# Patient Record
Sex: MALE | Race: BLACK OR AFRICAN AMERICAN | NOT HISPANIC OR LATINO | Employment: OTHER | URBAN - METROPOLITAN AREA
[De-identification: names, ages, dates, MRNs, and addresses within clinical notes are randomized per-mention and may not be internally consistent; named-entity substitution may affect disease eponyms.]

---

## 2020-01-15 ENCOUNTER — OFFICE VISIT (OUTPATIENT)
Dept: PRIMARY CARE CLINIC | Facility: CLINIC | Age: 75
End: 2020-01-15
Payer: MEDICARE

## 2020-01-15 VITALS
DIASTOLIC BLOOD PRESSURE: 70 MMHG | RESPIRATION RATE: 16 BRPM | WEIGHT: 249 LBS | OXYGEN SATURATION: 98 % | HEART RATE: 76 BPM | BODY MASS INDEX: 36.88 KG/M2 | HEIGHT: 69 IN | SYSTOLIC BLOOD PRESSURE: 130 MMHG | TEMPERATURE: 98 F

## 2020-01-15 DIAGNOSIS — Z86.79 HISTORY OF ATRIAL FIBRILLATION: ICD-10-CM

## 2020-01-15 DIAGNOSIS — Z11.59 NEED FOR HEPATITIS C SCREENING TEST: Primary | ICD-10-CM

## 2020-01-15 DIAGNOSIS — E66.9 OBESITY, UNSPECIFIED CLASSIFICATION, UNSPECIFIED OBESITY TYPE, UNSPECIFIED WHETHER SERIOUS COMORBIDITY PRESENT: ICD-10-CM

## 2020-01-15 DIAGNOSIS — I10 HYPERTENSION, UNSPECIFIED TYPE: ICD-10-CM

## 2020-01-15 DIAGNOSIS — G47.33 OBSTRUCTIVE SLEEP APNEA: ICD-10-CM

## 2020-01-15 DIAGNOSIS — E78.5 HYPERLIPIDEMIA, UNSPECIFIED HYPERLIPIDEMIA TYPE: ICD-10-CM

## 2020-01-15 DIAGNOSIS — Z95.810 STATUS POST IMPLANTATION OF AUTOMATIC CARDIOVERTER/DEFIBRILLATOR (AICD): ICD-10-CM

## 2020-01-15 DIAGNOSIS — N28.9 RENAL DISEASE: ICD-10-CM

## 2020-01-15 DIAGNOSIS — I25.10 CORONARY ARTERY DISEASE, ANGINA PRESENCE UNSPECIFIED, UNSPECIFIED VESSEL OR LESION TYPE, UNSPECIFIED WHETHER NATIVE OR TRANSPLANTED HEART: ICD-10-CM

## 2020-01-15 PROCEDURE — 99203 PR OFFICE/OUTPT VISIT, NEW, LEVL III, 30-44 MIN: ICD-10-PCS | Mod: S$GLB,,, | Performed by: FAMILY MEDICINE

## 2020-01-15 PROCEDURE — 1159F MED LIST DOCD IN RCRD: CPT | Mod: S$GLB,,, | Performed by: FAMILY MEDICINE

## 2020-01-15 PROCEDURE — 99203 OFFICE O/P NEW LOW 30 MIN: CPT | Mod: PBBFAC,PN | Performed by: FAMILY MEDICINE

## 2020-01-15 PROCEDURE — 99203 OFFICE O/P NEW LOW 30 MIN: CPT | Mod: S$GLB,,, | Performed by: FAMILY MEDICINE

## 2020-01-15 PROCEDURE — 99999 PR PBB SHADOW E&M-NEW PATIENT-LVL III: ICD-10-PCS | Mod: PBBFAC,,, | Performed by: FAMILY MEDICINE

## 2020-01-15 PROCEDURE — 1126F PR PAIN SEVERITY QUANTIFIED, NO PAIN PRESENT: ICD-10-PCS | Mod: S$GLB,,, | Performed by: FAMILY MEDICINE

## 2020-01-15 PROCEDURE — 99999 PR PBB SHADOW E&M-NEW PATIENT-LVL III: CPT | Mod: PBBFAC,,, | Performed by: FAMILY MEDICINE

## 2020-01-15 PROCEDURE — 1159F PR MEDICATION LIST DOCUMENTED IN MEDICAL RECORD: ICD-10-PCS | Mod: S$GLB,,, | Performed by: FAMILY MEDICINE

## 2020-01-15 PROCEDURE — 1126F AMNT PAIN NOTED NONE PRSNT: CPT | Mod: S$GLB,,, | Performed by: FAMILY MEDICINE

## 2020-01-15 RX ORDER — GABAPENTIN 300 MG/1
300 CAPSULE ORAL 2 TIMES DAILY
Status: ON HOLD | COMMUNITY
End: 2021-06-10 | Stop reason: HOSPADM

## 2020-01-15 RX ORDER — METOPROLOL SUCCINATE 200 MG/1
200 TABLET, EXTENDED RELEASE ORAL DAILY
Status: ON HOLD | COMMUNITY
End: 2021-04-01 | Stop reason: HOSPADM

## 2020-01-15 RX ORDER — TRAZODONE HYDROCHLORIDE 100 MG/1
100 TABLET ORAL NIGHTLY PRN
Status: ON HOLD | COMMUNITY
End: 2021-04-01 | Stop reason: HOSPADM

## 2020-01-15 RX ORDER — FLUOXETINE HYDROCHLORIDE 20 MG/1
20 CAPSULE ORAL DAILY
Status: ON HOLD | COMMUNITY
End: 2021-04-01 | Stop reason: HOSPADM

## 2020-01-15 RX ORDER — ALLOPURINOL 100 MG/1
200 TABLET ORAL DAILY
Status: ON HOLD | COMMUNITY
End: 2021-06-08 | Stop reason: SDUPTHER

## 2020-01-15 RX ORDER — FUROSEMIDE 40 MG/1
40 TABLET ORAL 2 TIMES DAILY
Status: ON HOLD | COMMUNITY
End: 2021-04-01 | Stop reason: HOSPADM

## 2020-01-15 RX ORDER — RANOLAZINE 500 MG/1
500 TABLET, EXTENDED RELEASE ORAL DAILY
Status: ON HOLD | COMMUNITY
End: 2021-04-01 | Stop reason: HOSPADM

## 2020-01-15 RX ORDER — ISOSORBIDE MONONITRATE 30 MG/1
90 TABLET, EXTENDED RELEASE ORAL 2 TIMES DAILY
Status: ON HOLD | COMMUNITY
End: 2021-04-01 | Stop reason: HOSPADM

## 2020-01-15 NOTE — PROGRESS NOTES
Subjective:       Patient ID: Felipe Orozco is a 74 y.o. male.    Chief Complaint: Establish Care    HPI: 75 yo BM --pt joined [employer] --so able be in exercise classes. Pt [facility].  Patient in for evaluation as a PCP for his insurance but [facility] because [facility] gets his medications for free.        Eating OK should be on 1800 calorie ADA low-sodium low-fat diet       + BM --occas has to strian       Ambulating gets SOB.    ROS:  Skin: no psoriasis, eczema, skin cancer   HEENT: +headache,no ocular pain, blurred vision, diplopia, history bilateral cataract surgery,no  epistaxis, hoarseness +change in voice, thyroid trouble--Hx T&A --due to tonsils being enlarged and certain times years they would get infected--nose stops up a lot --uses nasal spray  Lung: No pneumonia, asthma, Tb, wheezing, +SOB--no smoking history of albuterol inhaler in the past but no new +obstructive sleep apnea patient is CPAP machine at home  Heart: No chest pain, +ankle edema,  +palpitations--has defibrillator, no  MI, neal murmur,+hypertension, +hyperlipidemia--no stent, no bypass-[facility] of [facility]  Abdomen: No nausea, vomiting, diarrhea, constipation, ulcers, hepatitis, gallbladder disease, melena, hematochezia, hematemesis  : no UTI, renal disease, stones prostate history kidney problems wants in the past told was not functioning right  MS: no fractures, O/A, lupus, rheumatoid, +gout   Neuro: + occas  dizziness, no LOC, seizures   + diabetes was 99 this AM , no anemia, no anxiety, no depression   Single, 5 children, retired, lives with fiance    Objective:   Physical Exam:  General: Well nourished, well developed, no acute distress  Skin: No lesions  HEENT: Eyes PERRLA, EOM intact, nose patent, throat non-erythematous   NECK: Supple, no bruits, No JVD, no nodes  Lungs: Clear, no rales, rhonchi, wheezing  Heart: Regular rate  and rhythm, no murmurs, gallops, or rubs  Abdomen: flat, bowel sounds positive, no tenderness, or organomegaly  MS: Range of motion and muscle strength intact  Neuro: Alert, CN intact, oriented X 3  Extremities: No cyanosis, clubbing, or edema         Assessment:       1. Need for hepatitis C screening test    2. Hypertension, unspecified type    3. Hyperlipidemia, unspecified hyperlipidemia type    4. History of atrial fibrillation    5. Status post implantation of automatic cardioverter/defibrillator (AICD)    6. Coronary artery disease, angina presence unspecified, unspecified vessel or lesion type, unspecified whether native or transplanted heart    7. IDDM (insulin dependent diabetes mellitus)    8. Obesity, unspecified classification, unspecified obesity type, unspecified whether serious comorbidity present    9. Renal disease    10. Obstructive sleep apnea        Plan:       Need for hepatitis C screening test  -     Hepatitis C antibody; Future; Expected date: 01/15/2020    Hypertension, unspecified type  -     CBC auto differential; Future; Expected date: 01/15/2020  -     Comprehensive metabolic panel; Future; Expected date: 01/15/2020  -     EKG 12-lead; Future  -     Fecal Immunochemical Test (iFOBT); Future; Expected date: 01/15/2020  -     X-Ray Chest PA And Lateral; Future; Expected date: 01/15/2020  -     POCT urine dipstick without microscope  -     T4, free; Future; Expected date: 01/15/2020  -     TSH; Future; Expected date: 01/15/2020    Hyperlipidemia, unspecified hyperlipidemia type  -     Lipid panel; Future; Expected date: 01/15/2020    History of atrial fibrillation    Status post implantation of automatic cardioverter/defibrillator (AICD)    Coronary artery disease, angina presence unspecified, unspecified vessel or lesion type, unspecified whether native or transplanted heart    IDDM (insulin dependent diabetes mellitus)    Obesity, unspecified classification, unspecified obesity type,  unspecified whether serious comorbidity present    Renal disease    Obstructive sleep apnea      patient in for PCP for [employer] patient wanted to joint and exercise gym--will give note that it is okay for patient to exercise  Needs routine physical CBCs CMP lipids T4 TSH stool guaiac UA chest x-ray EKG is physical can do [facility] or [facility]  Health maintenance hepatitis C lipids tetanus pneumococcal vaccine flu shot screen for abdominal aortic aneurysm  Patient not sure of medications studies on--will get patient to go home get listed bring it in to see if refills need to be done--may benefit from an albuterol inhaler  MiraLax OTC 1 cap in 6 oz water q.d for  Constipation--can improve constipation with--increase fluids as long as a ankle edema is controlled, increased fruit especially pit loose prunes, increase fiber,, avoid rice and bananas

## 2020-12-09 ENCOUNTER — TELEPHONE (OUTPATIENT)
Dept: ADMINISTRATIVE | Facility: HOSPITAL | Age: 75
End: 2020-12-09

## 2020-12-09 NOTE — TELEPHONE ENCOUNTER
I called the patient to get him scheduled for his labs and overdue follow up. The patient states that he will call back to schedule as he is in [place] to have open heart surgery.

## 2021-02-02 ENCOUNTER — OFFICE VISIT (OUTPATIENT)
Dept: CARDIOLOGY | Facility: CLINIC | Age: 76
End: 2021-02-02
Payer: OTHER GOVERNMENT

## 2021-02-02 ENCOUNTER — DOCUMENTATION ONLY (OUTPATIENT)
Dept: CARDIOLOGY | Facility: CLINIC | Age: 76
End: 2021-02-02

## 2021-02-02 VITALS
SYSTOLIC BLOOD PRESSURE: 124 MMHG | OXYGEN SATURATION: 98 % | WEIGHT: 216.06 LBS | BODY MASS INDEX: 30.93 KG/M2 | DIASTOLIC BLOOD PRESSURE: 80 MMHG | HEIGHT: 70 IN | HEART RATE: 85 BPM

## 2021-02-02 DIAGNOSIS — E11.22 CONTROLLED TYPE 2 DIABETES MELLITUS WITH STAGE 3 CHRONIC KIDNEY DISEASE, WITHOUT LONG-TERM CURRENT USE OF INSULIN: ICD-10-CM

## 2021-02-02 DIAGNOSIS — N18.30 CONTROLLED TYPE 2 DIABETES MELLITUS WITH STAGE 3 CHRONIC KIDNEY DISEASE, WITHOUT LONG-TERM CURRENT USE OF INSULIN: ICD-10-CM

## 2021-02-02 DIAGNOSIS — I10 HYPERTENSION, UNSPECIFIED TYPE: ICD-10-CM

## 2021-02-02 DIAGNOSIS — E78.5 HYPERLIPIDEMIA, UNSPECIFIED HYPERLIPIDEMIA TYPE: ICD-10-CM

## 2021-02-02 DIAGNOSIS — Z86.79 HISTORY OF ATRIAL FIBRILLATION: ICD-10-CM

## 2021-02-02 DIAGNOSIS — I25.119 CORONARY ARTERY DISEASE INVOLVING NATIVE CORONARY ARTERY OF NATIVE HEART WITH ANGINA PECTORIS: Primary | ICD-10-CM

## 2021-02-02 DIAGNOSIS — Z01.812 PRE-PROCEDURE LAB EXAM: ICD-10-CM

## 2021-02-02 DIAGNOSIS — N18.30 STAGE 3 CHRONIC KIDNEY DISEASE, UNSPECIFIED WHETHER STAGE 3A OR 3B CKD: ICD-10-CM

## 2021-02-02 DIAGNOSIS — I25.5 ISCHEMIC CARDIOMYOPATHY: ICD-10-CM

## 2021-02-02 PROCEDURE — 99999 PR PBB SHADOW E&M-EST. PATIENT-LVL III: CPT | Mod: PBBFAC,,, | Performed by: INTERNAL MEDICINE

## 2021-02-02 PROCEDURE — 99205 PR OFFICE/OUTPT VISIT, NEW, LEVL V, 60-74 MIN: ICD-10-PCS | Mod: S$PBB,,, | Performed by: INTERNAL MEDICINE

## 2021-02-02 PROCEDURE — 99213 OFFICE O/P EST LOW 20 MIN: CPT | Mod: PBBFAC | Performed by: INTERNAL MEDICINE

## 2021-02-02 PROCEDURE — 99999 PR PBB SHADOW E&M-EST. PATIENT-LVL III: ICD-10-PCS | Mod: PBBFAC,,, | Performed by: INTERNAL MEDICINE

## 2021-02-02 PROCEDURE — 99205 OFFICE O/P NEW HI 60 MIN: CPT | Mod: S$PBB,,, | Performed by: INTERNAL MEDICINE

## 2021-02-02 RX ORDER — DIPHENHYDRAMINE HCL 50 MG
50 CAPSULE ORAL ONCE
Status: CANCELLED | OUTPATIENT
Start: 2021-02-02 | End: 2021-02-02

## 2021-02-02 RX ORDER — SODIUM CHLORIDE 9 MG/ML
INJECTION, SOLUTION INTRAVENOUS CONTINUOUS
Status: CANCELLED | OUTPATIENT
Start: 2021-02-02 | End: 2021-02-02

## 2021-02-12 ENCOUNTER — HOSPITAL ENCOUNTER (OUTPATIENT)
Dept: CARDIOLOGY | Facility: HOSPITAL | Age: 76
Discharge: HOME | End: 2021-02-12
Attending: INTERNAL MEDICINE
Payer: OTHER GOVERNMENT

## 2021-02-12 VITALS
HEIGHT: 70 IN | WEIGHT: 216 LBS | HEART RATE: 70 BPM | BODY MASS INDEX: 30.92 KG/M2 | SYSTOLIC BLOOD PRESSURE: 130 MMHG | DIASTOLIC BLOOD PRESSURE: 80 MMHG

## 2021-02-12 DIAGNOSIS — I25.119 CORONARY ARTERY DISEASE INVOLVING NATIVE CORONARY ARTERY OF NATIVE HEART WITH ANGINA PECTORIS: ICD-10-CM

## 2021-02-12 LAB
ASCENDING AORTA: 3.75 CM
AV INDEX (PROSTH): 0.4
AV MEAN GRADIENT: 23 MMHG
AV PEAK GRADIENT: 43 MMHG
AV VALVE AREA: 1.23 CM2
AV VELOCITY RATIO: 0.39
BSA FOR ECHO PROCEDURE: 2.2 M2
CV ECHO LV RWT: 0.37 CM
DOP CALC AO PEAK VEL: 3.27 M/S
DOP CALC AO VTI: 78.45 CM
DOP CALC LVOT AREA: 3.1 CM2
DOP CALC LVOT DIAMETER: 1.99 CM
DOP CALC LVOT PEAK VEL: 1.26 M/S
DOP CALC LVOT STROKE VOLUME: 96.8 CM3
DOP CALCLVOT PEAK VEL VTI: 31.14 CM
E/E' RATIO: 11.16 M/S
ECHO LV POSTERIOR WALL: 1.01 CM (ref 0.6–1.1)
FRACTIONAL SHORTENING: 29 % (ref 28–44)
INTERVENTRICULAR SEPTUM: 0.96 CM (ref 0.6–1.1)
IVRT: 15.22 MSEC
LA MAJOR: 6.69 CM
LA MINOR: 6.61 CM
LA WIDTH: 4.43 CM
LEFT ATRIUM SIZE: 5.11 CM
LEFT ATRIUM VOLUME INDEX MOD: 46.9 ML/M2
LEFT ATRIUM VOLUME INDEX: 59.2 ML/M2
LEFT ATRIUM VOLUME MOD: 101.32 CM3
LEFT ATRIUM VOLUME: 127.95 CM3
LEFT INTERNAL DIMENSION IN SYSTOLE: 3.87 CM (ref 2.1–4)
LEFT VENTRICLE DIASTOLIC VOLUME INDEX: 65.92 ML/M2
LEFT VENTRICLE DIASTOLIC VOLUME: 142.39 ML
LEFT VENTRICLE MASS INDEX: 94 G/M2
LEFT VENTRICLE SYSTOLIC VOLUME INDEX: 29.9 ML/M2
LEFT VENTRICLE SYSTOLIC VOLUME: 64.53 ML
LEFT VENTRICULAR INTERNAL DIMENSION IN DIASTOLE: 5.42 CM (ref 3.5–6)
LEFT VENTRICULAR MASS: 203.91 G
LV LATERAL E/E' RATIO: 9.64 M/S
LV SEPTAL E/E' RATIO: 13.25 M/S
MV A" WAVE DURATION": 18.65 MSEC
MV PEAK E VEL: 1.06 M/S
PISA TR MAX VEL: 2.98 M/S
PULM VEIN S/D RATIO: 0.8
PV PEAK D VEL: 0.71 M/S
PV PEAK S VEL: 0.57 M/S
RA MAJOR: 5.5 CM
RA PRESSURE: 3 MMHG
RA WIDTH: 5.2 CM
RETIRED EF AND QEF - SEE NOTES: 45 %
RIGHT VENTRICULAR END-DIASTOLIC DIMENSION: 4.4 CM
RV TISSUE DOPPLER FREE WALL SYSTOLIC VELOCITY 1 (APICAL 4 CHAMBER VIEW): 9.96 CM/S
SINUS: 3.73 CM
STJ: 3.3 CM
TDI LATERAL: 0.11 M/S
TDI SEPTAL: 0.08 M/S
TDI: 0.1 M/S
TR MAX PG: 36 MMHG
TRICUSPID ANNULAR PLANE SYSTOLIC EXCURSION: 1.84 CM
TV REST PULMONARY ARTERY PRESSURE: 39 MMHG

## 2021-02-12 PROCEDURE — 93306 TTE W/DOPPLER COMPLETE: CPT | Mod: 26,,, | Performed by: INTERNAL MEDICINE

## 2021-02-12 PROCEDURE — 93306 ECHO (CUPID ONLY): ICD-10-PCS | Mod: 26,,, | Performed by: INTERNAL MEDICINE

## 2021-02-12 PROCEDURE — 93306 TTE W/DOPPLER COMPLETE: CPT

## 2021-02-19 ENCOUNTER — LAB VISIT (OUTPATIENT)
Dept: INTERNAL MEDICINE | Facility: CLINIC | Age: 76
End: 2021-02-19
Payer: OTHER GOVERNMENT

## 2021-02-19 DIAGNOSIS — Z01.812 PRE-PROCEDURE LAB EXAM: ICD-10-CM

## 2021-02-19 PROCEDURE — U0005 INFEC AGEN DETEC AMPLI PROBE: HCPCS

## 2021-02-19 PROCEDURE — U0003 INFECTIOUS AGENT DETECTION BY NUCLEIC ACID (DNA OR RNA); SEVERE ACUTE RESPIRATORY SYNDROME CORONAVIRUS 2 (SARS-COV-2) (CORONAVIRUS DISEASE [COVID-19]), AMPLIFIED PROBE TECHNIQUE, MAKING USE OF HIGH THROUGHPUT TECHNOLOGIES AS DESCRIBED BY CMS-2020-01-R: HCPCS

## 2021-02-20 LAB — SARS-COV-2 RNA RESP QL NAA+PROBE: NOT DETECTED

## 2021-02-22 ENCOUNTER — HOSPITAL ENCOUNTER (OUTPATIENT)
Facility: HOSPITAL | Age: 76
Discharge: HOME | End: 2021-02-22
Attending: INTERNAL MEDICINE | Admitting: INTERNAL MEDICINE
Payer: OTHER GOVERNMENT

## 2021-02-22 VITALS
BODY MASS INDEX: 32.21 KG/M2 | OXYGEN SATURATION: 95 % | TEMPERATURE: 97 F | DIASTOLIC BLOOD PRESSURE: 64 MMHG | WEIGHT: 225 LBS | HEART RATE: 87 BPM | HEIGHT: 70 IN | SYSTOLIC BLOOD PRESSURE: 116 MMHG | RESPIRATION RATE: 16 BRPM

## 2021-02-22 DIAGNOSIS — I25.119 CORONARY ARTERY DISEASE INVOLVING NATIVE CORONARY ARTERY OF NATIVE HEART WITH ANGINA PECTORIS: ICD-10-CM

## 2021-02-22 LAB
ABO + RH BLD: NORMAL
ANION GAP SERPL CALC-SCNC: 10 MMOL/L (ref 8–16)
BASOPHILS # BLD AUTO: 0.07 K/UL (ref 0–0.2)
BASOPHILS NFR BLD: 1.3 % (ref 0–1.9)
BLD GP AB SCN CELLS X3 SERPL QL: NORMAL
BUN SERPL-MCNC: 35 MG/DL (ref 8–23)
CALCIUM SERPL-MCNC: 8.5 MG/DL (ref 8.7–10.5)
CHLORIDE SERPL-SCNC: 107 MMOL/L (ref 95–110)
CO2 SERPL-SCNC: 24 MMOL/L (ref 23–29)
CREAT SERPL-MCNC: 1.8 MG/DL (ref 0.5–1.4)
DIFFERENTIAL METHOD: ABNORMAL
EOSINOPHIL # BLD AUTO: 0.3 K/UL (ref 0–0.5)
EOSINOPHIL NFR BLD: 4.8 % (ref 0–8)
ERYTHROCYTE [DISTWIDTH] IN BLOOD BY AUTOMATED COUNT: 16.6 % (ref 11.5–14.5)
EST. GFR  (AFRICAN AMERICAN): 42.2 ML/MIN/1.73 M^2
EST. GFR  (NON AFRICAN AMERICAN): 36.5 ML/MIN/1.73 M^2
GLUCOSE SERPL-MCNC: 107 MG/DL (ref 70–110)
HCT VFR BLD AUTO: 37.5 % (ref 40–54)
HGB BLD-MCNC: 11.8 G/DL (ref 14–18)
IMM GRANULOCYTES # BLD AUTO: 0.01 K/UL (ref 0–0.04)
IMM GRANULOCYTES NFR BLD AUTO: 0.2 % (ref 0–0.5)
LYMPHOCYTES # BLD AUTO: 2 K/UL (ref 1–4.8)
LYMPHOCYTES NFR BLD: 36.8 % (ref 18–48)
MCH RBC QN AUTO: 26.5 PG (ref 27–31)
MCHC RBC AUTO-ENTMCNC: 31.5 G/DL (ref 32–36)
MCV RBC AUTO: 84 FL (ref 82–98)
MONOCYTES # BLD AUTO: 0.4 K/UL (ref 0.3–1)
MONOCYTES NFR BLD: 7.9 % (ref 4–15)
NEUTROPHILS # BLD AUTO: 2.7 K/UL (ref 1.8–7.7)
NEUTROPHILS NFR BLD: 49 % (ref 38–73)
NRBC BLD-RTO: 0 /100 WBC
PLATELET # BLD AUTO: 178 K/UL (ref 150–350)
PMV BLD AUTO: 11 FL (ref 9.2–12.9)
POCT GLUCOSE: 116 MG/DL (ref 70–110)
POTASSIUM SERPL-SCNC: 4.7 MMOL/L (ref 3.5–5.1)
RBC # BLD AUTO: 4.46 M/UL (ref 4.6–6.2)
SODIUM SERPL-SCNC: 141 MMOL/L (ref 136–145)
WBC # BLD AUTO: 5.41 K/UL (ref 3.9–12.7)

## 2021-02-22 PROCEDURE — 82962 GLUCOSE BLOOD TEST: CPT | Performed by: INTERNAL MEDICINE

## 2021-02-22 PROCEDURE — 99152 MOD SED SAME PHYS/QHP 5/>YRS: CPT | Performed by: INTERNAL MEDICINE

## 2021-02-22 PROCEDURE — 86900 BLOOD TYPING SEROLOGIC ABO: CPT

## 2021-02-22 PROCEDURE — 25000003 PHARM REV CODE 250: Performed by: INTERNAL MEDICINE

## 2021-02-22 PROCEDURE — 63600175 PHARM REV CODE 636 W HCPCS: Performed by: INTERNAL MEDICINE

## 2021-02-22 PROCEDURE — 93458 PR CATH PLACE/CORON ANGIO, IMG SUPER/INTERP,W LEFT HEART VENTRICULOGRAPHY: ICD-10-PCS | Mod: 26,,, | Performed by: INTERNAL MEDICINE

## 2021-02-22 PROCEDURE — 93005 ELECTROCARDIOGRAM TRACING: CPT

## 2021-02-22 PROCEDURE — C1894 INTRO/SHEATH, NON-LASER: HCPCS | Performed by: INTERNAL MEDICINE

## 2021-02-22 PROCEDURE — 99152 MOD SED SAME PHYS/QHP 5/>YRS: CPT | Mod: ,,, | Performed by: INTERNAL MEDICINE

## 2021-02-22 PROCEDURE — 99153 MOD SED SAME PHYS/QHP EA: CPT | Performed by: INTERNAL MEDICINE

## 2021-02-22 PROCEDURE — 99152 PR MOD CONSCIOUS SEDATION, SAME PHYS, 5+ YRS, FIRST 15 MIN: ICD-10-PCS | Mod: ,,, | Performed by: INTERNAL MEDICINE

## 2021-02-22 PROCEDURE — 85025 COMPLETE CBC W/AUTO DIFF WBC: CPT

## 2021-02-22 PROCEDURE — 93010 EKG 12-LEAD: ICD-10-PCS | Mod: ,,, | Performed by: INTERNAL MEDICINE

## 2021-02-22 PROCEDURE — 93010 ELECTROCARDIOGRAM REPORT: CPT | Mod: ,,, | Performed by: INTERNAL MEDICINE

## 2021-02-22 PROCEDURE — 80048 BASIC METABOLIC PNL TOTAL CA: CPT

## 2021-02-22 PROCEDURE — 36415 COLL VENOUS BLD VENIPUNCTURE: CPT

## 2021-02-22 PROCEDURE — 93458 L HRT ARTERY/VENTRICLE ANGIO: CPT | Performed by: INTERNAL MEDICINE

## 2021-02-22 PROCEDURE — 25500020 PHARM REV CODE 255: Performed by: INTERNAL MEDICINE

## 2021-02-22 PROCEDURE — 93458 L HRT ARTERY/VENTRICLE ANGIO: CPT | Mod: 26,,, | Performed by: INTERNAL MEDICINE

## 2021-02-22 RX ORDER — DIPHENHYDRAMINE HCL 50 MG
50 CAPSULE ORAL ONCE
Status: COMPLETED | OUTPATIENT
Start: 2021-02-22 | End: 2021-02-22

## 2021-02-22 RX ORDER — NITROGLYCERIN 5 MG/ML
INJECTION, SOLUTION INTRAVENOUS
Status: DISCONTINUED | OUTPATIENT
Start: 2021-02-22 | End: 2021-02-22 | Stop reason: HOSPADM

## 2021-02-22 RX ORDER — SODIUM CHLORIDE 9 MG/ML
INJECTION, SOLUTION INTRAVENOUS CONTINUOUS
Status: ACTIVE | OUTPATIENT
Start: 2021-02-22 | End: 2021-02-22

## 2021-02-22 RX ORDER — IODIXANOL 320 MG/ML
INJECTION, SOLUTION INTRAVASCULAR
Status: DISCONTINUED | OUTPATIENT
Start: 2021-02-22 | End: 2021-02-22 | Stop reason: HOSPADM

## 2021-02-22 RX ORDER — FENTANYL CITRATE 50 UG/ML
INJECTION, SOLUTION INTRAMUSCULAR; INTRAVENOUS
Status: DISCONTINUED | OUTPATIENT
Start: 2021-02-22 | End: 2021-02-22 | Stop reason: HOSPADM

## 2021-02-22 RX ORDER — CLOPIDOGREL BISULFATE 75 MG/1
75 TABLET ORAL DAILY
Qty: 30 TABLET | Refills: 11 | Status: SHIPPED | OUTPATIENT
Start: 2021-02-22 | End: 2021-02-23

## 2021-02-22 RX ORDER — ACETAMINOPHEN 325 MG/1
650 TABLET ORAL EVERY 4 HOURS PRN
Status: DISCONTINUED | OUTPATIENT
Start: 2021-02-22 | End: 2021-02-22 | Stop reason: HOSPADM

## 2021-02-22 RX ORDER — SODIUM CHLORIDE 9 MG/ML
INJECTION, SOLUTION INTRAVENOUS
Status: DISCONTINUED | OUTPATIENT
Start: 2021-02-22 | End: 2021-02-22 | Stop reason: HOSPADM

## 2021-02-22 RX ORDER — LIDOCAINE HYDROCHLORIDE 20 MG/ML
INJECTION, SOLUTION INFILTRATION; PERINEURAL
Status: DISCONTINUED | OUTPATIENT
Start: 2021-02-22 | End: 2021-02-22 | Stop reason: HOSPADM

## 2021-02-22 RX ORDER — CLOPIDOGREL 300 MG/1
600 TABLET, FILM COATED ORAL ONCE
Status: COMPLETED | OUTPATIENT
Start: 2021-02-22 | End: 2021-02-22

## 2021-02-22 RX ORDER — CLOPIDOGREL BISULFATE 75 MG/1
75 TABLET ORAL DAILY
Qty: 30 TABLET | Refills: 11 | Status: SHIPPED | OUTPATIENT
Start: 2021-02-22 | End: 2021-02-22 | Stop reason: SDUPTHER

## 2021-02-22 RX ORDER — HEPARIN SOD,PORCINE/0.9 % NACL 1000/500ML
INTRAVENOUS SOLUTION INTRAVENOUS
Status: DISCONTINUED | OUTPATIENT
Start: 2021-02-22 | End: 2021-02-22 | Stop reason: HOSPADM

## 2021-02-22 RX ORDER — MIDAZOLAM HYDROCHLORIDE 1 MG/ML
INJECTION, SOLUTION INTRAMUSCULAR; INTRAVENOUS
Status: DISCONTINUED | OUTPATIENT
Start: 2021-02-22 | End: 2021-02-22 | Stop reason: HOSPADM

## 2021-02-22 RX ADMIN — SODIUM CHLORIDE: 0.9 INJECTION, SOLUTION INTRAVENOUS at 07:02

## 2021-02-22 RX ADMIN — CLOPIDOGREL BISULFATE 600 MG: 300 TABLET, FILM COATED ORAL at 08:02

## 2021-02-22 RX ADMIN — DIPHENHYDRAMINE HYDROCHLORIDE 50 MG: 50 CAPSULE ORAL at 07:02

## 2021-02-23 RX ORDER — CLOPIDOGREL BISULFATE 75 MG/1
75 TABLET ORAL DAILY
Qty: 30 TABLET | Refills: 11 | Status: ON HOLD | OUTPATIENT
Start: 2021-02-23 | End: 2021-03-18 | Stop reason: HOSPADM

## 2021-02-24 DIAGNOSIS — I25.10 CORONARY ARTERY DISEASE, ANGINA PRESENCE UNSPECIFIED, UNSPECIFIED VESSEL OR LESION TYPE, UNSPECIFIED WHETHER NATIVE OR TRANSPLANTED HEART: Primary | ICD-10-CM

## 2021-02-24 DIAGNOSIS — I25.119 CORONARY ARTERY DISEASE INVOLVING NATIVE CORONARY ARTERY OF NATIVE HEART WITH ANGINA PECTORIS: Primary | ICD-10-CM

## 2021-02-24 DIAGNOSIS — Z01.812 PRE-PROCEDURE LAB EXAM: Primary | ICD-10-CM

## 2021-02-24 RX ORDER — DIPHENHYDRAMINE HCL 50 MG
50 CAPSULE ORAL ONCE
Status: CANCELLED | OUTPATIENT
Start: 2021-02-24 | End: 2021-02-24

## 2021-02-24 RX ORDER — SODIUM CHLORIDE 9 MG/ML
INJECTION, SOLUTION INTRAVENOUS CONTINUOUS
Status: CANCELLED | OUTPATIENT
Start: 2021-02-24 | End: 2021-02-24

## 2021-02-25 ENCOUNTER — DOCUMENTATION ONLY (OUTPATIENT)
Dept: CARDIOLOGY | Facility: CLINIC | Age: 76
End: 2021-02-25

## 2021-03-15 ENCOUNTER — LAB VISIT (OUTPATIENT)
Dept: INTERNAL MEDICINE | Facility: CLINIC | Age: 76
End: 2021-03-15
Payer: OTHER GOVERNMENT

## 2021-03-15 ENCOUNTER — LAB VISIT (OUTPATIENT)
Dept: LAB | Facility: HOSPITAL | Age: 76
End: 2021-03-15
Attending: INTERNAL MEDICINE
Payer: OTHER GOVERNMENT

## 2021-03-15 DIAGNOSIS — Z01.812 PRE-PROCEDURE LAB EXAM: ICD-10-CM

## 2021-03-15 DIAGNOSIS — I25.119 CORONARY ARTERY DISEASE INVOLVING NATIVE CORONARY ARTERY OF NATIVE HEART WITH ANGINA PECTORIS: ICD-10-CM

## 2021-03-15 LAB
ANION GAP SERPL CALC-SCNC: 10 MMOL/L (ref 8–16)
BASOPHILS # BLD AUTO: 0.06 K/UL (ref 0–0.2)
BASOPHILS NFR BLD: 1.1 % (ref 0–1.9)
BUN SERPL-MCNC: 26 MG/DL (ref 8–23)
CALCIUM SERPL-MCNC: 8.5 MG/DL (ref 8.7–10.5)
CHLORIDE SERPL-SCNC: 107 MMOL/L (ref 95–110)
CO2 SERPL-SCNC: 24 MMOL/L (ref 23–29)
CREAT SERPL-MCNC: 1.6 MG/DL (ref 0.5–1.4)
DIFFERENTIAL METHOD: ABNORMAL
EOSINOPHIL # BLD AUTO: 0.3 K/UL (ref 0–0.5)
EOSINOPHIL NFR BLD: 4.6 % (ref 0–8)
ERYTHROCYTE [DISTWIDTH] IN BLOOD BY AUTOMATED COUNT: 17.8 % (ref 11.5–14.5)
EST. GFR  (AFRICAN AMERICAN): 48.7 ML/MIN/1.73 M^2
EST. GFR  (NON AFRICAN AMERICAN): 42.1 ML/MIN/1.73 M^2
GLUCOSE SERPL-MCNC: 89 MG/DL (ref 70–110)
HCT VFR BLD AUTO: 38.4 % (ref 40–54)
HGB BLD-MCNC: 11.9 G/DL (ref 14–18)
IMM GRANULOCYTES # BLD AUTO: 0.01 K/UL (ref 0–0.04)
IMM GRANULOCYTES NFR BLD AUTO: 0.2 % (ref 0–0.5)
LYMPHOCYTES # BLD AUTO: 2 K/UL (ref 1–4.8)
LYMPHOCYTES NFR BLD: 34.3 % (ref 18–48)
MCH RBC QN AUTO: 26.6 PG (ref 27–31)
MCHC RBC AUTO-ENTMCNC: 31 G/DL (ref 32–36)
MCV RBC AUTO: 86 FL (ref 82–98)
MONOCYTES # BLD AUTO: 0.4 K/UL (ref 0.3–1)
MONOCYTES NFR BLD: 7.4 % (ref 4–15)
NEUTROPHILS # BLD AUTO: 3 K/UL (ref 1.8–7.7)
NEUTROPHILS NFR BLD: 52.4 % (ref 38–73)
NRBC BLD-RTO: 0 /100 WBC
PLATELET # BLD AUTO: 196 K/UL (ref 150–350)
PMV BLD AUTO: 10.3 FL (ref 9.2–12.9)
POTASSIUM SERPL-SCNC: 4.5 MMOL/L (ref 3.5–5.1)
RBC # BLD AUTO: 4.47 M/UL (ref 4.6–6.2)
SODIUM SERPL-SCNC: 141 MMOL/L (ref 136–145)
WBC # BLD AUTO: 5.68 K/UL (ref 3.9–12.7)

## 2021-03-15 PROCEDURE — 36415 COLL VENOUS BLD VENIPUNCTURE: CPT | Performed by: INTERNAL MEDICINE

## 2021-03-15 PROCEDURE — 80048 BASIC METABOLIC PNL TOTAL CA: CPT | Performed by: INTERNAL MEDICINE

## 2021-03-15 PROCEDURE — U0005 INFEC AGEN DETEC AMPLI PROBE: HCPCS | Performed by: INTERNAL MEDICINE

## 2021-03-15 PROCEDURE — 85025 COMPLETE CBC W/AUTO DIFF WBC: CPT | Performed by: INTERNAL MEDICINE

## 2021-03-15 PROCEDURE — U0003 INFECTIOUS AGENT DETECTION BY NUCLEIC ACID (DNA OR RNA); SEVERE ACUTE RESPIRATORY SYNDROME CORONAVIRUS 2 (SARS-COV-2) (CORONAVIRUS DISEASE [COVID-19]), AMPLIFIED PROBE TECHNIQUE, MAKING USE OF HIGH THROUGHPUT TECHNOLOGIES AS DESCRIBED BY CMS-2020-01-R: HCPCS | Performed by: INTERNAL MEDICINE

## 2021-03-16 ENCOUNTER — TELEPHONE (OUTPATIENT)
Dept: CARDIOLOGY | Facility: CLINIC | Age: 76
End: 2021-03-16

## 2021-03-16 LAB — SARS-COV-2 RNA RESP QL NAA+PROBE: NOT DETECTED

## 2021-03-18 ENCOUNTER — HOSPITAL ENCOUNTER (OUTPATIENT)
Facility: HOSPITAL | Age: 76
Discharge: HOME | End: 2021-03-18
Attending: INTERNAL MEDICINE | Admitting: INTERNAL MEDICINE
Payer: OTHER GOVERNMENT

## 2021-03-18 ENCOUNTER — DOCUMENTATION ONLY (OUTPATIENT)
Dept: CARDIOTHORACIC SURGERY | Facility: CLINIC | Age: 76
End: 2021-03-18

## 2021-03-18 VITALS
HEIGHT: 70 IN | BODY MASS INDEX: 30.78 KG/M2 | SYSTOLIC BLOOD PRESSURE: 120 MMHG | DIASTOLIC BLOOD PRESSURE: 69 MMHG | OXYGEN SATURATION: 95 % | RESPIRATION RATE: 18 BRPM | TEMPERATURE: 98 F | WEIGHT: 215 LBS | HEART RATE: 68 BPM

## 2021-03-18 DIAGNOSIS — I25.10 CORONARY ARTERY DISEASE INVOLVING NATIVE CORONARY ARTERY OF NATIVE HEART WITHOUT ANGINA PECTORIS: ICD-10-CM

## 2021-03-18 DIAGNOSIS — Z01.818 PREOP TESTING: Primary | ICD-10-CM

## 2021-03-18 DIAGNOSIS — I25.10 CORONARY ARTERY DISEASE, ANGINA PRESENCE UNSPECIFIED, UNSPECIFIED VESSEL OR LESION TYPE, UNSPECIFIED WHETHER NATIVE OR TRANSPLANTED HEART: ICD-10-CM

## 2021-03-18 DIAGNOSIS — E11.22 CONTROLLED TYPE 2 DIABETES MELLITUS WITH CHRONIC KIDNEY DISEASE, UNSPECIFIED CKD STAGE, UNSPECIFIED WHETHER LONG TERM INSULIN USE: ICD-10-CM

## 2021-03-18 DIAGNOSIS — I35.8 OTHER NONRHEUMATIC AORTIC VALVE DISORDERS: ICD-10-CM

## 2021-03-18 LAB
ABO + RH BLD: NORMAL
BLD GP AB SCN CELLS X3 SERPL QL: NORMAL
CREAT SERPL-MCNC: 1.6 MG/DL (ref 0.5–1.4)
SAMPLE: ABNORMAL

## 2021-03-18 PROCEDURE — 92979 ENDOLUMINL IVUS OCT C EA: CPT | Performed by: INTERNAL MEDICINE

## 2021-03-18 PROCEDURE — 93571 IV DOP VEL&/PRESS C FLO 1ST: CPT | Mod: 74 | Performed by: INTERNAL MEDICINE

## 2021-03-18 PROCEDURE — 82962 GLUCOSE BLOOD TEST: CPT | Performed by: INTERNAL MEDICINE

## 2021-03-18 PROCEDURE — 99152 MOD SED SAME PHYS/QHP 5/>YRS: CPT | Mod: ,,, | Performed by: INTERNAL MEDICINE

## 2021-03-18 PROCEDURE — C1887 CATHETER, GUIDING: HCPCS | Performed by: INTERNAL MEDICINE

## 2021-03-18 PROCEDURE — 99153 MOD SED SAME PHYS/QHP EA: CPT | Performed by: INTERNAL MEDICINE

## 2021-03-18 PROCEDURE — 25000003 PHARM REV CODE 250: Performed by: INTERNAL MEDICINE

## 2021-03-18 PROCEDURE — 86900 BLOOD TYPING SEROLOGIC ABO: CPT | Performed by: INTERNAL MEDICINE

## 2021-03-18 PROCEDURE — C1894 INTRO/SHEATH, NON-LASER: HCPCS | Performed by: INTERNAL MEDICINE

## 2021-03-18 PROCEDURE — 93571 IV DOP VEL&/PRESS C FLO 1ST: CPT | Mod: 26,52,, | Performed by: INTERNAL MEDICINE

## 2021-03-18 PROCEDURE — C1753 CATH, INTRAVAS ULTRASOUND: HCPCS | Performed by: INTERNAL MEDICINE

## 2021-03-18 PROCEDURE — 93005 ELECTROCARDIOGRAM TRACING: CPT

## 2021-03-18 PROCEDURE — 25000003 PHARM REV CODE 250: Performed by: STUDENT IN AN ORGANIZED HEALTH CARE EDUCATION/TRAINING PROGRAM

## 2021-03-18 PROCEDURE — 92979 PR INTRAVASC CORONARY SO2,ADDN VESSEL: ICD-10-PCS | Mod: 26,LC,, | Performed by: INTERNAL MEDICINE

## 2021-03-18 PROCEDURE — 93010 EKG 12-LEAD: ICD-10-PCS | Mod: ,,, | Performed by: INTERNAL MEDICINE

## 2021-03-18 PROCEDURE — 63600175 PHARM REV CODE 636 W HCPCS: Performed by: INTERNAL MEDICINE

## 2021-03-18 PROCEDURE — 93454 CORONARY ARTERY ANGIO S&I: CPT | Performed by: INTERNAL MEDICINE

## 2021-03-18 PROCEDURE — 93454 PR CATH PLACE/CORONARY ANGIO, IMG SUPER/INTERP: ICD-10-PCS | Mod: 26,,, | Performed by: INTERNAL MEDICINE

## 2021-03-18 PROCEDURE — C1760 CLOSURE DEV, VASC: HCPCS | Performed by: INTERNAL MEDICINE

## 2021-03-18 PROCEDURE — 99205 PR OFFICE/OUTPT VISIT, NEW, LEVL V, 60-74 MIN: ICD-10-PCS | Mod: ,,, | Performed by: NURSE PRACTITIONER

## 2021-03-18 PROCEDURE — 99152 MOD SED SAME PHYS/QHP 5/>YRS: CPT | Performed by: INTERNAL MEDICINE

## 2021-03-18 PROCEDURE — 99205 OFFICE O/P NEW HI 60 MIN: CPT | Mod: ,,, | Performed by: NURSE PRACTITIONER

## 2021-03-18 PROCEDURE — 25500020 PHARM REV CODE 255: Performed by: INTERNAL MEDICINE

## 2021-03-18 PROCEDURE — 92978 ENDOLUMINL IVUS OCT C 1ST: CPT | Performed by: INTERNAL MEDICINE

## 2021-03-18 PROCEDURE — 93010 ELECTROCARDIOGRAM REPORT: CPT | Mod: ,,, | Performed by: INTERNAL MEDICINE

## 2021-03-18 PROCEDURE — C1769 GUIDE WIRE: HCPCS | Performed by: INTERNAL MEDICINE

## 2021-03-18 PROCEDURE — 92978 PR IVUS, CORONARY, 1ST VESSEL: ICD-10-PCS | Mod: 26,LM,, | Performed by: INTERNAL MEDICINE

## 2021-03-18 PROCEDURE — 93571 PR HEART FLOW RESERV MEASURE,INIT VESSL: ICD-10-PCS | Mod: 26,52,, | Performed by: INTERNAL MEDICINE

## 2021-03-18 PROCEDURE — 92979 ENDOLUMINL IVUS OCT C EA: CPT | Mod: 26,LC,, | Performed by: INTERNAL MEDICINE

## 2021-03-18 PROCEDURE — 99152 PR MOD CONSCIOUS SEDATION, SAME PHYS, 5+ YRS, FIRST 15 MIN: ICD-10-PCS | Mod: ,,, | Performed by: INTERNAL MEDICINE

## 2021-03-18 PROCEDURE — 93454 CORONARY ARTERY ANGIO S&I: CPT | Mod: 26,,, | Performed by: INTERNAL MEDICINE

## 2021-03-18 PROCEDURE — 92978 ENDOLUMINL IVUS OCT C 1ST: CPT | Mod: 26,LM,, | Performed by: INTERNAL MEDICINE

## 2021-03-18 RX ORDER — METFORMIN HYDROCHLORIDE 500 MG/1
500 TABLET ORAL 2 TIMES DAILY WITH MEALS
Status: ON HOLD | COMMUNITY
End: 2021-06-08 | Stop reason: HOSPADM

## 2021-03-18 RX ORDER — SODIUM CHLORIDE 9 MG/ML
INJECTION, SOLUTION INTRAVENOUS CONTINUOUS
Status: ACTIVE | OUTPATIENT
Start: 2021-03-18 | End: 2021-03-18

## 2021-03-18 RX ORDER — LIDOCAINE HYDROCHLORIDE 20 MG/ML
INJECTION, SOLUTION INFILTRATION; PERINEURAL
Status: DISCONTINUED | OUTPATIENT
Start: 2021-03-18 | End: 2021-03-18 | Stop reason: HOSPADM

## 2021-03-18 RX ORDER — ACETAMINOPHEN 325 MG/1
650 TABLET ORAL EVERY 4 HOURS PRN
Status: DISCONTINUED | OUTPATIENT
Start: 2021-03-18 | End: 2021-03-18 | Stop reason: HOSPADM

## 2021-03-18 RX ORDER — HEPARIN SOD,PORCINE/0.9 % NACL 1000/500ML
INTRAVENOUS SOLUTION INTRAVENOUS
Status: DISCONTINUED | OUTPATIENT
Start: 2021-03-18 | End: 2021-03-18 | Stop reason: HOSPADM

## 2021-03-18 RX ORDER — NITROGLYCERIN 5 MG/ML
INJECTION, SOLUTION INTRAVENOUS
Status: DISCONTINUED | OUTPATIENT
Start: 2021-03-18 | End: 2021-03-18 | Stop reason: HOSPADM

## 2021-03-18 RX ORDER — IODIXANOL 320 MG/ML
INJECTION, SOLUTION INTRAVASCULAR
Status: DISCONTINUED | OUTPATIENT
Start: 2021-03-18 | End: 2021-03-18 | Stop reason: HOSPADM

## 2021-03-18 RX ORDER — FENTANYL CITRATE 50 UG/ML
INJECTION, SOLUTION INTRAMUSCULAR; INTRAVENOUS
Status: DISCONTINUED | OUTPATIENT
Start: 2021-03-18 | End: 2021-03-18 | Stop reason: HOSPADM

## 2021-03-18 RX ORDER — ONDANSETRON 8 MG/1
8 TABLET, ORALLY DISINTEGRATING ORAL EVERY 8 HOURS PRN
Status: DISCONTINUED | OUTPATIENT
Start: 2021-03-18 | End: 2021-03-18 | Stop reason: HOSPADM

## 2021-03-18 RX ORDER — CEFAZOLIN SODIUM 1 G/3ML
INJECTION, POWDER, FOR SOLUTION INTRAMUSCULAR; INTRAVENOUS
Status: DISCONTINUED | OUTPATIENT
Start: 2021-03-18 | End: 2021-03-18 | Stop reason: HOSPADM

## 2021-03-18 RX ORDER — HEPARIN SODIUM 1000 [USP'U]/ML
INJECTION, SOLUTION INTRAVENOUS; SUBCUTANEOUS
Status: DISCONTINUED | OUTPATIENT
Start: 2021-03-18 | End: 2021-03-18 | Stop reason: HOSPADM

## 2021-03-18 RX ORDER — ADENOSINE 3 MG/ML
INJECTION, SOLUTION INTRAVENOUS
Status: DISCONTINUED | OUTPATIENT
Start: 2021-03-18 | End: 2021-03-18 | Stop reason: HOSPADM

## 2021-03-18 RX ORDER — MIDAZOLAM HYDROCHLORIDE 1 MG/ML
INJECTION, SOLUTION INTRAMUSCULAR; INTRAVENOUS
Status: DISCONTINUED | OUTPATIENT
Start: 2021-03-18 | End: 2021-03-18 | Stop reason: HOSPADM

## 2021-03-18 RX ORDER — DIPHENHYDRAMINE HCL 50 MG
50 CAPSULE ORAL ONCE
Status: COMPLETED | OUTPATIENT
Start: 2021-03-18 | End: 2021-03-18

## 2021-03-18 RX ADMIN — SODIUM CHLORIDE: 0.9 INJECTION, SOLUTION INTRAVENOUS at 06:03

## 2021-03-18 RX ADMIN — DIPHENHYDRAMINE HYDROCHLORIDE 50 MG: 50 CAPSULE ORAL at 06:03

## 2021-03-19 ENCOUNTER — TELEPHONE (OUTPATIENT)
Dept: PREADMISSION TESTING | Facility: HOSPITAL | Age: 76
End: 2021-03-19

## 2021-03-19 LAB
POC ACTIVATED CLOTTING TIME K: 164 SEC (ref 74–137)
POCT GLUCOSE: 106 MG/DL (ref 70–110)
SAMPLE: ABNORMAL

## 2021-03-24 ENCOUNTER — OFFICE VISIT (OUTPATIENT)
Dept: CARDIOTHORACIC SURGERY | Facility: CLINIC | Age: 76
DRG: 219 | End: 2021-03-24
Payer: OTHER GOVERNMENT

## 2021-03-24 ENCOUNTER — HOSPITAL ENCOUNTER (OUTPATIENT)
Dept: CARDIOLOGY | Facility: CLINIC | Age: 76
Discharge: HOME | DRG: 219 | End: 2021-03-24
Payer: OTHER GOVERNMENT

## 2021-03-24 ENCOUNTER — HOSPITAL ENCOUNTER (OUTPATIENT)
Dept: PREADMISSION TESTING | Facility: HOSPITAL | Age: 76
Discharge: HOME | DRG: 219 | End: 2021-03-24
Attending: ANESTHESIOLOGY
Payer: OTHER GOVERNMENT

## 2021-03-24 ENCOUNTER — HOSPITAL ENCOUNTER (OUTPATIENT)
Dept: RADIOLOGY | Facility: HOSPITAL | Age: 76
Discharge: HOME | DRG: 219 | End: 2021-03-24
Attending: THORACIC SURGERY (CARDIOTHORACIC VASCULAR SURGERY)
Payer: OTHER GOVERNMENT

## 2021-03-24 ENCOUNTER — HOSPITAL ENCOUNTER (OUTPATIENT)
Dept: VASCULAR SURGERY | Facility: CLINIC | Age: 76
Discharge: HOME | DRG: 219 | End: 2021-03-24
Payer: OTHER GOVERNMENT

## 2021-03-24 ENCOUNTER — ANESTHESIA EVENT (OUTPATIENT)
Dept: SURGERY | Facility: HOSPITAL | Age: 76
DRG: 219 | End: 2021-03-24
Payer: OTHER GOVERNMENT

## 2021-03-24 ENCOUNTER — LAB VISIT (OUTPATIENT)
Dept: INTERNAL MEDICINE | Facility: CLINIC | Age: 76
DRG: 219 | End: 2021-03-24
Payer: OTHER GOVERNMENT

## 2021-03-24 VITALS
HEART RATE: 73 BPM | WEIGHT: 215.88 LBS | SYSTOLIC BLOOD PRESSURE: 145 MMHG | HEIGHT: 68 IN | BODY MASS INDEX: 32.72 KG/M2 | OXYGEN SATURATION: 100 % | DIASTOLIC BLOOD PRESSURE: 70 MMHG

## 2021-03-24 VITALS
HEIGHT: 70 IN | HEART RATE: 88 BPM | BODY MASS INDEX: 30.89 KG/M2 | SYSTOLIC BLOOD PRESSURE: 138 MMHG | TEMPERATURE: 98 F | DIASTOLIC BLOOD PRESSURE: 74 MMHG | OXYGEN SATURATION: 98 % | WEIGHT: 215.75 LBS

## 2021-03-24 DIAGNOSIS — I25.10 CAD (CORONARY ARTERY DISEASE): ICD-10-CM

## 2021-03-24 DIAGNOSIS — E11.22 CONTROLLED TYPE 2 DIABETES MELLITUS WITH CHRONIC KIDNEY DISEASE, UNSPECIFIED CKD STAGE, UNSPECIFIED WHETHER LONG TERM INSULIN USE: ICD-10-CM

## 2021-03-24 DIAGNOSIS — Z01.818 PREOP TESTING: ICD-10-CM

## 2021-03-24 DIAGNOSIS — I25.10 CORONARY ARTERY DISEASE INVOLVING NATIVE CORONARY ARTERY OF NATIVE HEART WITHOUT ANGINA PECTORIS: ICD-10-CM

## 2021-03-24 DIAGNOSIS — Z86.79 HISTORY OF ATRIAL FIBRILLATION: Primary | ICD-10-CM

## 2021-03-24 DIAGNOSIS — I35.8 OTHER NONRHEUMATIC AORTIC VALVE DISORDERS: ICD-10-CM

## 2021-03-24 PROCEDURE — 93880 PR DUPLEX SCAN EXTRACRANIAL,BILAT: ICD-10-PCS | Mod: 26,S$PBB,, | Performed by: SURGERY

## 2021-03-24 PROCEDURE — 99999 PR PBB SHADOW E&M-EST. PATIENT-LVL III: CPT | Mod: PBBFAC,,, | Performed by: THORACIC SURGERY (CARDIOTHORACIC VASCULAR SURGERY)

## 2021-03-24 PROCEDURE — 71046 XR CHEST PA AND LATERAL: ICD-10-PCS | Mod: 26,,, | Performed by: RADIOLOGY

## 2021-03-24 PROCEDURE — 71046 X-RAY EXAM CHEST 2 VIEWS: CPT | Mod: 26,,, | Performed by: RADIOLOGY

## 2021-03-24 PROCEDURE — 71046 X-RAY EXAM CHEST 2 VIEWS: CPT | Mod: TC,FY

## 2021-03-24 PROCEDURE — 93880 EXTRACRANIAL BILAT STUDY: CPT | Mod: PBBFAC | Performed by: SURGERY

## 2021-03-24 PROCEDURE — 99999 PR PBB SHADOW E&M-EST. PATIENT-LVL III: ICD-10-PCS | Mod: PBBFAC,,, | Performed by: THORACIC SURGERY (CARDIOTHORACIC VASCULAR SURGERY)

## 2021-03-24 PROCEDURE — 71250 CT CHEST WITHOUT CONTRAST: ICD-10-PCS | Mod: 26,,, | Performed by: RADIOLOGY

## 2021-03-24 PROCEDURE — 93880 EXTRACRANIAL BILAT STUDY: CPT | Mod: 26,S$PBB,, | Performed by: SURGERY

## 2021-03-24 PROCEDURE — 71250 CT THORAX DX C-: CPT | Mod: 26,,, | Performed by: RADIOLOGY

## 2021-03-24 PROCEDURE — 71250 CT THORAX DX C-: CPT | Mod: TC

## 2021-03-24 PROCEDURE — 93010 EKG 12-LEAD: ICD-10-PCS | Mod: S$PBB,,, | Performed by: INTERNAL MEDICINE

## 2021-03-24 PROCEDURE — 93005 ELECTROCARDIOGRAM TRACING: CPT | Mod: PBBFAC | Performed by: INTERNAL MEDICINE

## 2021-03-24 PROCEDURE — 93010 ELECTROCARDIOGRAM REPORT: CPT | Mod: S$PBB,,, | Performed by: INTERNAL MEDICINE

## 2021-03-24 PROCEDURE — 99499 NO LOS: ICD-10-PCS | Mod: S$PBB,,, | Performed by: THORACIC SURGERY (CARDIOTHORACIC VASCULAR SURGERY)

## 2021-03-24 PROCEDURE — U0003 INFECTIOUS AGENT DETECTION BY NUCLEIC ACID (DNA OR RNA); SEVERE ACUTE RESPIRATORY SYNDROME CORONAVIRUS 2 (SARS-COV-2) (CORONAVIRUS DISEASE [COVID-19]), AMPLIFIED PROBE TECHNIQUE, MAKING USE OF HIGH THROUGHPUT TECHNOLOGIES AS DESCRIBED BY CMS-2020-01-R: HCPCS | Performed by: THORACIC SURGERY (CARDIOTHORACIC VASCULAR SURGERY)

## 2021-03-24 PROCEDURE — U0005 INFEC AGEN DETEC AMPLI PROBE: HCPCS | Performed by: THORACIC SURGERY (CARDIOTHORACIC VASCULAR SURGERY)

## 2021-03-24 PROCEDURE — 99213 OFFICE O/P EST LOW 20 MIN: CPT | Mod: PBBFAC,25 | Performed by: THORACIC SURGERY (CARDIOTHORACIC VASCULAR SURGERY)

## 2021-03-24 PROCEDURE — 99499 UNLISTED E&M SERVICE: CPT | Mod: S$PBB,,, | Performed by: THORACIC SURGERY (CARDIOTHORACIC VASCULAR SURGERY)

## 2021-03-24 RX ORDER — IPRATROPIUM BROMIDE AND ALBUTEROL SULFATE 2.5; .5 MG/3ML; MG/3ML
3 SOLUTION RESPIRATORY (INHALATION) EVERY 4 HOURS PRN
Status: CANCELLED | OUTPATIENT
Start: 2021-03-24 | End: 2021-03-25

## 2021-03-24 RX ORDER — LIDOCAINE HYDROCHLORIDE 10 MG/ML
1 INJECTION, SOLUTION EPIDURAL; INFILTRATION; INTRACAUDAL; PERINEURAL
Status: CANCELLED | OUTPATIENT
Start: 2021-03-24

## 2021-03-24 RX ORDER — ONDANSETRON 2 MG/ML
4 INJECTION INTRAMUSCULAR; INTRAVENOUS EVERY 12 HOURS PRN
Status: CANCELLED | OUTPATIENT
Start: 2021-03-24

## 2021-03-24 RX ORDER — ALBUMIN HUMAN 50 G/1000ML
25 SOLUTION INTRAVENOUS ONCE AS NEEDED
Status: CANCELLED | OUTPATIENT
Start: 2021-03-24 | End: 2032-08-20

## 2021-03-24 RX ORDER — ACETAMINOPHEN 325 MG/1
650 TABLET ORAL EVERY 4 HOURS PRN
Status: CANCELLED | OUTPATIENT
Start: 2021-03-24

## 2021-03-24 RX ORDER — ASPIRIN 325 MG
325 TABLET ORAL DAILY
Status: CANCELLED | OUTPATIENT
Start: 2021-03-24

## 2021-03-24 RX ORDER — METOPROLOL TARTRATE 25 MG/1
25 TABLET, FILM COATED ORAL
Status: CANCELLED | OUTPATIENT
Start: 2021-03-24

## 2021-03-24 RX ORDER — ASPIRIN 325 MG
325 TABLET, DELAYED RELEASE (ENTERIC COATED) ORAL DAILY
Status: CANCELLED | OUTPATIENT
Start: 2021-03-24

## 2021-03-24 RX ORDER — MAGNESIUM SULFATE/D5W 2 G/50 ML
4 INTRAVENOUS SOLUTION, PIGGYBACK (ML) INTRAVENOUS
Status: CANCELLED | OUTPATIENT
Start: 2021-03-24

## 2021-03-24 RX ORDER — POTASSIUM CHLORIDE 29.8 MG/ML
40 INJECTION INTRAVENOUS
Status: CANCELLED | OUTPATIENT
Start: 2021-03-24

## 2021-03-24 RX ORDER — FENTANYL CITRATE 50 UG/ML
50 INJECTION, SOLUTION INTRAMUSCULAR; INTRAVENOUS
Status: CANCELLED | OUTPATIENT
Start: 2021-03-28

## 2021-03-24 RX ORDER — FENTANYL CITRATE 50 UG/ML
25 INJECTION, SOLUTION INTRAMUSCULAR; INTRAVENOUS
Status: CANCELLED | OUTPATIENT
Start: 2021-03-24

## 2021-03-24 RX ORDER — IPRATROPIUM BROMIDE AND ALBUTEROL SULFATE 2.5; .5 MG/3ML; MG/3ML
3 SOLUTION RESPIRATORY (INHALATION) EVERY 4 HOURS
Status: CANCELLED | OUTPATIENT
Start: 2021-03-24 | End: 2021-03-25

## 2021-03-24 RX ORDER — METOCLOPRAMIDE HYDROCHLORIDE 5 MG/ML
5 INJECTION INTRAMUSCULAR; INTRAVENOUS EVERY 6 HOURS PRN
Status: CANCELLED | OUTPATIENT
Start: 2021-03-24

## 2021-03-24 RX ORDER — CEFAZOLIN SODIUM 2 G/50ML
2 SOLUTION INTRAVENOUS
Status: CANCELLED | OUTPATIENT
Start: 2021-03-24

## 2021-03-24 RX ORDER — OXYCODONE HYDROCHLORIDE 5 MG/1
5 TABLET ORAL EVERY 4 HOURS PRN
Status: CANCELLED | OUTPATIENT
Start: 2021-03-24

## 2021-03-24 RX ORDER — OXYCODONE HYDROCHLORIDE 10 MG/1
10 TABLET ORAL EVERY 4 HOURS PRN
Status: CANCELLED | OUTPATIENT
Start: 2021-03-24

## 2021-03-24 RX ORDER — CEFAZOLIN SODIUM 2 G/50ML
2 SOLUTION INTRAVENOUS
Status: CANCELLED | OUTPATIENT
Start: 2021-03-24 | End: 2021-03-26

## 2021-03-24 RX ORDER — BISACODYL 10 MG
10 SUPPOSITORY, RECTAL RECTAL EVERY 12 HOURS PRN
Status: CANCELLED | OUTPATIENT
Start: 2021-03-24

## 2021-03-24 RX ORDER — POTASSIUM CHLORIDE 14.9 MG/ML
60 INJECTION INTRAVENOUS
Status: CANCELLED | OUTPATIENT
Start: 2021-03-24

## 2021-03-24 RX ORDER — ASPIRIN 300 MG/1
300 SUPPOSITORY RECTAL ONCE AS NEEDED
Status: CANCELLED | OUTPATIENT
Start: 2021-03-24 | End: 2032-08-20

## 2021-03-24 RX ORDER — FAMOTIDINE 10 MG/ML
20 INJECTION INTRAVENOUS 2 TIMES DAILY
Status: CANCELLED | OUTPATIENT
Start: 2021-03-24

## 2021-03-24 RX ORDER — MUPIROCIN 20 MG/G
1 OINTMENT TOPICAL
Status: CANCELLED | OUTPATIENT
Start: 2021-03-24

## 2021-03-24 RX ORDER — ASPIRIN 325 MG
325 TABLET ORAL ONCE
Status: CANCELLED | OUTPATIENT
Start: 2021-03-24 | End: 2021-03-24

## 2021-03-24 RX ORDER — DEXTROSE MONOHYDRATE, SODIUM CHLORIDE, AND POTASSIUM CHLORIDE 50; 1.49; 4.5 G/1000ML; G/1000ML; G/1000ML
INJECTION, SOLUTION INTRAVENOUS CONTINUOUS
Status: CANCELLED | OUTPATIENT
Start: 2021-03-24

## 2021-03-24 RX ORDER — FENTANYL CITRATE 50 UG/ML
25 INJECTION, SOLUTION INTRAMUSCULAR; INTRAVENOUS
Status: CANCELLED | OUTPATIENT
Start: 2021-03-24 | End: 2021-03-27

## 2021-03-24 RX ORDER — SODIUM CHLORIDE 9 MG/ML
INJECTION, SOLUTION INTRAVENOUS CONTINUOUS
Status: CANCELLED | OUTPATIENT
Start: 2021-03-24

## 2021-03-24 RX ORDER — MUPIROCIN 20 MG/G
1 OINTMENT TOPICAL 2 TIMES DAILY
Status: CANCELLED | OUTPATIENT
Start: 2021-03-24 | End: 2021-03-29

## 2021-03-24 RX ORDER — SODIUM CHLORIDE 0.9 % (FLUSH) 0.9 %
10 SYRINGE (ML) INJECTION
Status: CANCELLED | OUTPATIENT
Start: 2021-03-24

## 2021-03-24 RX ORDER — MAGNESIUM SULFATE HEPTAHYDRATE 40 MG/ML
2 INJECTION, SOLUTION INTRAVENOUS
Status: CANCELLED | OUTPATIENT
Start: 2021-03-24

## 2021-03-24 RX ORDER — POTASSIUM CHLORIDE 14.9 MG/ML
20 INJECTION INTRAVENOUS
Status: CANCELLED | OUTPATIENT
Start: 2021-03-24

## 2021-03-24 RX ORDER — DOCUSATE SODIUM 100 MG/1
100 CAPSULE, LIQUID FILLED ORAL 2 TIMES DAILY
Status: CANCELLED | OUTPATIENT
Start: 2021-03-24

## 2021-03-24 RX ORDER — POLYETHYLENE GLYCOL 3350 17 G/17G
17 POWDER, FOR SOLUTION ORAL DAILY
Status: CANCELLED | OUTPATIENT
Start: 2021-03-24

## 2021-03-25 ENCOUNTER — DOCUMENTATION ONLY (OUTPATIENT)
Dept: CARDIOTHORACIC SURGERY | Facility: CLINIC | Age: 76
End: 2021-03-25

## 2021-03-25 LAB — SARS-COV-2 RNA RESP QL NAA+PROBE: NOT DETECTED

## 2021-03-26 ENCOUNTER — DOCUMENTATION ONLY (OUTPATIENT)
Dept: ELECTROPHYSIOLOGY | Facility: CLINIC | Age: 76
End: 2021-03-26

## 2021-03-26 ENCOUNTER — HOSPITAL ENCOUNTER (INPATIENT)
Facility: HOSPITAL | Age: 76
LOS: 7 days | Discharge: HOME | DRG: 219 | End: 2021-04-02
Attending: THORACIC SURGERY (CARDIOTHORACIC VASCULAR SURGERY) | Admitting: THORACIC SURGERY (CARDIOTHORACIC VASCULAR SURGERY)
Payer: OTHER GOVERNMENT

## 2021-03-26 ENCOUNTER — ANESTHESIA (OUTPATIENT)
Dept: SURGERY | Facility: HOSPITAL | Age: 76
DRG: 219 | End: 2021-03-26
Payer: OTHER GOVERNMENT

## 2021-03-26 DIAGNOSIS — N18.30 STAGE 3 CHRONIC KIDNEY DISEASE, UNSPECIFIED WHETHER STAGE 3A OR 3B CKD: ICD-10-CM

## 2021-03-26 DIAGNOSIS — I25.119 CORONARY ARTERY DISEASE INVOLVING NATIVE CORONARY ARTERY OF NATIVE HEART WITH ANGINA PECTORIS: ICD-10-CM

## 2021-03-26 DIAGNOSIS — E11.9 TYPE 2 DIABETES MELLITUS WITHOUT COMPLICATION, WITHOUT LONG-TERM CURRENT USE OF INSULIN: ICD-10-CM

## 2021-03-26 DIAGNOSIS — Z98.890 S/P TVR (TRICUSPID VALVE REPAIR): Primary | ICD-10-CM

## 2021-03-26 DIAGNOSIS — I49.9 ARRHYTHMIA: ICD-10-CM

## 2021-03-26 DIAGNOSIS — E66.9 OBESITY, UNSPECIFIED CLASSIFICATION, UNSPECIFIED OBESITY TYPE, UNSPECIFIED WHETHER SERIOUS COMORBIDITY PRESENT: ICD-10-CM

## 2021-03-26 DIAGNOSIS — Z95.1 S/P CABG (CORONARY ARTERY BYPASS GRAFT): ICD-10-CM

## 2021-03-26 DIAGNOSIS — I25.10 CAD (CORONARY ARTERY DISEASE): ICD-10-CM

## 2021-03-26 DIAGNOSIS — I35.8 OTHER NONRHEUMATIC AORTIC VALVE DISORDERS: Primary | ICD-10-CM

## 2021-03-26 DIAGNOSIS — I25.10 CORONARY ARTERY DISEASE: ICD-10-CM

## 2021-03-26 DIAGNOSIS — N17.9 AKI (ACUTE KIDNEY INJURY): ICD-10-CM

## 2021-03-26 DIAGNOSIS — I25.10 CORONARY ARTERY DISEASE INVOLVING NATIVE CORONARY ARTERY OF NATIVE HEART WITHOUT ANGINA PECTORIS: ICD-10-CM

## 2021-03-26 LAB
ALBUMIN SERPL BCP-MCNC: 2.4 G/DL (ref 3.5–5.2)
ALBUMIN SERPL BCP-MCNC: 3.1 G/DL (ref 3.5–5.2)
ALLENS TEST: ABNORMAL
ALP SERPL-CCNC: 40 U/L (ref 55–135)
ALP SERPL-CCNC: 42 U/L (ref 55–135)
ALT SERPL W/O P-5'-P-CCNC: 13 U/L (ref 10–44)
ALT SERPL W/O P-5'-P-CCNC: 13 U/L (ref 10–44)
ANION GAP SERPL CALC-SCNC: 12 MMOL/L (ref 8–16)
ANION GAP SERPL CALC-SCNC: 14 MMOL/L (ref 8–16)
ANISOCYTOSIS BLD QL SMEAR: SLIGHT
APTT BLDCRRT: 35.7 SEC (ref 21–32)
APTT BLDCRRT: 36.1 SEC (ref 21–32)
AST SERPL-CCNC: 67 U/L (ref 10–40)
AST SERPL-CCNC: 84 U/L (ref 10–40)
BASOPHILS # BLD AUTO: 0.01 K/UL (ref 0–0.2)
BASOPHILS # BLD AUTO: 0.01 K/UL (ref 0–0.2)
BASOPHILS # BLD AUTO: 0.02 K/UL (ref 0–0.2)
BASOPHILS NFR BLD: 0.1 % (ref 0–1.9)
BASOPHILS NFR BLD: 0.1 % (ref 0–1.9)
BASOPHILS NFR BLD: 0.3 % (ref 0–1.9)
BILIRUB SERPL-MCNC: 1 MG/DL (ref 0.1–1)
BILIRUB SERPL-MCNC: 1.4 MG/DL (ref 0.1–1)
BLD PROD TYP BPU: NORMAL
BLOOD UNIT EXPIRATION DATE: NORMAL
BLOOD UNIT TYPE CODE: 5100
BLOOD UNIT TYPE CODE: 6200
BLOOD UNIT TYPE CODE: 9500
BLOOD UNIT TYPE: NORMAL
BUN SERPL-MCNC: 20 MG/DL (ref 8–23)
BUN SERPL-MCNC: 22 MG/DL (ref 8–23)
BURR CELLS BLD QL SMEAR: ABNORMAL
CALCIUM SERPL-MCNC: 8.2 MG/DL (ref 8.7–10.5)
CALCIUM SERPL-MCNC: 8.3 MG/DL (ref 8.7–10.5)
CHLORIDE SERPL-SCNC: 113 MMOL/L (ref 95–110)
CHLORIDE SERPL-SCNC: 114 MMOL/L (ref 95–110)
CO2 SERPL-SCNC: 19 MMOL/L (ref 23–29)
CO2 SERPL-SCNC: 20 MMOL/L (ref 23–29)
CODING SYSTEM: NORMAL
CREAT SERPL-MCNC: 1.3 MG/DL (ref 0.5–1.4)
CREAT SERPL-MCNC: 1.5 MG/DL (ref 0.5–1.4)
DELSYS: ABNORMAL
DIFFERENTIAL METHOD: ABNORMAL
DISPENSE STATUS: NORMAL
EOSINOPHIL # BLD AUTO: 0 K/UL (ref 0–0.5)
EOSINOPHIL NFR BLD: 0.1 % (ref 0–8)
EOSINOPHIL NFR BLD: 0.6 % (ref 0–8)
EOSINOPHIL NFR BLD: 0.6 % (ref 0–8)
ERYTHROCYTE [DISTWIDTH] IN BLOOD BY AUTOMATED COUNT: 15.8 % (ref 11.5–14.5)
ERYTHROCYTE [DISTWIDTH] IN BLOOD BY AUTOMATED COUNT: 16.4 % (ref 11.5–14.5)
ERYTHROCYTE [DISTWIDTH] IN BLOOD BY AUTOMATED COUNT: 17.3 % (ref 11.5–14.5)
ERYTHROCYTE [SEDIMENTATION RATE] IN BLOOD BY WESTERGREN METHOD: 22 MM/H
EST. GFR  (AFRICAN AMERICAN): 52.6 ML/MIN/1.73 M^2
EST. GFR  (AFRICAN AMERICAN): >60 ML/MIN/1.73 M^2
EST. GFR  (NON AFRICAN AMERICAN): 45.5 ML/MIN/1.73 M^2
EST. GFR  (NON AFRICAN AMERICAN): 54.1 ML/MIN/1.73 M^2
FIBRINOGEN PPP-MCNC: 129 MG/DL (ref 182–366)
FIBRINOGEN PPP-MCNC: 174 MG/DL (ref 182–366)
FIBRINOGEN PPP-MCNC: 224 MG/DL (ref 182–366)
FIO2: 40
FIO2: 50
FIO2: 50
FIO2: 55
FIO2: 65
FIO2: 70
FIO2: 80
GLUCOSE SERPL-MCNC: 102 MG/DL (ref 70–110)
GLUCOSE SERPL-MCNC: 142 MG/DL (ref 70–110)
GLUCOSE SERPL-MCNC: 144 MG/DL (ref 70–110)
GLUCOSE SERPL-MCNC: 146 MG/DL (ref 70–110)
GLUCOSE SERPL-MCNC: 148 MG/DL (ref 70–110)
GLUCOSE SERPL-MCNC: 148 MG/DL (ref 70–110)
GLUCOSE SERPL-MCNC: 149 MG/DL (ref 70–110)
GLUCOSE SERPL-MCNC: 154 MG/DL (ref 70–110)
GLUCOSE SERPL-MCNC: 164 MG/DL (ref 70–110)
GLUCOSE SERPL-MCNC: 168 MG/DL (ref 70–110)
GLUCOSE SERPL-MCNC: 169 MG/DL (ref 70–110)
GLUCOSE SERPL-MCNC: 171 MG/DL (ref 70–110)
GLUCOSE SERPL-MCNC: 174 MG/DL (ref 70–110)
GLUCOSE SERPL-MCNC: 179 MG/DL (ref 70–110)
HCO3 UR-SCNC: 18.8 MMOL/L (ref 24–28)
HCO3 UR-SCNC: 19 MMOL/L (ref 24–28)
HCO3 UR-SCNC: 19.1 MMOL/L (ref 24–28)
HCO3 UR-SCNC: 19.5 MMOL/L (ref 24–28)
HCO3 UR-SCNC: 20 MMOL/L (ref 24–28)
HCO3 UR-SCNC: 22.2 MMOL/L (ref 24–28)
HCO3 UR-SCNC: 23.5 MMOL/L (ref 24–28)
HCO3 UR-SCNC: 23.8 MMOL/L (ref 24–28)
HCO3 UR-SCNC: 23.9 MMOL/L (ref 24–28)
HCO3 UR-SCNC: 24 MMOL/L (ref 24–28)
HCO3 UR-SCNC: 24.7 MMOL/L (ref 24–28)
HCO3 UR-SCNC: 25.5 MMOL/L (ref 24–28)
HCO3 UR-SCNC: 25.8 MMOL/L (ref 24–28)
HCO3 UR-SCNC: 26.1 MMOL/L (ref 24–28)
HCO3 UR-SCNC: 26.2 MMOL/L (ref 24–28)
HCO3 UR-SCNC: 26.6 MMOL/L (ref 24–28)
HCO3 UR-SCNC: 26.9 MMOL/L (ref 24–28)
HCO3 UR-SCNC: 27.5 MMOL/L (ref 24–28)
HCO3 UR-SCNC: 29.6 MMOL/L (ref 24–28)
HCT VFR BLD AUTO: 20.9 % (ref 40–54)
HCT VFR BLD AUTO: 24.8 % (ref 40–54)
HCT VFR BLD AUTO: 25.8 % (ref 40–54)
HCT VFR BLD CALC: 18 %PCV (ref 36–54)
HCT VFR BLD CALC: 18 %PCV (ref 36–54)
HCT VFR BLD CALC: 20 %PCV (ref 36–54)
HCT VFR BLD CALC: 21 %PCV (ref 36–54)
HCT VFR BLD CALC: 22 %PCV (ref 36–54)
HCT VFR BLD CALC: 23 %PCV (ref 36–54)
HCT VFR BLD CALC: 24 %PCV (ref 36–54)
HCT VFR BLD CALC: 25 %PCV (ref 36–54)
HCT VFR BLD CALC: 26 %PCV (ref 36–54)
HCT VFR BLD CALC: 26 %PCV (ref 36–54)
HCT VFR BLD CALC: 27 %PCV (ref 36–54)
HCT VFR BLD CALC: 31 %PCV (ref 36–54)
HGB BLD-MCNC: 6.8 G/DL (ref 14–18)
HGB BLD-MCNC: 7.9 G/DL (ref 14–18)
HGB BLD-MCNC: 8.2 G/DL (ref 14–18)
HYPOCHROMIA BLD QL SMEAR: ABNORMAL
IMM GRANULOCYTES # BLD AUTO: 0.02 K/UL (ref 0–0.04)
IMM GRANULOCYTES # BLD AUTO: 0.03 K/UL (ref 0–0.04)
IMM GRANULOCYTES # BLD AUTO: 0.05 K/UL (ref 0–0.04)
IMM GRANULOCYTES NFR BLD AUTO: 0.3 % (ref 0–0.5)
IMM GRANULOCYTES NFR BLD AUTO: 0.4 % (ref 0–0.5)
IMM GRANULOCYTES NFR BLD AUTO: 0.6 % (ref 0–0.5)
INR PPP: 1.2 (ref 0.8–1.2)
INR PPP: 1.2 (ref 0.8–1.2)
INR PPP: 2.2 (ref 0.8–1.2)
LACTATE SERPL-SCNC: 3.6 MMOL/L (ref 0.5–2.2)
LDH SERPL L TO P-CCNC: 0.47 MMOL/L (ref 0.36–1.25)
LDH SERPL L TO P-CCNC: 0.71 MMOL/L (ref 0.36–1.25)
LDH SERPL L TO P-CCNC: 0.75 MMOL/L (ref 0.36–1.25)
LDH SERPL L TO P-CCNC: 2.29 MMOL/L (ref 0.36–1.25)
LDH SERPL L TO P-CCNC: 3.43 MMOL/L (ref 0.36–1.25)
LDH SERPL L TO P-CCNC: 4.56 MMOL/L (ref 0.36–1.25)
LDH SERPL L TO P-CCNC: 5.75 MMOL/L (ref 0.36–1.25)
LDH SERPL L TO P-CCNC: 6.36 MMOL/L (ref 0.36–1.25)
LDH SERPL L TO P-CCNC: 6.86 MMOL/L (ref 0.36–1.25)
LDH SERPL L TO P-CCNC: 6.87 MMOL/L (ref 0.36–1.25)
LDH SERPL L TO P-CCNC: 7.01 MMOL/L (ref 0.36–1.25)
LYMPHOCYTES # BLD AUTO: 0.6 K/UL (ref 1–4.8)
LYMPHOCYTES # BLD AUTO: 1 K/UL (ref 1–4.8)
LYMPHOCYTES # BLD AUTO: 1.4 K/UL (ref 1–4.8)
LYMPHOCYTES NFR BLD: 14.3 % (ref 18–48)
LYMPHOCYTES NFR BLD: 15.8 % (ref 18–48)
LYMPHOCYTES NFR BLD: 8.2 % (ref 18–48)
MAGNESIUM SERPL-MCNC: 3 MG/DL (ref 1.6–2.6)
MAGNESIUM SERPL-MCNC: 3.1 MG/DL (ref 1.6–2.6)
MCH RBC QN AUTO: 27.2 PG (ref 27–31)
MCH RBC QN AUTO: 27.6 PG (ref 27–31)
MCH RBC QN AUTO: 27.8 PG (ref 27–31)
MCHC RBC AUTO-ENTMCNC: 31.8 G/DL (ref 32–36)
MCHC RBC AUTO-ENTMCNC: 31.9 G/DL (ref 32–36)
MCHC RBC AUTO-ENTMCNC: 32.5 G/DL (ref 32–36)
MCV RBC AUTO: 85 FL (ref 82–98)
MCV RBC AUTO: 86 FL (ref 82–98)
MCV RBC AUTO: 88 FL (ref 82–98)
MODE: ABNORMAL
MONOCYTES # BLD AUTO: 0.2 K/UL (ref 0.3–1)
MONOCYTES # BLD AUTO: 0.4 K/UL (ref 0.3–1)
MONOCYTES # BLD AUTO: 0.5 K/UL (ref 0.3–1)
MONOCYTES NFR BLD: 2.9 % (ref 4–15)
MONOCYTES NFR BLD: 4.4 % (ref 4–15)
MONOCYTES NFR BLD: 7.4 % (ref 4–15)
NEUTROPHILS # BLD AUTO: 5.9 K/UL (ref 1.8–7.7)
NEUTROPHILS # BLD AUTO: 5.9 K/UL (ref 1.8–7.7)
NEUTROPHILS # BLD AUTO: 6.9 K/UL (ref 1.8–7.7)
NEUTROPHILS NFR BLD: 79 % (ref 38–73)
NEUTROPHILS NFR BLD: 81.5 % (ref 38–73)
NEUTROPHILS NFR BLD: 83.4 % (ref 38–73)
NRBC BLD-RTO: 0 /100 WBC
NUM UNITS TRANS FFP: NORMAL
NUM UNITS TRANS PACKED RBC: NORMAL
OVALOCYTES BLD QL SMEAR: ABNORMAL
PCO2 BLDA: 33.8 MMHG (ref 35–45)
PCO2 BLDA: 35 MMHG (ref 35–45)
PCO2 BLDA: 36.1 MMHG (ref 35–45)
PCO2 BLDA: 36.2 MMHG (ref 35–45)
PCO2 BLDA: 37.1 MMHG (ref 35–45)
PCO2 BLDA: 38.1 MMHG (ref 35–45)
PCO2 BLDA: 40.5 MMHG (ref 35–45)
PCO2 BLDA: 42.5 MMHG (ref 35–45)
PCO2 BLDA: 43.3 MMHG (ref 35–45)
PCO2 BLDA: 43.8 MMHG (ref 35–45)
PCO2 BLDA: 43.8 MMHG (ref 35–45)
PCO2 BLDA: 44.8 MMHG (ref 35–45)
PCO2 BLDA: 45.1 MMHG (ref 35–45)
PCO2 BLDA: 45.2 MMHG (ref 35–45)
PCO2 BLDA: 45.6 MMHG (ref 35–45)
PCO2 BLDA: 45.8 MMHG (ref 35–45)
PCO2 BLDA: 46.3 MMHG (ref 35–45)
PCO2 BLDA: 47.8 MMHG (ref 35–45)
PCO2 BLDA: 50.8 MMHG (ref 35–45)
PEEP: 5
PH SMN: 7.32 [PH] (ref 7.35–7.45)
PH SMN: 7.33 [PH] (ref 7.35–7.45)
PH SMN: 7.33 [PH] (ref 7.35–7.45)
PH SMN: 7.34 [PH] (ref 7.35–7.45)
PH SMN: 7.36 [PH] (ref 7.35–7.45)
PH SMN: 7.37 [PH] (ref 7.35–7.45)
PH SMN: 7.38 [PH] (ref 7.35–7.45)
PH SMN: 7.39 [PH] (ref 7.35–7.45)
PH SMN: 7.39 [PH] (ref 7.35–7.45)
PH SMN: 7.41 [PH] (ref 7.35–7.45)
PH SMN: 7.42 [PH] (ref 7.35–7.45)
PHOSPHATE SERPL-MCNC: 1.7 MG/DL (ref 2.7–4.5)
PHOSPHATE SERPL-MCNC: 2.7 MG/DL (ref 2.7–4.5)
PLATELET # BLD AUTO: 154 K/UL (ref 150–350)
PLATELET # BLD AUTO: 89 K/UL (ref 150–350)
PLATELET # BLD AUTO: 91 K/UL (ref 150–350)
PLATELET BLD QL SMEAR: ABNORMAL
PMV BLD AUTO: 10.3 FL (ref 9.2–12.9)
PMV BLD AUTO: 9.7 FL (ref 9.2–12.9)
PMV BLD AUTO: 9.9 FL (ref 9.2–12.9)
PO2 BLDA: 123 MMHG (ref 80–100)
PO2 BLDA: 247 MMHG (ref 80–100)
PO2 BLDA: 251 MMHG (ref 80–100)
PO2 BLDA: 254 MMHG (ref 80–100)
PO2 BLDA: 255 MMHG (ref 80–100)
PO2 BLDA: 257 MMHG (ref 80–100)
PO2 BLDA: 274 MMHG (ref 80–100)
PO2 BLDA: 278 MMHG (ref 80–100)
PO2 BLDA: 335 MMHG (ref 80–100)
PO2 BLDA: 359 MMHG (ref 80–100)
PO2 BLDA: 374 MMHG (ref 80–100)
PO2 BLDA: 44 MMHG (ref 40–60)
PO2 BLDA: 494 MMHG (ref 80–100)
PO2 BLDA: 52 MMHG (ref 40–60)
PO2 BLDA: 85 MMHG (ref 80–100)
PO2 BLDA: 90 MMHG (ref 80–100)
PO2 BLDA: 91 MMHG (ref 80–100)
PO2 BLDA: 92 MMHG (ref 80–100)
PO2 BLDA: 92 MMHG (ref 80–100)
POC BE: -1 MMOL/L
POC BE: -1 MMOL/L
POC BE: -2 MMOL/L
POC BE: -3 MMOL/L
POC BE: -5 MMOL/L
POC BE: -6 MMOL/L
POC BE: -7 MMOL/L
POC BE: 0 MMOL/L
POC BE: 0 MMOL/L
POC BE: 1 MMOL/L
POC BE: 2 MMOL/L
POC BE: 3 MMOL/L
POC BE: 4 MMOL/L
POC IONIZED CALCIUM: 0.94 MMOL/L (ref 1.06–1.42)
POC IONIZED CALCIUM: 1.03 MMOL/L (ref 1.06–1.42)
POC IONIZED CALCIUM: 1.07 MMOL/L (ref 1.06–1.42)
POC IONIZED CALCIUM: 1.07 MMOL/L (ref 1.06–1.42)
POC IONIZED CALCIUM: 1.08 MMOL/L (ref 1.06–1.42)
POC IONIZED CALCIUM: 1.09 MMOL/L (ref 1.06–1.42)
POC IONIZED CALCIUM: 1.1 MMOL/L (ref 1.06–1.42)
POC IONIZED CALCIUM: 1.11 MMOL/L (ref 1.06–1.42)
POC IONIZED CALCIUM: 1.11 MMOL/L (ref 1.06–1.42)
POC IONIZED CALCIUM: 1.14 MMOL/L (ref 1.06–1.42)
POC IONIZED CALCIUM: 1.15 MMOL/L (ref 1.06–1.42)
POC IONIZED CALCIUM: 1.2 MMOL/L (ref 1.06–1.42)
POC IONIZED CALCIUM: 1.2 MMOL/L (ref 1.06–1.42)
POC IONIZED CALCIUM: 1.21 MMOL/L (ref 1.06–1.42)
POC SATURATED O2: 100 % (ref 95–100)
POC SATURATED O2: 78 % (ref 95–100)
POC SATURATED O2: 84 % (ref 95–100)
POC SATURATED O2: 96 % (ref 95–100)
POC SATURATED O2: 97 % (ref 95–100)
POC SATURATED O2: 97 % (ref 95–100)
POC SATURATED O2: 99 % (ref 95–100)
POC TCO2: 20 MMOL/L (ref 23–27)
POC TCO2: 21 MMOL/L (ref 23–27)
POC TCO2: 21 MMOL/L (ref 23–27)
POC TCO2: 23 MMOL/L (ref 23–27)
POC TCO2: 25 MMOL/L (ref 23–27)
POC TCO2: 25 MMOL/L (ref 24–29)
POC TCO2: 26 MMOL/L (ref 23–27)
POC TCO2: 27 MMOL/L (ref 23–27)
POC TCO2: 27 MMOL/L (ref 24–29)
POC TCO2: 28 MMOL/L (ref 23–27)
POC TCO2: 28 MMOL/L (ref 23–27)
POC TCO2: 29 MMOL/L (ref 23–27)
POC TCO2: 31 MMOL/L (ref 23–27)
POCT GLUCOSE: 156 MG/DL (ref 70–110)
POCT GLUCOSE: 159 MG/DL (ref 70–110)
POCT GLUCOSE: 159 MG/DL (ref 70–110)
POCT GLUCOSE: 167 MG/DL (ref 70–110)
POCT GLUCOSE: 169 MG/DL (ref 70–110)
POCT GLUCOSE: 176 MG/DL (ref 70–110)
POCT GLUCOSE: 180 MG/DL (ref 70–110)
POCT GLUCOSE: 89 MG/DL (ref 70–110)
POIKILOCYTOSIS BLD QL SMEAR: SLIGHT
POLYCHROMASIA BLD QL SMEAR: ABNORMAL
POTASSIUM BLD-SCNC: 2.9 MMOL/L (ref 3.5–5.1)
POTASSIUM BLD-SCNC: 3.1 MMOL/L (ref 3.5–5.1)
POTASSIUM BLD-SCNC: 3.2 MMOL/L (ref 3.5–5.1)
POTASSIUM BLD-SCNC: 3.3 MMOL/L (ref 3.5–5.1)
POTASSIUM BLD-SCNC: 3.4 MMOL/L (ref 3.5–5.1)
POTASSIUM BLD-SCNC: 3.5 MMOL/L (ref 3.5–5.1)
POTASSIUM BLD-SCNC: 4 MMOL/L (ref 3.5–5.1)
POTASSIUM BLD-SCNC: 4 MMOL/L (ref 3.5–5.1)
POTASSIUM BLD-SCNC: 4.1 MMOL/L (ref 3.5–5.1)
POTASSIUM BLD-SCNC: 4.3 MMOL/L (ref 3.5–5.1)
POTASSIUM BLD-SCNC: 4.4 MMOL/L (ref 3.5–5.1)
POTASSIUM BLD-SCNC: 5 MMOL/L (ref 3.5–5.1)
POTASSIUM BLD-SCNC: 5.2 MMOL/L (ref 3.5–5.1)
POTASSIUM BLD-SCNC: 5.4 MMOL/L (ref 3.5–5.1)
POTASSIUM BLD-SCNC: 5.4 MMOL/L (ref 3.5–5.1)
POTASSIUM BLD-SCNC: 5.5 MMOL/L (ref 3.5–5.1)
POTASSIUM BLD-SCNC: 5.7 MMOL/L (ref 3.5–5.1)
POTASSIUM BLD-SCNC: 5.7 MMOL/L (ref 3.5–5.1)
POTASSIUM BLD-SCNC: 5.8 MMOL/L (ref 3.5–5.1)
POTASSIUM SERPL-SCNC: 3.3 MMOL/L (ref 3.5–5.1)
POTASSIUM SERPL-SCNC: 4.2 MMOL/L (ref 3.5–5.1)
POTASSIUM SERPL-SCNC: 4.2 MMOL/L (ref 3.5–5.1)
PROT SERPL-MCNC: 4.1 G/DL (ref 6–8.4)
PROT SERPL-MCNC: 4.7 G/DL (ref 6–8.4)
PROTHROMBIN TIME: 12.9 SEC (ref 9–12.5)
PROTHROMBIN TIME: 13 SEC (ref 9–12.5)
PROTHROMBIN TIME: 22.8 SEC (ref 9–12.5)
RBC # BLD AUTO: 2.46 M/UL (ref 4.6–6.2)
RBC # BLD AUTO: 2.9 M/UL (ref 4.6–6.2)
RBC # BLD AUTO: 2.95 M/UL (ref 4.6–6.2)
SAMPLE: ABNORMAL
SAMPLE: NORMAL
SITE: ABNORMAL
SODIUM BLD-SCNC: 140 MMOL/L (ref 136–145)
SODIUM BLD-SCNC: 141 MMOL/L (ref 136–145)
SODIUM BLD-SCNC: 142 MMOL/L (ref 136–145)
SODIUM BLD-SCNC: 144 MMOL/L (ref 136–145)
SODIUM BLD-SCNC: 145 MMOL/L (ref 136–145)
SODIUM BLD-SCNC: 146 MMOL/L (ref 136–145)
SODIUM BLD-SCNC: 147 MMOL/L (ref 136–145)
SODIUM BLD-SCNC: 148 MMOL/L (ref 136–145)
SODIUM BLD-SCNC: 149 MMOL/L (ref 136–145)
SODIUM SERPL-SCNC: 145 MMOL/L (ref 136–145)
SODIUM SERPL-SCNC: 147 MMOL/L (ref 136–145)
TRANS PLATPHERESIS VOL PATIENT: NORMAL ML
UNIT NUMBER: NORMAL
VT: 460
WBC # BLD AUTO: 7.04 K/UL (ref 3.9–12.7)
WBC # BLD AUTO: 7.18 K/UL (ref 3.9–12.7)
WBC # BLD AUTO: 8.73 K/UL (ref 3.9–12.7)

## 2021-03-26 PROCEDURE — 27100019 HC AMBU BAG ADULT/PED: Performed by: ANESTHESIOLOGY

## 2021-03-26 PROCEDURE — 94003 VENT MGMT INPAT SUBQ DAY: CPT

## 2021-03-26 PROCEDURE — P9012 CRYOPRECIPITATE EACH UNIT: HCPCS | Performed by: STUDENT IN AN ORGANIZED HEALTH CARE EDUCATION/TRAINING PROGRAM

## 2021-03-26 PROCEDURE — 83605 ASSAY OF LACTIC ACID: CPT

## 2021-03-26 PROCEDURE — 83735 ASSAY OF MAGNESIUM: CPT | Performed by: STUDENT IN AN ORGANIZED HEALTH CARE EDUCATION/TRAINING PROGRAM

## 2021-03-26 PROCEDURE — P9016 RBC LEUKOCYTES REDUCED: HCPCS | Performed by: THORACIC SURGERY (CARDIOTHORACIC VASCULAR SURGERY)

## 2021-03-26 PROCEDURE — 33405 REPLACEMENT AORTIC VALVE OPN: CPT | Mod: 51,,, | Performed by: THORACIC SURGERY (CARDIOTHORACIC VASCULAR SURGERY)

## 2021-03-26 PROCEDURE — 86920 COMPATIBILITY TEST SPIN: CPT | Performed by: STUDENT IN AN ORGANIZED HEALTH CARE EDUCATION/TRAINING PROGRAM

## 2021-03-26 PROCEDURE — 27201037 HC PRESSURE MONITORING SET UP

## 2021-03-26 PROCEDURE — 84295 ASSAY OF SERUM SODIUM: CPT

## 2021-03-26 PROCEDURE — 88304 TISSUE EXAM BY PATHOLOGIST: CPT | Mod: 26,,, | Performed by: PATHOLOGY

## 2021-03-26 PROCEDURE — P9017 PLASMA 1 DONOR FRZ W/IN 8 HR: HCPCS | Performed by: THORACIC SURGERY (CARDIOTHORACIC VASCULAR SURGERY)

## 2021-03-26 PROCEDURE — 27000221 HC OXYGEN, UP TO 24 HOURS

## 2021-03-26 PROCEDURE — 27202608 HC CANNULA, MISC

## 2021-03-26 PROCEDURE — 93010 EKG 12-LEAD: ICD-10-PCS | Mod: ,,, | Performed by: INTERNAL MEDICINE

## 2021-03-26 PROCEDURE — 85384 FIBRINOGEN ACTIVITY: CPT | Performed by: THORACIC SURGERY (CARDIOTHORACIC VASCULAR SURGERY)

## 2021-03-26 PROCEDURE — C1729 CATH, DRAINAGE: HCPCS | Performed by: THORACIC SURGERY (CARDIOTHORACIC VASCULAR SURGERY)

## 2021-03-26 PROCEDURE — 37000009 HC ANESTHESIA EA ADD 15 MINS: Performed by: THORACIC SURGERY (CARDIOTHORACIC VASCULAR SURGERY)

## 2021-03-26 PROCEDURE — P9035 PLATELET PHERES LEUKOREDUCED: HCPCS | Performed by: THORACIC SURGERY (CARDIOTHORACIC VASCULAR SURGERY)

## 2021-03-26 PROCEDURE — 33426 PR MITRALPLASTY W PROSTHETIC RING: ICD-10-PCS | Mod: 51,,, | Performed by: THORACIC SURGERY (CARDIOTHORACIC VASCULAR SURGERY)

## 2021-03-26 PROCEDURE — 85014 HEMATOCRIT: CPT

## 2021-03-26 PROCEDURE — P9012 CRYOPRECIPITATE EACH UNIT: HCPCS | Performed by: THORACIC SURGERY (CARDIOTHORACIC VASCULAR SURGERY)

## 2021-03-26 PROCEDURE — 36000713 HC OR TIME LEV V EA ADD 15 MIN: Performed by: THORACIC SURGERY (CARDIOTHORACIC VASCULAR SURGERY)

## 2021-03-26 PROCEDURE — 88304 PR  SURG PATH,LEVEL III: ICD-10-PCS | Mod: 26,,, | Performed by: PATHOLOGY

## 2021-03-26 PROCEDURE — C1894 INTRO/SHEATH, NON-LASER: HCPCS | Performed by: ANESTHESIOLOGY

## 2021-03-26 PROCEDURE — 82330 ASSAY OF CALCIUM: CPT

## 2021-03-26 PROCEDURE — 86965 POOLING BLOOD PLATELETS: CPT | Performed by: THORACIC SURGERY (CARDIOTHORACIC VASCULAR SURGERY)

## 2021-03-26 PROCEDURE — 36620 ARTERIAL: ICD-10-PCS | Mod: 59,,, | Performed by: ANESTHESIOLOGY

## 2021-03-26 PROCEDURE — P9035 PLATELET PHERES LEUKOREDUCED: HCPCS | Performed by: STUDENT IN AN ORGANIZED HEALTH CARE EDUCATION/TRAINING PROGRAM

## 2021-03-26 PROCEDURE — 33464 PR VALVULOPLAS TRICUS W RING INSERT: ICD-10-PCS | Mod: 22,,, | Performed by: THORACIC SURGERY (CARDIOTHORACIC VASCULAR SURGERY)

## 2021-03-26 PROCEDURE — 93010 ELECTROCARDIOGRAM REPORT: CPT | Mod: ,,, | Performed by: INTERNAL MEDICINE

## 2021-03-26 PROCEDURE — D9220A PRA ANESTHESIA: ICD-10-PCS | Mod: ,,, | Performed by: ANESTHESIOLOGY

## 2021-03-26 PROCEDURE — 86965 POOLING BLOOD PLATELETS: CPT | Performed by: STUDENT IN AN ORGANIZED HEALTH CARE EDUCATION/TRAINING PROGRAM

## 2021-03-26 PROCEDURE — 88305 TISSUE EXAM BY PATHOLOGIST: ICD-10-PCS | Mod: 26,,, | Performed by: PATHOLOGY

## 2021-03-26 PROCEDURE — 85520 HEPARIN ASSAY: CPT

## 2021-03-26 PROCEDURE — 82962 GLUCOSE BLOOD TEST: CPT | Performed by: THORACIC SURGERY (CARDIOTHORACIC VASCULAR SURGERY)

## 2021-03-26 PROCEDURE — 63600175 PHARM REV CODE 636 W HCPCS: Performed by: STUDENT IN AN ORGANIZED HEALTH CARE EDUCATION/TRAINING PROGRAM

## 2021-03-26 PROCEDURE — 99291 CRITICAL CARE FIRST HOUR: CPT | Mod: ,,, | Performed by: ANESTHESIOLOGY

## 2021-03-26 PROCEDURE — 25000003 PHARM REV CODE 250: Performed by: STUDENT IN AN ORGANIZED HEALTH CARE EDUCATION/TRAINING PROGRAM

## 2021-03-26 PROCEDURE — D9220A PRA ANESTHESIA: Mod: ,,, | Performed by: ANESTHESIOLOGY

## 2021-03-26 PROCEDURE — 94761 N-INVAS EAR/PLS OXIMETRY MLT: CPT

## 2021-03-26 PROCEDURE — 93503 SWAN GANZ LINE: ICD-10-PCS | Mod: 59,,, | Performed by: ANESTHESIOLOGY

## 2021-03-26 PROCEDURE — 88304 TISSUE EXAM BY PATHOLOGIST: CPT | Performed by: PATHOLOGY

## 2021-03-26 PROCEDURE — 36000712 HC OR TIME LEV V 1ST 15 MIN: Performed by: THORACIC SURGERY (CARDIOTHORACIC VASCULAR SURGERY)

## 2021-03-26 PROCEDURE — 82803 BLOOD GASES ANY COMBINATION: CPT

## 2021-03-26 PROCEDURE — 33508 PR ENDOSCOPY W/VIDEO-ASST VEIN HARVEST,CABG: ICD-10-PCS | Mod: 59,,, | Performed by: THORACIC SURGERY (CARDIOTHORACIC VASCULAR SURGERY)

## 2021-03-26 PROCEDURE — 27800903 OPTIME MED/SURG SUP & DEVICES OTHER IMPLANTS: Performed by: THORACIC SURGERY (CARDIOTHORACIC VASCULAR SURGERY)

## 2021-03-26 PROCEDURE — 33508 ENDOSCOPIC VEIN HARVEST: CPT | Mod: 59,,, | Performed by: THORACIC SURGERY (CARDIOTHORACIC VASCULAR SURGERY)

## 2021-03-26 PROCEDURE — 33259 PR ABLATE/ RECONSTUCT ATRIA, W OTHER PROCED EXTENS W/ BYPASS: ICD-10-PCS | Mod: ,,, | Performed by: THORACIC SURGERY (CARDIOTHORACIC VASCULAR SURGERY)

## 2021-03-26 PROCEDURE — 88311 PR  DECALCIFY TISSUE: ICD-10-PCS | Mod: 26,,, | Performed by: PATHOLOGY

## 2021-03-26 PROCEDURE — 94002 VENT MGMT INPAT INIT DAY: CPT

## 2021-03-26 PROCEDURE — 27100026 HC SHUNT SENSOR, TERUMO

## 2021-03-26 PROCEDURE — 33518 CABG ARTERY-VEIN TWO: CPT | Mod: ,,, | Performed by: THORACIC SURGERY (CARDIOTHORACIC VASCULAR SURGERY)

## 2021-03-26 PROCEDURE — 33533 CABG ARTERIAL SINGLE: CPT | Mod: 51,,, | Performed by: THORACIC SURGERY (CARDIOTHORACIC VASCULAR SURGERY)

## 2021-03-26 PROCEDURE — 63600175 PHARM REV CODE 636 W HCPCS: Performed by: THORACIC SURGERY (CARDIOTHORACIC VASCULAR SURGERY)

## 2021-03-26 PROCEDURE — 80053 COMPREHEN METABOLIC PANEL: CPT | Mod: 91 | Performed by: STUDENT IN AN ORGANIZED HEALTH CARE EDUCATION/TRAINING PROGRAM

## 2021-03-26 PROCEDURE — C2618 PROBE/NEEDLE, CRYO: HCPCS | Performed by: THORACIC SURGERY (CARDIOTHORACIC VASCULAR SURGERY)

## 2021-03-26 PROCEDURE — P9017 PLASMA 1 DONOR FRZ W/IN 8 HR: HCPCS | Performed by: STUDENT IN AN ORGANIZED HEALTH CARE EDUCATION/TRAINING PROGRAM

## 2021-03-26 PROCEDURE — P9016 RBC LEUKOCYTES REDUCED: HCPCS | Performed by: STUDENT IN AN ORGANIZED HEALTH CARE EDUCATION/TRAINING PROGRAM

## 2021-03-26 PROCEDURE — 33426 REPAIR OF MITRAL VALVE: CPT | Mod: 51,,, | Performed by: THORACIC SURGERY (CARDIOTHORACIC VASCULAR SURGERY)

## 2021-03-26 PROCEDURE — 99900035 HC TECH TIME PER 15 MIN (STAT)

## 2021-03-26 PROCEDURE — 27000175 HC ADULT BYPASS PUMP

## 2021-03-26 PROCEDURE — 93005 ELECTROCARDIOGRAM TRACING: CPT

## 2021-03-26 PROCEDURE — 25000003 PHARM REV CODE 250: Performed by: PHYSICIAN ASSISTANT

## 2021-03-26 PROCEDURE — 85025 COMPLETE CBC W/AUTO DIFF WBC: CPT | Performed by: THORACIC SURGERY (CARDIOTHORACIC VASCULAR SURGERY)

## 2021-03-26 PROCEDURE — C1751 CATH, INF, PER/CENT/MIDLINE: HCPCS | Performed by: ANESTHESIOLOGY

## 2021-03-26 PROCEDURE — 33405 PR REPLACE AORT VALV,PROSTH VALV: ICD-10-PCS | Mod: 51,,, | Performed by: THORACIC SURGERY (CARDIOTHORACIC VASCULAR SURGERY)

## 2021-03-26 PROCEDURE — 88311 DECALCIFY TISSUE: CPT | Mod: 26,,, | Performed by: PATHOLOGY

## 2021-03-26 PROCEDURE — 84132 ASSAY OF SERUM POTASSIUM: CPT

## 2021-03-26 PROCEDURE — 88305 TISSUE EXAM BY PATHOLOGIST: CPT | Mod: 26,,, | Performed by: PATHOLOGY

## 2021-03-26 PROCEDURE — 84100 ASSAY OF PHOSPHORUS: CPT | Performed by: STUDENT IN AN ORGANIZED HEALTH CARE EDUCATION/TRAINING PROGRAM

## 2021-03-26 PROCEDURE — 27000191 HC C-V MONITORING

## 2021-03-26 PROCEDURE — 27200953 HC CARDIOPLEGIA SYSTEM

## 2021-03-26 PROCEDURE — 27201423 OPTIME MED/SURG SUP & DEVICES STERILE SUPPLY: Performed by: THORACIC SURGERY (CARDIOTHORACIC VASCULAR SURGERY)

## 2021-03-26 PROCEDURE — 36592 COLLECT BLOOD FROM PICC: CPT

## 2021-03-26 PROCEDURE — 85384 FIBRINOGEN ACTIVITY: CPT | Mod: 91 | Performed by: STUDENT IN AN ORGANIZED HEALTH CARE EDUCATION/TRAINING PROGRAM

## 2021-03-26 PROCEDURE — 25000003 PHARM REV CODE 250: Performed by: THORACIC SURGERY (CARDIOTHORACIC VASCULAR SURGERY)

## 2021-03-26 PROCEDURE — 37799 UNLISTED PX VASCULAR SURGERY: CPT

## 2021-03-26 PROCEDURE — 33518 PR CABG, ARTERY-VEIN, TWO: ICD-10-PCS | Mod: ,,, | Performed by: THORACIC SURGERY (CARDIOTHORACIC VASCULAR SURGERY)

## 2021-03-26 PROCEDURE — 86920 COMPATIBILITY TEST SPIN: CPT | Performed by: THORACIC SURGERY (CARDIOTHORACIC VASCULAR SURGERY)

## 2021-03-26 PROCEDURE — 85025 COMPLETE CBC W/AUTO DIFF WBC: CPT | Mod: 91 | Performed by: STUDENT IN AN ORGANIZED HEALTH CARE EDUCATION/TRAINING PROGRAM

## 2021-03-26 PROCEDURE — 83605 ASSAY OF LACTIC ACID: CPT | Performed by: STUDENT IN AN ORGANIZED HEALTH CARE EDUCATION/TRAINING PROGRAM

## 2021-03-26 PROCEDURE — 33464 VALVULOPLASTY TRICUSPID: CPT | Mod: 22,,, | Performed by: THORACIC SURGERY (CARDIOTHORACIC VASCULAR SURGERY)

## 2021-03-26 PROCEDURE — 27100021 HC MULTIPORT INFUSION MANIFOLD: Performed by: ANESTHESIOLOGY

## 2021-03-26 PROCEDURE — 85610 PROTHROMBIN TIME: CPT | Performed by: THORACIC SURGERY (CARDIOTHORACIC VASCULAR SURGERY)

## 2021-03-26 PROCEDURE — 88311 DECALCIFY TISSUE: CPT | Performed by: PATHOLOGY

## 2021-03-26 PROCEDURE — 27000445 HC TEMPORARY PACEMAKER LEADS

## 2021-03-26 PROCEDURE — 33259 ABLATE ATRIA W/BYPASS ADD-ON: CPT | Mod: ,,, | Performed by: THORACIC SURGERY (CARDIOTHORACIC VASCULAR SURGERY)

## 2021-03-26 PROCEDURE — 27100088 HC CELL SAVER

## 2021-03-26 PROCEDURE — 36620 INSERTION CATHETER ARTERY: CPT | Mod: 59,,, | Performed by: ANESTHESIOLOGY

## 2021-03-26 PROCEDURE — 27200678 HC TRANSDUCER MONITOR KIT TRIPLE: Performed by: ANESTHESIOLOGY

## 2021-03-26 PROCEDURE — 93312 ECHO TRANSESOPHAGEAL: CPT | Mod: 26,59,, | Performed by: ANESTHESIOLOGY

## 2021-03-26 PROCEDURE — 85730 THROMBOPLASTIN TIME PARTIAL: CPT | Performed by: STUDENT IN AN ORGANIZED HEALTH CARE EDUCATION/TRAINING PROGRAM

## 2021-03-26 PROCEDURE — 88305 TISSUE EXAM BY PATHOLOGIST: CPT | Performed by: PATHOLOGY

## 2021-03-26 PROCEDURE — 85610 PROTHROMBIN TIME: CPT | Mod: 91 | Performed by: STUDENT IN AN ORGANIZED HEALTH CARE EDUCATION/TRAINING PROGRAM

## 2021-03-26 PROCEDURE — 27800595 HC HEART VALVES

## 2021-03-26 PROCEDURE — 99291 PR CRITICAL CARE, E/M 30-74 MINUTES: ICD-10-PCS | Mod: ,,, | Performed by: ANESTHESIOLOGY

## 2021-03-26 PROCEDURE — P9045 ALBUMIN (HUMAN), 5%, 250 ML: HCPCS | Mod: JG | Performed by: STUDENT IN AN ORGANIZED HEALTH CARE EDUCATION/TRAINING PROGRAM

## 2021-03-26 PROCEDURE — 93312 PR ECHO HEART,TRANSESOPHAGEAL: ICD-10-PCS | Mod: 26,59,, | Performed by: ANESTHESIOLOGY

## 2021-03-26 PROCEDURE — 20000000 HC ICU ROOM

## 2021-03-26 PROCEDURE — 93503 INSERT/PLACE HEART CATHETER: CPT | Mod: 59,,, | Performed by: ANESTHESIOLOGY

## 2021-03-26 PROCEDURE — 37000008 HC ANESTHESIA 1ST 15 MINUTES: Performed by: THORACIC SURGERY (CARDIOTHORACIC VASCULAR SURGERY)

## 2021-03-26 PROCEDURE — 33533 PR CABG, ARTERIAL, SINGLE: ICD-10-PCS | Mod: 51,,, | Performed by: THORACIC SURGERY (CARDIOTHORACIC VASCULAR SURGERY)

## 2021-03-26 PROCEDURE — 27801617 HC VALVE, PERCEVAL

## 2021-03-26 DEVICE — IMPLANTABLE DEVICE: Type: IMPLANTABLE DEVICE | Site: HEART | Status: FUNCTIONAL

## 2021-03-26 RX ORDER — NITROGLYCERIN 5 MG/ML
INJECTION, SOLUTION INTRAVENOUS
Status: DISCONTINUED | OUTPATIENT
Start: 2021-03-26 | End: 2021-03-26

## 2021-03-26 RX ORDER — ROCURONIUM BROMIDE 10 MG/ML
INJECTION, SOLUTION INTRAVENOUS
Status: DISCONTINUED | OUTPATIENT
Start: 2021-03-26 | End: 2021-03-26

## 2021-03-26 RX ORDER — HYDROCODONE BITARTRATE AND ACETAMINOPHEN 500; 5 MG/1; MG/1
TABLET ORAL
Status: DISCONTINUED | OUTPATIENT
Start: 2021-03-26 | End: 2021-03-29

## 2021-03-26 RX ORDER — CALCIUM CHLORIDE INJECTION 100 MG/ML
INJECTION, SOLUTION INTRAVENOUS
Status: DISCONTINUED | OUTPATIENT
Start: 2021-03-26 | End: 2021-03-26

## 2021-03-26 RX ORDER — PHENYLEPHRINE HYDROCHLORIDE 10 MG/ML
INJECTION INTRAVENOUS
Status: DISCONTINUED | OUTPATIENT
Start: 2021-03-26 | End: 2021-03-26

## 2021-03-26 RX ORDER — DEXMEDETOMIDINE HYDROCHLORIDE 100 UG/ML
INJECTION, SOLUTION INTRAVENOUS CONTINUOUS PRN
Status: DISCONTINUED | OUTPATIENT
Start: 2021-03-26 | End: 2021-03-26

## 2021-03-26 RX ORDER — VASOPRESSIN 20 [USP'U]/ML
INJECTION, SOLUTION INTRAMUSCULAR; SUBCUTANEOUS
Status: DISCONTINUED | OUTPATIENT
Start: 2021-03-26 | End: 2021-03-26

## 2021-03-26 RX ORDER — CEFAZOLIN SODIUM 1 G/3ML
2 INJECTION, POWDER, FOR SOLUTION INTRAMUSCULAR; INTRAVENOUS
Status: COMPLETED | OUTPATIENT
Start: 2021-03-26 | End: 2021-03-27

## 2021-03-26 RX ORDER — MAGNESIUM SULFATE HEPTAHYDRATE 40 MG/ML
4 INJECTION, SOLUTION INTRAVENOUS
Status: DISCONTINUED | OUTPATIENT
Start: 2021-03-26 | End: 2021-03-31

## 2021-03-26 RX ORDER — FENTANYL CITRATE 50 UG/ML
INJECTION, SOLUTION INTRAMUSCULAR; INTRAVENOUS
Status: DISCONTINUED | OUTPATIENT
Start: 2021-03-26 | End: 2021-03-26

## 2021-03-26 RX ORDER — FAMOTIDINE 10 MG/ML
20 INJECTION INTRAVENOUS DAILY
Status: DISCONTINUED | OUTPATIENT
Start: 2021-03-27 | End: 2021-03-30

## 2021-03-26 RX ORDER — MUPIROCIN 20 MG/G
OINTMENT TOPICAL 2 TIMES DAILY
Status: COMPLETED | OUTPATIENT
Start: 2021-03-26 | End: 2021-03-31

## 2021-03-26 RX ORDER — POLYETHYLENE GLYCOL 3350 17 G/17G
17 POWDER, FOR SOLUTION ORAL DAILY
Status: DISCONTINUED | OUTPATIENT
Start: 2021-03-27 | End: 2021-04-02 | Stop reason: HOSPADM

## 2021-03-26 RX ORDER — LIDOCAINE HYDROCHLORIDE 20 MG/ML
INJECTION, SOLUTION EPIDURAL; INFILTRATION; INTRACAUDAL; PERINEURAL
Status: DISCONTINUED | OUTPATIENT
Start: 2021-03-26 | End: 2021-03-26

## 2021-03-26 RX ORDER — CEFAZOLIN SODIUM 1 G/3ML
INJECTION, POWDER, FOR SOLUTION INTRAMUSCULAR; INTRAVENOUS
Status: DISCONTINUED | OUTPATIENT
Start: 2021-03-26 | End: 2021-03-26

## 2021-03-26 RX ORDER — ASPIRIN 325 MG
325 TABLET ORAL DAILY
Status: DISCONTINUED | OUTPATIENT
Start: 2021-03-27 | End: 2021-03-26

## 2021-03-26 RX ORDER — TRANEXAMIC ACID 100 MG/ML
INJECTION, SOLUTION INTRAVENOUS
Status: DISCONTINUED | OUTPATIENT
Start: 2021-03-26 | End: 2021-03-26

## 2021-03-26 RX ORDER — PROPOFOL 10 MG/ML
0-50 INJECTION, EMULSION INTRAVENOUS CONTINUOUS
Status: DISCONTINUED | OUTPATIENT
Start: 2021-03-26 | End: 2021-03-28

## 2021-03-26 RX ORDER — LIDOCAINE HYDROCHLORIDE 10 MG/ML
1 INJECTION, SOLUTION EPIDURAL; INFILTRATION; INTRACAUDAL; PERINEURAL
Status: DISCONTINUED | OUTPATIENT
Start: 2021-03-26 | End: 2021-03-26

## 2021-03-26 RX ORDER — INDOMETHACIN 25 MG/1
50 CAPSULE ORAL ONCE
Status: COMPLETED | OUTPATIENT
Start: 2021-03-27 | End: 2021-03-26

## 2021-03-26 RX ORDER — INDOMETHACIN 25 MG/1
50 CAPSULE ORAL ONCE
Status: COMPLETED | OUTPATIENT
Start: 2021-03-26 | End: 2021-03-26

## 2021-03-26 RX ORDER — ASPIRIN 325 MG
325 TABLET ORAL ONCE
Status: DISCONTINUED | OUTPATIENT
Start: 2021-03-26 | End: 2021-03-26

## 2021-03-26 RX ORDER — PROTAMINE SULFATE 10 MG/ML
50 INJECTION, SOLUTION INTRAVENOUS ONCE
Status: COMPLETED | OUTPATIENT
Start: 2021-03-26 | End: 2021-03-26

## 2021-03-26 RX ORDER — ALBUMIN HUMAN 50 G/1000ML
SOLUTION INTRAVENOUS
Status: DISPENSED
Start: 2021-03-26 | End: 2021-03-27

## 2021-03-26 RX ORDER — HYDROMORPHONE HYDROCHLORIDE 1 MG/ML
1 INJECTION, SOLUTION INTRAMUSCULAR; INTRAVENOUS; SUBCUTANEOUS EVERY 4 HOURS PRN
Status: DISCONTINUED | OUTPATIENT
Start: 2021-03-26 | End: 2021-03-29

## 2021-03-26 RX ORDER — MIDAZOLAM HYDROCHLORIDE 1 MG/ML
INJECTION INTRAMUSCULAR; INTRAVENOUS
Status: DISCONTINUED | OUTPATIENT
Start: 2021-03-26 | End: 2021-03-26

## 2021-03-26 RX ORDER — ALBUMIN HUMAN 50 G/1000ML
25 SOLUTION INTRAVENOUS ONCE
Status: COMPLETED | OUTPATIENT
Start: 2021-03-26 | End: 2021-03-26

## 2021-03-26 RX ORDER — PAPAVERINE HYDROCHLORIDE 30 MG/ML
INJECTION INTRAMUSCULAR; INTRAVENOUS
Status: DISCONTINUED | OUTPATIENT
Start: 2021-03-26 | End: 2021-03-26

## 2021-03-26 RX ORDER — TRANEXAMIC ACID 100 MG/ML
INJECTION, SOLUTION INTRAVENOUS CONTINUOUS PRN
Status: DISCONTINUED | OUTPATIENT
Start: 2021-03-26 | End: 2021-03-26

## 2021-03-26 RX ORDER — NEOSTIGMINE METHYLSULFATE 0.5 MG/ML
INJECTION, SOLUTION INTRAVENOUS
Status: DISCONTINUED | OUTPATIENT
Start: 2021-03-26 | End: 2021-03-26

## 2021-03-26 RX ORDER — ALBUMIN HUMAN 50 G/1000ML
50 SOLUTION INTRAVENOUS ONCE
Status: COMPLETED | OUTPATIENT
Start: 2021-03-26 | End: 2021-03-26

## 2021-03-26 RX ORDER — POTASSIUM CHLORIDE 14.9 MG/ML
40 INJECTION INTRAVENOUS
Status: DISCONTINUED | OUTPATIENT
Start: 2021-03-26 | End: 2021-03-31

## 2021-03-26 RX ORDER — MUPIROCIN 20 MG/G
1 OINTMENT TOPICAL
Status: DISCONTINUED | OUTPATIENT
Start: 2021-03-26 | End: 2021-03-26

## 2021-03-26 RX ORDER — MILRINONE LACTATE 0.2 MG/ML
0.38 INJECTION, SOLUTION INTRAVENOUS CONTINUOUS
Status: DISCONTINUED | OUTPATIENT
Start: 2021-03-26 | End: 2021-03-30

## 2021-03-26 RX ORDER — PROPOFOL 10 MG/ML
VIAL (ML) INTRAVENOUS
Status: DISCONTINUED | OUTPATIENT
Start: 2021-03-26 | End: 2021-03-26

## 2021-03-26 RX ORDER — NOREPINEPHRINE BITARTRATE 1 MG/ML
INJECTION, SOLUTION INTRAVENOUS
Status: DISCONTINUED | OUTPATIENT
Start: 2021-03-26 | End: 2021-03-26

## 2021-03-26 RX ORDER — METOPROLOL TARTRATE 25 MG/1
25 TABLET, FILM COATED ORAL
Status: DISCONTINUED | OUTPATIENT
Start: 2021-03-26 | End: 2021-03-26

## 2021-03-26 RX ORDER — ALBUMIN HUMAN 50 G/1000ML
25 SOLUTION INTRAVENOUS ONCE
Status: DISCONTINUED | OUTPATIENT
Start: 2021-03-26 | End: 2021-03-30

## 2021-03-26 RX ORDER — ACETAMINOPHEN 500 MG
1000 TABLET ORAL EVERY 8 HOURS
Status: DISCONTINUED | OUTPATIENT
Start: 2021-03-26 | End: 2021-04-02 | Stop reason: HOSPADM

## 2021-03-26 RX ORDER — CEFAZOLIN SODIUM 1 G/3ML
2 INJECTION, POWDER, FOR SOLUTION INTRAMUSCULAR; INTRAVENOUS
Status: DISCONTINUED | OUTPATIENT
Start: 2021-03-26 | End: 2021-03-26

## 2021-03-26 RX ORDER — POTASSIUM CHLORIDE 14.9 MG/ML
20 INJECTION INTRAVENOUS
Status: DISCONTINUED | OUTPATIENT
Start: 2021-03-26 | End: 2021-03-31

## 2021-03-26 RX ORDER — ASPIRIN 325 MG
325 TABLET ORAL DAILY
Status: DISCONTINUED | OUTPATIENT
Start: 2021-03-27 | End: 2021-03-27

## 2021-03-26 RX ORDER — HYDROMORPHONE HYDROCHLORIDE 1 MG/ML
2 INJECTION, SOLUTION INTRAMUSCULAR; INTRAVENOUS; SUBCUTANEOUS EVERY 4 HOURS PRN
Status: DISCONTINUED | OUTPATIENT
Start: 2021-03-26 | End: 2021-03-29

## 2021-03-26 RX ORDER — ONDANSETRON 2 MG/ML
INJECTION INTRAMUSCULAR; INTRAVENOUS
Status: DISCONTINUED | OUTPATIENT
Start: 2021-03-26 | End: 2021-03-26

## 2021-03-26 RX ORDER — LIDOCAINE HCL/PF 100 MG/5ML
SYRINGE (ML) INTRAVENOUS
Status: DISCONTINUED | OUTPATIENT
Start: 2021-03-26 | End: 2021-03-26

## 2021-03-26 RX ORDER — PROTAMINE SULFATE 10 MG/ML
INJECTION, SOLUTION INTRAVENOUS
Status: DISCONTINUED | OUTPATIENT
Start: 2021-03-26 | End: 2021-03-26

## 2021-03-26 RX ORDER — POTASSIUM CHLORIDE 29.8 MG/ML
40 INJECTION INTRAVENOUS
Status: DISCONTINUED | OUTPATIENT
Start: 2021-03-26 | End: 2021-03-31

## 2021-03-26 RX ORDER — HEPARIN SOD,PORCINE/0.9 % NACL 1000/500ML
INTRAVENOUS SOLUTION INTRAVENOUS
Status: DISCONTINUED | OUTPATIENT
Start: 2021-03-26 | End: 2021-03-26

## 2021-03-26 RX ORDER — KETAMINE HCL IN 0.9 % NACL 50 MG/5 ML
SYRINGE (ML) INTRAVENOUS
Status: DISCONTINUED | OUTPATIENT
Start: 2021-03-26 | End: 2021-03-26

## 2021-03-26 RX ORDER — ALBUMIN HUMAN 50 G/1000ML
25 SOLUTION INTRAVENOUS ONCE
Status: COMPLETED | OUTPATIENT
Start: 2021-03-27 | End: 2021-03-27

## 2021-03-26 RX ORDER — DOCUSATE SODIUM 100 MG/1
100 CAPSULE, LIQUID FILLED ORAL 2 TIMES DAILY
Status: DISCONTINUED | OUTPATIENT
Start: 2021-03-26 | End: 2021-03-28

## 2021-03-26 RX ORDER — SODIUM CHLORIDE 9 MG/ML
INJECTION, SOLUTION INTRAVENOUS CONTINUOUS
Status: DISCONTINUED | OUTPATIENT
Start: 2021-03-26 | End: 2021-03-26

## 2021-03-26 RX ORDER — HEPARIN SODIUM 1000 [USP'U]/ML
INJECTION, SOLUTION INTRAVENOUS; SUBCUTANEOUS
Status: DISCONTINUED | OUTPATIENT
Start: 2021-03-26 | End: 2021-03-26

## 2021-03-26 RX ORDER — MAGNESIUM SULFATE HEPTAHYDRATE 40 MG/ML
2 INJECTION, SOLUTION INTRAVENOUS
Status: DISCONTINUED | OUTPATIENT
Start: 2021-03-26 | End: 2021-03-31

## 2021-03-26 RX ADMIN — FENTANYL CITRATE 200 MCG: 50 INJECTION, SOLUTION INTRAMUSCULAR; INTRAVENOUS at 07:03

## 2021-03-26 RX ADMIN — EPINEPHRINE 0.08 MCG/KG/MIN: 1 INJECTION INTRAMUSCULAR; INTRAVENOUS; SUBCUTANEOUS at 02:03

## 2021-03-26 RX ADMIN — FENTANYL CITRATE 100 MCG: 50 INJECTION, SOLUTION INTRAMUSCULAR; INTRAVENOUS at 08:03

## 2021-03-26 RX ADMIN — NOREPINEPHRINE BITARTRATE 0.02 MG: 1 INJECTION, SOLUTION, CONCENTRATE INTRAVENOUS at 08:03

## 2021-03-26 RX ADMIN — VASOPRESSIN 0.04 UNITS/MIN: 20 INJECTION INTRAVENOUS at 03:03

## 2021-03-26 RX ADMIN — EPINEPHRINE 0.1 MCG/KG/MIN: 1 INJECTION INTRAMUSCULAR; INTRAVENOUS; SUBCUTANEOUS at 09:03

## 2021-03-26 RX ADMIN — Medication 10 MG: at 10:03

## 2021-03-26 RX ADMIN — PROPOFOL 50 MCG/KG/MIN: 10 INJECTION, EMULSION INTRAVENOUS at 06:03

## 2021-03-26 RX ADMIN — LIDOCAINE HYDROCHLORIDE 100 MG: 20 INJECTION, SOLUTION INTRAVENOUS at 02:03

## 2021-03-26 RX ADMIN — Medication 10 MG: at 12:03

## 2021-03-26 RX ADMIN — SODIUM CHLORIDE 1 MG/KG/HR: 9 INJECTION, SOLUTION INTRAVENOUS at 07:03

## 2021-03-26 RX ADMIN — Medication 50 MG: at 07:03

## 2021-03-26 RX ADMIN — FENTANYL CITRATE 100 MCG: 50 INJECTION, SOLUTION INTRAMUSCULAR; INTRAVENOUS at 11:03

## 2021-03-26 RX ADMIN — CALCIUM CHLORIDE 1 G: 100 INJECTION, SOLUTION INTRAVENOUS at 08:03

## 2021-03-26 RX ADMIN — HEPARIN SODIUM 28000 UNITS: 1000 INJECTION, SOLUTION INTRAVENOUS; SUBCUTANEOUS at 09:03

## 2021-03-26 RX ADMIN — PROTAMINE SULFATE 50 MG: 10 INJECTION, SOLUTION INTRAVENOUS at 02:03

## 2021-03-26 RX ADMIN — SODIUM CHLORIDE, SODIUM GLUCONATE, SODIUM ACETATE, POTASSIUM CHLORIDE, MAGNESIUM CHLORIDE, SODIUM PHOSPHATE, DIBASIC, AND POTASSIUM PHOSPHATE: .53; .5; .37; .037; .03; .012; .00082 INJECTION, SOLUTION INTRAVENOUS at 11:03

## 2021-03-26 RX ADMIN — Medication 10 MG: at 11:03

## 2021-03-26 RX ADMIN — EPINEPHRINE 0.08 MCG/KG/MIN: 1 INJECTION INTRAMUSCULAR; INTRAVENOUS; SUBCUTANEOUS at 04:03

## 2021-03-26 RX ADMIN — LIDOCAINE HYDROCHLORIDE 100 MG: 20 INJECTION, SOLUTION EPIDURAL; INFILTRATION; INTRACAUDAL at 02:03

## 2021-03-26 RX ADMIN — Medication 10 MG: at 02:03

## 2021-03-26 RX ADMIN — SODIUM BICARBONATE 50 MEQ: 84 INJECTION, SOLUTION INTRAVENOUS at 11:03

## 2021-03-26 RX ADMIN — VASOPRESSIN 2 UNITS: 20 INJECTION INTRAVENOUS at 10:03

## 2021-03-26 RX ADMIN — ROCURONIUM BROMIDE 30 MG: 10 INJECTION, SOLUTION INTRAVENOUS at 10:03

## 2021-03-26 RX ADMIN — VASOPRESSIN 0.04 UNITS/MIN: 20 INJECTION INTRAVENOUS at 04:03

## 2021-03-26 RX ADMIN — ROCURONIUM BROMIDE 100 MG: 10 INJECTION, SOLUTION INTRAVENOUS at 07:03

## 2021-03-26 RX ADMIN — FENTANYL CITRATE 100 MCG: 50 INJECTION, SOLUTION INTRAMUSCULAR; INTRAVENOUS at 10:03

## 2021-03-26 RX ADMIN — SODIUM CHLORIDE: 0.9 INJECTION, SOLUTION INTRAVENOUS at 06:03

## 2021-03-26 RX ADMIN — CALCIUM CHLORIDE 0.5 G: 100 INJECTION, SOLUTION INTRAVENOUS at 03:03

## 2021-03-26 RX ADMIN — NEOSTIGMINE METHYLSULFATE 5 MG: 0.5 INJECTION INTRAVENOUS at 04:03

## 2021-03-26 RX ADMIN — SODIUM CHLORIDE, SODIUM GLUCONATE, SODIUM ACETATE, POTASSIUM CHLORIDE, MAGNESIUM CHLORIDE, SODIUM PHOSPHATE, DIBASIC, AND POTASSIUM PHOSPHATE: .53; .5; .37; .037; .03; .012; .00082 INJECTION, SOLUTION INTRAVENOUS at 04:03

## 2021-03-26 RX ADMIN — CEFAZOLIN 2 G: 330 INJECTION, POWDER, FOR SOLUTION INTRAMUSCULAR; INTRAVENOUS at 08:03

## 2021-03-26 RX ADMIN — PROTAMINE SULFATE 25 MG: 10 INJECTION, SOLUTION INTRAVENOUS at 02:03

## 2021-03-26 RX ADMIN — Medication 10 MG: at 09:03

## 2021-03-26 RX ADMIN — PROTAMINE SULFATE 5 MG: 10 INJECTION, SOLUTION INTRAVENOUS at 02:03

## 2021-03-26 RX ADMIN — FENTANYL CITRATE 100 MCG: 50 INJECTION, SOLUTION INTRAMUSCULAR; INTRAVENOUS at 02:03

## 2021-03-26 RX ADMIN — DEXMEDETOMIDINE HYDROCHLORIDE 0.8 MCG/KG/HR: 100 INJECTION, SOLUTION INTRAVENOUS at 11:03

## 2021-03-26 RX ADMIN — GLYCOPYRROLATE 0.6 MG: 0.2 INJECTION, SOLUTION INTRAMUSCULAR; INTRAVITREAL at 04:03

## 2021-03-26 RX ADMIN — SODIUM BICARBONATE 50 MEQ: 84 INJECTION, SOLUTION INTRAVENOUS at 06:03

## 2021-03-26 RX ADMIN — ROCURONIUM BROMIDE 30 MG: 10 INJECTION, SOLUTION INTRAVENOUS at 01:03

## 2021-03-26 RX ADMIN — MILRINONE LACTATE IN DEXTROSE 0.38 MCG/KG/MIN: 200 INJECTION, SOLUTION INTRAVENOUS at 04:03

## 2021-03-26 RX ADMIN — SODIUM CHLORIDE, SODIUM GLUCONATE, SODIUM ACETATE, POTASSIUM CHLORIDE, MAGNESIUM CHLORIDE, SODIUM PHOSPHATE, DIBASIC, AND POTASSIUM PHOSPHATE: .53; .5; .37; .037; .03; .012; .00082 INJECTION, SOLUTION INTRAVENOUS at 07:03

## 2021-03-26 RX ADMIN — NITROGLYCERIN 50 MCG: 5 INJECTION, SOLUTION INTRAVENOUS at 03:03

## 2021-03-26 RX ADMIN — LIDOCAINE HYDROCHLORIDE 100 MG: 20 INJECTION, SOLUTION INTRAVENOUS at 07:03

## 2021-03-26 RX ADMIN — ALBUMIN (HUMAN) 25 G: 12.5 SOLUTION INTRAVENOUS at 08:03

## 2021-03-26 RX ADMIN — HEPARIN SODIUM 10000 UNITS: 1000 INJECTION, SOLUTION INTRAVENOUS; SUBCUTANEOUS at 10:03

## 2021-03-26 RX ADMIN — CALCIUM GLUCONATE 1000 MG: 98 INJECTION, SOLUTION INTRAVENOUS at 06:03

## 2021-03-26 RX ADMIN — PROTAMINE SULFATE 50 MG: 10 INJECTION, SOLUTION INTRAVENOUS at 05:03

## 2021-03-26 RX ADMIN — ROCURONIUM BROMIDE 100 MG: 10 INJECTION, SOLUTION INTRAVENOUS at 08:03

## 2021-03-26 RX ADMIN — PROPOFOL 50 MCG/KG/MIN: 10 INJECTION, EMULSION INTRAVENOUS at 10:03

## 2021-03-26 RX ADMIN — FENTANYL CITRATE 100 MCG: 50 INJECTION, SOLUTION INTRAMUSCULAR; INTRAVENOUS at 01:03

## 2021-03-26 RX ADMIN — FENTANYL CITRATE 200 MCG: 50 INJECTION, SOLUTION INTRAMUSCULAR; INTRAVENOUS at 09:03

## 2021-03-26 RX ADMIN — FENTANYL CITRATE 100 MCG: 50 INJECTION, SOLUTION INTRAMUSCULAR; INTRAVENOUS at 03:03

## 2021-03-26 RX ADMIN — ROCURONIUM BROMIDE 20 MG: 10 INJECTION, SOLUTION INTRAVENOUS at 09:03

## 2021-03-26 RX ADMIN — MUPIROCIN: 20 OINTMENT TOPICAL at 09:03

## 2021-03-26 RX ADMIN — SODIUM CHLORIDE, SODIUM GLUCONATE, SODIUM ACETATE, POTASSIUM CHLORIDE, MAGNESIUM CHLORIDE, SODIUM PHOSPHATE, DIBASIC, AND POTASSIUM PHOSPHATE: .53; .5; .37; .037; .03; .012; .00082 INJECTION, SOLUTION INTRAVENOUS at 06:03

## 2021-03-26 RX ADMIN — CEFAZOLIN 2 G: 330 INJECTION, POWDER, FOR SOLUTION INTRAMUSCULAR; INTRAVENOUS at 12:03

## 2021-03-26 RX ADMIN — TRANEXAMIC ACID 1000 MG: 1 INJECTION, SOLUTION INTRAVENOUS at 08:03

## 2021-03-26 RX ADMIN — ACETAMINOPHEN 1000 MG: 500 TABLET ORAL at 10:03

## 2021-03-26 RX ADMIN — SODIUM CHLORIDE 3 UNITS/HR: 9 INJECTION, SOLUTION INTRAVENOUS at 01:03

## 2021-03-26 RX ADMIN — PROPOFOL 100 MG: 10 INJECTION, EMULSION INTRAVENOUS at 07:03

## 2021-03-26 RX ADMIN — ROCURONIUM BROMIDE 30 MG: 10 INJECTION, SOLUTION INTRAVENOUS at 12:03

## 2021-03-26 RX ADMIN — NOREPINEPHRINE BITARTRATE 0.04 MCG/KG/MIN: 1 INJECTION, SOLUTION, CONCENTRATE INTRAVENOUS at 07:03

## 2021-03-26 RX ADMIN — POTASSIUM CHLORIDE 40 MEQ: 200 INJECTION, SOLUTION INTRAVENOUS at 08:03

## 2021-03-26 RX ADMIN — FENTANYL CITRATE 100 MCG: 50 INJECTION, SOLUTION INTRAMUSCULAR; INTRAVENOUS at 04:03

## 2021-03-26 RX ADMIN — POTASSIUM PHOSPHATE, MONOBASIC AND POTASSIUM PHOSPHATE, DIBASIC 20 MMOL: 224; 236 INJECTION, SOLUTION, CONCENTRATE INTRAVENOUS at 10:03

## 2021-03-26 RX ADMIN — SODIUM BICARBONATE 50 MEQ: 84 INJECTION, SOLUTION INTRAVENOUS at 07:03

## 2021-03-26 RX ADMIN — SODIUM CHLORIDE, SODIUM GLUCONATE, SODIUM ACETATE, POTASSIUM CHLORIDE, MAGNESIUM CHLORIDE, SODIUM PHOSPHATE, DIBASIC, AND POTASSIUM PHOSPHATE: .53; .5; .37; .037; .03; .012; .00082 INJECTION, SOLUTION INTRAVENOUS at 10:03

## 2021-03-26 RX ADMIN — ALBUMIN (HUMAN) 50 G: 12.5 SOLUTION INTRAVENOUS at 05:03

## 2021-03-26 RX ADMIN — SODIUM CHLORIDE, SODIUM GLUCONATE, SODIUM ACETATE, POTASSIUM CHLORIDE, MAGNESIUM CHLORIDE, SODIUM PHOSPHATE, DIBASIC, AND POTASSIUM PHOSPHATE: .53; .5; .37; .037; .03; .012; .00082 INJECTION, SOLUTION INTRAVENOUS at 09:03

## 2021-03-26 RX ADMIN — PHENYLEPHRINE HYDROCHLORIDE 100 MCG: 10 INJECTION INTRAVENOUS at 08:03

## 2021-03-26 RX ADMIN — ROCURONIUM BROMIDE 50 MG: 10 INJECTION, SOLUTION INTRAVENOUS at 12:03

## 2021-03-26 RX ADMIN — NITROGLYCERIN 50 MCG: 5 INJECTION, SOLUTION INTRAVENOUS at 09:03

## 2021-03-26 RX ADMIN — PROPOFOL 50 MCG/KG/MIN: 10 INJECTION, EMULSION INTRAVENOUS at 07:03

## 2021-03-26 RX ADMIN — MIDAZOLAM HYDROCHLORIDE 4 MG: 1 INJECTION, SOLUTION INTRAMUSCULAR; INTRAVENOUS at 04:03

## 2021-03-27 LAB
ABO + RH BLD: NORMAL
ALBUMIN SERPL BCP-MCNC: 3.6 G/DL (ref 3.5–5.2)
ALBUMIN SERPL BCP-MCNC: 3.7 G/DL (ref 3.5–5.2)
ALLENS TEST: ABNORMAL
ALP SERPL-CCNC: 36 U/L (ref 55–135)
ALP SERPL-CCNC: 48 U/L (ref 55–135)
ALT SERPL W/O P-5'-P-CCNC: 11 U/L (ref 10–44)
ALT SERPL W/O P-5'-P-CCNC: 13 U/L (ref 10–44)
ANION GAP SERPL CALC-SCNC: 10 MMOL/L (ref 8–16)
ANION GAP SERPL CALC-SCNC: 14 MMOL/L (ref 8–16)
ANION GAP SERPL CALC-SCNC: 15 MMOL/L (ref 8–16)
ANISOCYTOSIS BLD QL SMEAR: SLIGHT
ANISOCYTOSIS BLD QL SMEAR: SLIGHT
APTT BLDCRRT: 28.4 SEC (ref 21–32)
APTT BLDCRRT: 35.5 SEC (ref 21–32)
AST SERPL-CCNC: 65 U/L (ref 10–40)
AST SERPL-CCNC: 70 U/L (ref 10–40)
BASO STIPL BLD QL SMEAR: ABNORMAL
BASOPHILS # BLD AUTO: 0.03 K/UL (ref 0–0.2)
BASOPHILS # BLD AUTO: 0.05 K/UL (ref 0–0.2)
BASOPHILS # BLD AUTO: 0.05 K/UL (ref 0–0.2)
BASOPHILS NFR BLD: 0.6 % (ref 0–1.9)
BASOPHILS NFR BLD: 0.8 % (ref 0–1.9)
BASOPHILS NFR BLD: 0.9 % (ref 0–1.9)
BILIRUB SERPL-MCNC: 0.9 MG/DL (ref 0.1–1)
BILIRUB SERPL-MCNC: 1.1 MG/DL (ref 0.1–1)
BLD GP AB SCN CELLS X3 SERPL QL: NORMAL
BLD PROD TYP BPU: NORMAL
BLOOD UNIT EXPIRATION DATE: NORMAL
BLOOD UNIT TYPE CODE: 1700
BLOOD UNIT TYPE CODE: 5100
BLOOD UNIT TYPE CODE: 600
BLOOD UNIT TYPE CODE: 8400
BLOOD UNIT TYPE CODE: 8400
BLOOD UNIT TYPE CODE: 9500
BLOOD UNIT TYPE: NORMAL
BUN SERPL-MCNC: 22 MG/DL (ref 8–23)
BUN SERPL-MCNC: 23 MG/DL (ref 8–23)
BUN SERPL-MCNC: 25 MG/DL (ref 8–23)
BURR CELLS BLD QL SMEAR: ABNORMAL
CALCIUM SERPL-MCNC: 8 MG/DL (ref 8.7–10.5)
CHLORIDE SERPL-SCNC: 111 MMOL/L (ref 95–110)
CHLORIDE SERPL-SCNC: 111 MMOL/L (ref 95–110)
CHLORIDE SERPL-SCNC: 113 MMOL/L (ref 95–110)
CO2 SERPL-SCNC: 19 MMOL/L (ref 23–29)
CO2 SERPL-SCNC: 20 MMOL/L (ref 23–29)
CO2 SERPL-SCNC: 25 MMOL/L (ref 23–29)
CODING SYSTEM: NORMAL
CREAT SERPL-MCNC: 1.8 MG/DL (ref 0.5–1.4)
CREAT SERPL-MCNC: 1.8 MG/DL (ref 0.5–1.4)
CREAT SERPL-MCNC: 2 MG/DL (ref 0.5–1.4)
DELSYS: ABNORMAL
DIFFERENTIAL METHOD: ABNORMAL
DISPENSE STATUS: NORMAL
EOSINOPHIL # BLD AUTO: 0.1 K/UL (ref 0–0.5)
EOSINOPHIL # BLD AUTO: 0.1 K/UL (ref 0–0.5)
EOSINOPHIL # BLD AUTO: 0.2 K/UL (ref 0–0.5)
EOSINOPHIL NFR BLD: 1 % (ref 0–8)
EOSINOPHIL NFR BLD: 1.9 % (ref 0–8)
EOSINOPHIL NFR BLD: 2.9 % (ref 0–8)
ERYTHROCYTE [DISTWIDTH] IN BLOOD BY AUTOMATED COUNT: 15.8 % (ref 11.5–14.5)
ERYTHROCYTE [DISTWIDTH] IN BLOOD BY AUTOMATED COUNT: 16 % (ref 11.5–14.5)
ERYTHROCYTE [DISTWIDTH] IN BLOOD BY AUTOMATED COUNT: 16.1 % (ref 11.5–14.5)
ERYTHROCYTE [SEDIMENTATION RATE] IN BLOOD BY WESTERGREN METHOD: 22 MM/H
EST. GFR  (AFRICAN AMERICAN): 37.2 ML/MIN/1.73 M^2
EST. GFR  (AFRICAN AMERICAN): 42.2 ML/MIN/1.73 M^2
EST. GFR  (AFRICAN AMERICAN): 42.2 ML/MIN/1.73 M^2
EST. GFR  (NON AFRICAN AMERICAN): 32.2 ML/MIN/1.73 M^2
EST. GFR  (NON AFRICAN AMERICAN): 36.5 ML/MIN/1.73 M^2
EST. GFR  (NON AFRICAN AMERICAN): 36.5 ML/MIN/1.73 M^2
FIBRINOGEN PPP-MCNC: 295 MG/DL (ref 182–366)
FIBRINOGEN PPP-MCNC: 393 MG/DL (ref 182–366)
FIO2: 40
FIO2: 50
GIANT PLATELETS BLD QL SMEAR: PRESENT
GLUCOSE SERPL-MCNC: 136 MG/DL (ref 70–110)
GLUCOSE SERPL-MCNC: 168 MG/DL (ref 70–110)
GLUCOSE SERPL-MCNC: 206 MG/DL (ref 70–110)
HCO3 UR-SCNC: 20.3 MMOL/L (ref 24–28)
HCO3 UR-SCNC: 20.7 MMOL/L (ref 24–28)
HCO3 UR-SCNC: 21 MMOL/L (ref 24–28)
HCO3 UR-SCNC: 21.6 MMOL/L (ref 24–28)
HCO3 UR-SCNC: 22 MMOL/L (ref 24–28)
HCO3 UR-SCNC: 23.9 MMOL/L (ref 24–28)
HCO3 UR-SCNC: 24.4 MMOL/L (ref 24–28)
HCO3 UR-SCNC: 24.5 MMOL/L (ref 24–28)
HCO3 UR-SCNC: 24.8 MMOL/L (ref 24–28)
HCO3 UR-SCNC: 25.7 MMOL/L (ref 24–28)
HCT VFR BLD AUTO: 23.5 % (ref 40–54)
HCT VFR BLD AUTO: 28.2 % (ref 40–54)
HCT VFR BLD AUTO: 29.7 % (ref 40–54)
HCT VFR BLD CALC: 20 %PCV (ref 36–54)
HCT VFR BLD CALC: 21 %PCV (ref 36–54)
HCT VFR BLD CALC: 23 %PCV (ref 36–54)
HCT VFR BLD CALC: 24 %PCV (ref 36–54)
HCT VFR BLD CALC: 26 %PCV (ref 36–54)
HCT VFR BLD CALC: 27 %PCV (ref 36–54)
HCT VFR BLD CALC: 28 %PCV (ref 36–54)
HCT VFR BLD CALC: 28 %PCV (ref 36–54)
HGB BLD-MCNC: 7.8 G/DL (ref 14–18)
HGB BLD-MCNC: 9.5 G/DL (ref 14–18)
HGB BLD-MCNC: 9.6 G/DL (ref 14–18)
IMM GRANULOCYTES # BLD AUTO: 0.02 K/UL (ref 0–0.04)
IMM GRANULOCYTES # BLD AUTO: 0.02 K/UL (ref 0–0.04)
IMM GRANULOCYTES # BLD AUTO: 0.03 K/UL (ref 0–0.04)
IMM GRANULOCYTES NFR BLD AUTO: 0.3 % (ref 0–0.5)
IMM GRANULOCYTES NFR BLD AUTO: 0.4 % (ref 0–0.5)
IMM GRANULOCYTES NFR BLD AUTO: 0.5 % (ref 0–0.5)
INR PPP: 1 (ref 0.8–1.2)
INR PPP: 1.2 (ref 0.8–1.2)
LDH SERPL L TO P-CCNC: 1.82 MMOL/L (ref 0.36–1.25)
LDH SERPL L TO P-CCNC: 2.61 MMOL/L (ref 0.36–1.25)
LDH SERPL L TO P-CCNC: 2.92 MMOL/L (ref 0.36–1.25)
LDH SERPL L TO P-CCNC: 3.37 MMOL/L (ref 0.36–1.25)
LDH SERPL L TO P-CCNC: 4.02 MMOL/L (ref 0.36–1.25)
LDH SERPL L TO P-CCNC: 4.41 MMOL/L (ref 0.36–1.25)
LDH SERPL L TO P-CCNC: 4.69 MMOL/L (ref 0.36–1.25)
LDH SERPL L TO P-CCNC: 5.1 MMOL/L (ref 0.36–1.25)
LDH SERPL L TO P-CCNC: 5.7 MMOL/L (ref 0.36–1.25)
LDH SERPL L TO P-CCNC: 6.65 MMOL/L (ref 0.36–1.25)
LYMPHOCYTES # BLD AUTO: 0.5 K/UL (ref 1–4.8)
LYMPHOCYTES # BLD AUTO: 0.7 K/UL (ref 1–4.8)
LYMPHOCYTES # BLD AUTO: 0.7 K/UL (ref 1–4.8)
LYMPHOCYTES NFR BLD: 10.1 % (ref 18–48)
LYMPHOCYTES NFR BLD: 10.5 % (ref 18–48)
LYMPHOCYTES NFR BLD: 11.5 % (ref 18–48)
MAGNESIUM SERPL-MCNC: 2.7 MG/DL (ref 1.6–2.6)
MAGNESIUM SERPL-MCNC: 2.7 MG/DL (ref 1.6–2.6)
MCH RBC QN AUTO: 27.5 PG (ref 27–31)
MCH RBC QN AUTO: 27.6 PG (ref 27–31)
MCH RBC QN AUTO: 28.2 PG (ref 27–31)
MCHC RBC AUTO-ENTMCNC: 32.3 G/DL (ref 32–36)
MCHC RBC AUTO-ENTMCNC: 33.2 G/DL (ref 32–36)
MCHC RBC AUTO-ENTMCNC: 33.7 G/DL (ref 32–36)
MCV RBC AUTO: 83 FL (ref 82–98)
MCV RBC AUTO: 84 FL (ref 82–98)
MCV RBC AUTO: 85 FL (ref 82–98)
MODE: ABNORMAL
MONOCYTES # BLD AUTO: 0.5 K/UL (ref 0.3–1)
MONOCYTES # BLD AUTO: 0.5 K/UL (ref 0.3–1)
MONOCYTES # BLD AUTO: 0.6 K/UL (ref 0.3–1)
MONOCYTES NFR BLD: 10.8 % (ref 4–15)
MONOCYTES NFR BLD: 7.7 % (ref 4–15)
MONOCYTES NFR BLD: 9.8 % (ref 4–15)
NEUTROPHILS # BLD AUTO: 4 K/UL (ref 1.8–7.7)
NEUTROPHILS # BLD AUTO: 4.3 K/UL (ref 1.8–7.7)
NEUTROPHILS # BLD AUTO: 5 K/UL (ref 1.8–7.7)
NEUTROPHILS NFR BLD: 74.6 % (ref 38–73)
NEUTROPHILS NFR BLD: 77.7 % (ref 38–73)
NEUTROPHILS NFR BLD: 78 % (ref 38–73)
NRBC BLD-RTO: 0 /100 WBC
NUM UNITS TRANS FFP: NORMAL
NUM UNITS TRANS PACKED RBC: NORMAL
NUM UNITS TRANS WBC-POOR PLATPHERESIS: NORMAL
OVALOCYTES BLD QL SMEAR: ABNORMAL
PCO2 BLDA: 33 MMHG (ref 35–45)
PCO2 BLDA: 34.1 MMHG (ref 35–45)
PCO2 BLDA: 35.9 MMHG (ref 35–45)
PCO2 BLDA: 37 MMHG (ref 35–45)
PCO2 BLDA: 37.1 MMHG (ref 35–45)
PCO2 BLDA: 37.5 MMHG (ref 35–45)
PCO2 BLDA: 37.6 MMHG (ref 35–45)
PCO2 BLDA: 38.7 MMHG (ref 35–45)
PCO2 BLDA: 38.8 MMHG (ref 35–45)
PCO2 BLDA: 43.6 MMHG (ref 35–45)
PEEP: 5
PEEP: 8
PH SMN: 7.34 [PH] (ref 7.35–7.45)
PH SMN: 7.37 [PH] (ref 7.35–7.45)
PH SMN: 7.38 [PH] (ref 7.35–7.45)
PH SMN: 7.38 [PH] (ref 7.35–7.45)
PH SMN: 7.39 [PH] (ref 7.35–7.45)
PH SMN: 7.4 [PH] (ref 7.35–7.45)
PH SMN: 7.4 [PH] (ref 7.35–7.45)
PH SMN: 7.43 [PH] (ref 7.35–7.45)
PH SMN: 7.43 [PH] (ref 7.35–7.45)
PH SMN: 7.44 [PH] (ref 7.35–7.45)
PHOSPHATE SERPL-MCNC: 3.1 MG/DL (ref 2.7–4.5)
PHOSPHATE SERPL-MCNC: 4.4 MG/DL (ref 2.7–4.5)
PLATELET # BLD AUTO: 102 K/UL (ref 150–350)
PLATELET # BLD AUTO: 108 K/UL (ref 150–350)
PLATELET # BLD AUTO: 125 K/UL (ref 150–350)
PLATELET BLD QL SMEAR: ABNORMAL
PLATELET BLD QL SMEAR: ABNORMAL
PMV BLD AUTO: 10.5 FL (ref 9.2–12.9)
PMV BLD AUTO: 10.5 FL (ref 9.2–12.9)
PMV BLD AUTO: 10.6 FL (ref 9.2–12.9)
PO2 BLDA: 110 MMHG (ref 80–100)
PO2 BLDA: 65 MMHG (ref 80–100)
PO2 BLDA: 73 MMHG (ref 80–100)
PO2 BLDA: 75 MMHG (ref 80–100)
PO2 BLDA: 80 MMHG (ref 80–100)
PO2 BLDA: 81 MMHG (ref 80–100)
PO2 BLDA: 83 MMHG (ref 80–100)
PO2 BLDA: 83 MMHG (ref 80–100)
PO2 BLDA: 86 MMHG (ref 80–100)
PO2 BLDA: 87 MMHG (ref 80–100)
POC BE: -1 MMOL/L
POC BE: -3 MMOL/L
POC BE: -4 MMOL/L
POC BE: -5 MMOL/L
POC BE: 0 MMOL/L
POC BE: 1 MMOL/L
POC IONIZED CALCIUM: 1.06 MMOL/L (ref 1.06–1.42)
POC IONIZED CALCIUM: 1.07 MMOL/L (ref 1.06–1.42)
POC IONIZED CALCIUM: 1.07 MMOL/L (ref 1.06–1.42)
POC IONIZED CALCIUM: 1.1 MMOL/L (ref 1.06–1.42)
POC IONIZED CALCIUM: 1.12 MMOL/L (ref 1.06–1.42)
POC SATURATED O2: 92 % (ref 95–100)
POC SATURATED O2: 94 % (ref 95–100)
POC SATURATED O2: 95 % (ref 95–100)
POC SATURATED O2: 96 % (ref 95–100)
POC SATURATED O2: 97 % (ref 95–100)
POC SATURATED O2: 97 % (ref 95–100)
POC SATURATED O2: 98 % (ref 95–100)
POC TCO2: 21 MMOL/L (ref 23–27)
POC TCO2: 22 MMOL/L (ref 23–27)
POC TCO2: 22 MMOL/L (ref 23–27)
POC TCO2: 23 MMOL/L (ref 23–27)
POC TCO2: 23 MMOL/L (ref 23–27)
POC TCO2: 25 MMOL/L (ref 23–27)
POC TCO2: 25 MMOL/L (ref 23–27)
POC TCO2: 26 MMOL/L (ref 23–27)
POC TCO2: 26 MMOL/L (ref 23–27)
POC TCO2: 27 MMOL/L (ref 23–27)
POCT GLUCOSE: 111 MG/DL (ref 70–110)
POCT GLUCOSE: 113 MG/DL (ref 70–110)
POCT GLUCOSE: 114 MG/DL (ref 70–110)
POCT GLUCOSE: 119 MG/DL (ref 70–110)
POCT GLUCOSE: 120 MG/DL (ref 70–110)
POCT GLUCOSE: 129 MG/DL (ref 70–110)
POCT GLUCOSE: 131 MG/DL (ref 70–110)
POCT GLUCOSE: 138 MG/DL (ref 70–110)
POCT GLUCOSE: 147 MG/DL (ref 70–110)
POCT GLUCOSE: 150 MG/DL (ref 70–110)
POCT GLUCOSE: 151 MG/DL (ref 70–110)
POCT GLUCOSE: 156 MG/DL (ref 70–110)
POCT GLUCOSE: 168 MG/DL (ref 70–110)
POCT GLUCOSE: 170 MG/DL (ref 70–110)
POCT GLUCOSE: 176 MG/DL (ref 70–110)
POCT GLUCOSE: 181 MG/DL (ref 70–110)
POCT GLUCOSE: 194 MG/DL (ref 70–110)
POCT GLUCOSE: 198 MG/DL (ref 70–110)
POCT GLUCOSE: 211 MG/DL (ref 70–110)
POCT GLUCOSE: 211 MG/DL (ref 70–110)
POIKILOCYTOSIS BLD QL SMEAR: SLIGHT
POTASSIUM BLD-SCNC: 3.7 MMOL/L (ref 3.5–5.1)
POTASSIUM BLD-SCNC: 3.8 MMOL/L (ref 3.5–5.1)
POTASSIUM BLD-SCNC: 3.8 MMOL/L (ref 3.5–5.1)
POTASSIUM BLD-SCNC: 3.9 MMOL/L (ref 3.5–5.1)
POTASSIUM BLD-SCNC: 3.9 MMOL/L (ref 3.5–5.1)
POTASSIUM BLD-SCNC: 4.1 MMOL/L (ref 3.5–5.1)
POTASSIUM BLD-SCNC: 4.1 MMOL/L (ref 3.5–5.1)
POTASSIUM BLD-SCNC: 4.3 MMOL/L (ref 3.5–5.1)
POTASSIUM BLD-SCNC: 4.4 MMOL/L (ref 3.5–5.1)
POTASSIUM BLD-SCNC: 4.4 MMOL/L (ref 3.5–5.1)
POTASSIUM SERPL-SCNC: 3.9 MMOL/L (ref 3.5–5.1)
POTASSIUM SERPL-SCNC: 4.1 MMOL/L (ref 3.5–5.1)
POTASSIUM SERPL-SCNC: 4.4 MMOL/L (ref 3.5–5.1)
PROT SERPL-MCNC: 5.1 G/DL (ref 6–8.4)
PROT SERPL-MCNC: 5.7 G/DL (ref 6–8.4)
PROTHROMBIN TIME: 11.4 SEC (ref 9–12.5)
PROTHROMBIN TIME: 12.6 SEC (ref 9–12.5)
RBC # BLD AUTO: 2.84 M/UL (ref 4.6–6.2)
RBC # BLD AUTO: 3.37 M/UL (ref 4.6–6.2)
RBC # BLD AUTO: 3.48 M/UL (ref 4.6–6.2)
SAMPLE: ABNORMAL
SCHISTOCYTES BLD QL SMEAR: ABNORMAL
SITE: ABNORMAL
SODIUM BLD-SCNC: 146 MMOL/L (ref 136–145)
SODIUM BLD-SCNC: 147 MMOL/L (ref 136–145)
SODIUM BLD-SCNC: 148 MMOL/L (ref 136–145)
SODIUM SERPL-SCNC: 146 MMOL/L (ref 136–145)
TRANS PLATPHERESIS VOL PATIENT: NORMAL ML
UNIT NUMBER: NORMAL
VT: 460
WBC # BLD AUTO: 5.12 K/UL (ref 3.9–12.7)
WBC # BLD AUTO: 5.76 K/UL (ref 3.9–12.7)
WBC # BLD AUTO: 6.46 K/UL (ref 3.9–12.7)
WBC TOXIC VACUOLES BLD QL SMEAR: PRESENT

## 2021-03-27 PROCEDURE — 85384 FIBRINOGEN ACTIVITY: CPT | Performed by: STUDENT IN AN ORGANIZED HEALTH CARE EDUCATION/TRAINING PROGRAM

## 2021-03-27 PROCEDURE — 82803 BLOOD GASES ANY COMBINATION: CPT

## 2021-03-27 PROCEDURE — 82330 ASSAY OF CALCIUM: CPT

## 2021-03-27 PROCEDURE — 84100 ASSAY OF PHOSPHORUS: CPT | Performed by: STUDENT IN AN ORGANIZED HEALTH CARE EDUCATION/TRAINING PROGRAM

## 2021-03-27 PROCEDURE — 63600175 PHARM REV CODE 636 W HCPCS: Performed by: THORACIC SURGERY (CARDIOTHORACIC VASCULAR SURGERY)

## 2021-03-27 PROCEDURE — 83735 ASSAY OF MAGNESIUM: CPT | Performed by: STUDENT IN AN ORGANIZED HEALTH CARE EDUCATION/TRAINING PROGRAM

## 2021-03-27 PROCEDURE — 85025 COMPLETE CBC W/AUTO DIFF WBC: CPT | Performed by: STUDENT IN AN ORGANIZED HEALTH CARE EDUCATION/TRAINING PROGRAM

## 2021-03-27 PROCEDURE — 99900026 HC AIRWAY MAINTENANCE (STAT)

## 2021-03-27 PROCEDURE — P9045 ALBUMIN (HUMAN), 5%, 250 ML: HCPCS | Mod: JG | Performed by: STUDENT IN AN ORGANIZED HEALTH CARE EDUCATION/TRAINING PROGRAM

## 2021-03-27 PROCEDURE — 25000003 PHARM REV CODE 250: Performed by: STUDENT IN AN ORGANIZED HEALTH CARE EDUCATION/TRAINING PROGRAM

## 2021-03-27 PROCEDURE — P9037 PLATE PHERES LEUKOREDU IRRAD: HCPCS | Performed by: STUDENT IN AN ORGANIZED HEALTH CARE EDUCATION/TRAINING PROGRAM

## 2021-03-27 PROCEDURE — P9035 PLATELET PHERES LEUKOREDUCED: HCPCS | Performed by: STUDENT IN AN ORGANIZED HEALTH CARE EDUCATION/TRAINING PROGRAM

## 2021-03-27 PROCEDURE — 99291 CRITICAL CARE FIRST HOUR: CPT | Mod: ,,, | Performed by: ANESTHESIOLOGY

## 2021-03-27 PROCEDURE — P9016 RBC LEUKOCYTES REDUCED: HCPCS | Performed by: STUDENT IN AN ORGANIZED HEALTH CARE EDUCATION/TRAINING PROGRAM

## 2021-03-27 PROCEDURE — P9012 CRYOPRECIPITATE EACH UNIT: HCPCS | Performed by: STUDENT IN AN ORGANIZED HEALTH CARE EDUCATION/TRAINING PROGRAM

## 2021-03-27 PROCEDURE — 84295 ASSAY OF SERUM SODIUM: CPT

## 2021-03-27 PROCEDURE — 63600175 PHARM REV CODE 636 W HCPCS: Performed by: STUDENT IN AN ORGANIZED HEALTH CARE EDUCATION/TRAINING PROGRAM

## 2021-03-27 PROCEDURE — P9017 PLASMA 1 DONOR FRZ W/IN 8 HR: HCPCS | Performed by: STUDENT IN AN ORGANIZED HEALTH CARE EDUCATION/TRAINING PROGRAM

## 2021-03-27 PROCEDURE — 85730 THROMBOPLASTIN TIME PARTIAL: CPT | Mod: 91 | Performed by: STUDENT IN AN ORGANIZED HEALTH CARE EDUCATION/TRAINING PROGRAM

## 2021-03-27 PROCEDURE — 27000221 HC OXYGEN, UP TO 24 HOURS

## 2021-03-27 PROCEDURE — 83605 ASSAY OF LACTIC ACID: CPT

## 2021-03-27 PROCEDURE — 20000000 HC ICU ROOM

## 2021-03-27 PROCEDURE — 94761 N-INVAS EAR/PLS OXIMETRY MLT: CPT

## 2021-03-27 PROCEDURE — 80048 BASIC METABOLIC PNL TOTAL CA: CPT | Performed by: THORACIC SURGERY (CARDIOTHORACIC VASCULAR SURGERY)

## 2021-03-27 PROCEDURE — 86900 BLOOD TYPING SEROLOGIC ABO: CPT | Performed by: STUDENT IN AN ORGANIZED HEALTH CARE EDUCATION/TRAINING PROGRAM

## 2021-03-27 PROCEDURE — 99291 PR CRITICAL CARE, E/M 30-74 MINUTES: ICD-10-PCS | Mod: ,,, | Performed by: ANESTHESIOLOGY

## 2021-03-27 PROCEDURE — 86920 COMPATIBILITY TEST SPIN: CPT | Performed by: STUDENT IN AN ORGANIZED HEALTH CARE EDUCATION/TRAINING PROGRAM

## 2021-03-27 PROCEDURE — 36430 TRANSFUSION BLD/BLD COMPNT: CPT

## 2021-03-27 PROCEDURE — 85014 HEMATOCRIT: CPT

## 2021-03-27 PROCEDURE — 85025 COMPLETE CBC W/AUTO DIFF WBC: CPT | Mod: 91 | Performed by: THORACIC SURGERY (CARDIOTHORACIC VASCULAR SURGERY)

## 2021-03-27 PROCEDURE — C9248 INJ, CLEVIDIPINE BUTYRATE: HCPCS | Performed by: STUDENT IN AN ORGANIZED HEALTH CARE EDUCATION/TRAINING PROGRAM

## 2021-03-27 PROCEDURE — 84132 ASSAY OF SERUM POTASSIUM: CPT

## 2021-03-27 PROCEDURE — 86965 POOLING BLOOD PLATELETS: CPT | Performed by: STUDENT IN AN ORGANIZED HEALTH CARE EDUCATION/TRAINING PROGRAM

## 2021-03-27 PROCEDURE — 37799 UNLISTED PX VASCULAR SURGERY: CPT

## 2021-03-27 PROCEDURE — 85610 PROTHROMBIN TIME: CPT | Mod: 91 | Performed by: STUDENT IN AN ORGANIZED HEALTH CARE EDUCATION/TRAINING PROGRAM

## 2021-03-27 PROCEDURE — 80053 COMPREHEN METABOLIC PANEL: CPT | Mod: 91 | Performed by: STUDENT IN AN ORGANIZED HEALTH CARE EDUCATION/TRAINING PROGRAM

## 2021-03-27 PROCEDURE — 94003 VENT MGMT INPAT SUBQ DAY: CPT

## 2021-03-27 PROCEDURE — C9248 INJ, CLEVIDIPINE BUTYRATE: HCPCS | Performed by: THORACIC SURGERY (CARDIOTHORACIC VASCULAR SURGERY)

## 2021-03-27 PROCEDURE — 99900035 HC TECH TIME PER 15 MIN (STAT)

## 2021-03-27 RX ORDER — HYDROCODONE BITARTRATE AND ACETAMINOPHEN 500; 5 MG/1; MG/1
TABLET ORAL
Status: DISCONTINUED | OUTPATIENT
Start: 2021-03-27 | End: 2021-03-29

## 2021-03-27 RX ORDER — DEXMEDETOMIDINE HYDROCHLORIDE 4 UG/ML
0-1.4 INJECTION, SOLUTION INTRAVENOUS CONTINUOUS
Status: DISCONTINUED | OUTPATIENT
Start: 2021-03-27 | End: 2021-03-29

## 2021-03-27 RX ORDER — ATORVASTATIN CALCIUM 20 MG/1
40 TABLET, FILM COATED ORAL NIGHTLY
Status: DISCONTINUED | OUTPATIENT
Start: 2021-03-27 | End: 2021-04-02 | Stop reason: HOSPADM

## 2021-03-27 RX ORDER — ASPIRIN 325 MG
325 TABLET ORAL DAILY
Status: DISCONTINUED | OUTPATIENT
Start: 2021-03-28 | End: 2021-03-29

## 2021-03-27 RX ADMIN — ATORVASTATIN CALCIUM 40 MG: 20 TABLET, FILM COATED ORAL at 09:03

## 2021-03-27 RX ADMIN — CLEVIPIDINE 1 MG/HR: 0.5 EMULSION INTRAVENOUS at 09:03

## 2021-03-27 RX ADMIN — CHLOROTHIAZIDE SODIUM 250 MG: 500 INJECTION, POWDER, LYOPHILIZED, FOR SOLUTION INTRAVENOUS at 09:03

## 2021-03-27 RX ADMIN — CEFAZOLIN 2 G: 330 INJECTION, POWDER, FOR SOLUTION INTRAMUSCULAR; INTRAVENOUS at 12:03

## 2021-03-27 RX ADMIN — HYDROMORPHONE HYDROCHLORIDE 2 MG: 1 INJECTION, SOLUTION INTRAMUSCULAR; INTRAVENOUS; SUBCUTANEOUS at 10:03

## 2021-03-27 RX ADMIN — DOCUSATE SODIUM 100 MG: 100 CAPSULE, LIQUID FILLED ORAL at 09:03

## 2021-03-27 RX ADMIN — FUROSEMIDE 10 MG/HR: 10 INJECTION, SOLUTION INTRAMUSCULAR; INTRAVENOUS at 06:03

## 2021-03-27 RX ADMIN — CEFAZOLIN 2 G: 330 INJECTION, POWDER, FOR SOLUTION INTRAMUSCULAR; INTRAVENOUS at 04:03

## 2021-03-27 RX ADMIN — EPINEPHRINE 0.07 MCG/KG/MIN: 1 INJECTION INTRAMUSCULAR; INTRAVENOUS; SUBCUTANEOUS at 06:03

## 2021-03-27 RX ADMIN — POLYETHYLENE GLYCOL 3350 17 G: 17 POWDER, FOR SOLUTION ORAL at 09:03

## 2021-03-27 RX ADMIN — ACETAMINOPHEN 1000 MG: 500 TABLET ORAL at 06:03

## 2021-03-27 RX ADMIN — PROPOFOL 50 MCG/KG/MIN: 10 INJECTION, EMULSION INTRAVENOUS at 10:03

## 2021-03-27 RX ADMIN — ACETAMINOPHEN 1000 MG: 500 TABLET ORAL at 02:03

## 2021-03-27 RX ADMIN — MUPIROCIN: 20 OINTMENT TOPICAL at 09:03

## 2021-03-27 RX ADMIN — HYDROMORPHONE HYDROCHLORIDE 1 MG: 1 INJECTION, SOLUTION INTRAMUSCULAR; INTRAVENOUS; SUBCUTANEOUS at 05:03

## 2021-03-27 RX ADMIN — POTASSIUM CHLORIDE 20 MEQ: 200 INJECTION, SOLUTION INTRAVENOUS at 12:03

## 2021-03-27 RX ADMIN — EPINEPHRINE 0.1 MCG/KG/MIN: 1 INJECTION INTRAMUSCULAR; INTRAVENOUS; SUBCUTANEOUS at 07:03

## 2021-03-27 RX ADMIN — FAMOTIDINE 20 MG: 10 INJECTION INTRAVENOUS at 09:03

## 2021-03-27 RX ADMIN — ALBUMIN (HUMAN) 25 G: 12.5 SOLUTION INTRAVENOUS at 01:03

## 2021-03-27 RX ADMIN — POTASSIUM CHLORIDE 40 MEQ: 200 INJECTION, SOLUTION INTRAVENOUS at 05:03

## 2021-03-27 RX ADMIN — PROPOFOL 50 MCG/KG/MIN: 10 INJECTION, EMULSION INTRAVENOUS at 05:03

## 2021-03-27 RX ADMIN — VASOPRESSIN 0.04 UNITS/MIN: 20 INJECTION INTRAVENOUS at 04:03

## 2021-03-27 RX ADMIN — FUROSEMIDE 10 MG/HR: 10 INJECTION, SOLUTION INTRAMUSCULAR; INTRAVENOUS at 05:03

## 2021-03-27 RX ADMIN — SODIUM CHLORIDE 2.2 UNITS/HR: 9 INJECTION, SOLUTION INTRAVENOUS at 10:03

## 2021-03-27 RX ADMIN — HYDROMORPHONE HYDROCHLORIDE 1 MG: 1 INJECTION, SOLUTION INTRAMUSCULAR; INTRAVENOUS; SUBCUTANEOUS at 06:03

## 2021-03-27 RX ADMIN — PROPOFOL 50 MCG/KG/MIN: 10 INJECTION, EMULSION INTRAVENOUS at 09:03

## 2021-03-27 RX ADMIN — MILRINONE LACTATE IN DEXTROSE 0.38 MCG/KG/MIN: 200 INJECTION, SOLUTION INTRAVENOUS at 09:03

## 2021-03-27 RX ADMIN — MILRINONE LACTATE IN DEXTROSE 0.5 MCG/KG/MIN: 200 INJECTION, SOLUTION INTRAVENOUS at 01:03

## 2021-03-27 RX ADMIN — MILRINONE LACTATE IN DEXTROSE 0.12 MCG/KG/MIN: 200 INJECTION, SOLUTION INTRAVENOUS at 04:03

## 2021-03-28 PROBLEM — Z95.4 STATUS POST HEART VALVE REPLACEMENT: Status: ACTIVE | Noted: 2021-03-28

## 2021-03-28 PROBLEM — Z95.1 S/P CABG (CORONARY ARTERY BYPASS GRAFT): Status: ACTIVE | Noted: 2021-03-28

## 2021-03-28 PROBLEM — E11.9 TYPE 2 DIABETES MELLITUS WITHOUT COMPLICATION, WITHOUT LONG-TERM CURRENT USE OF INSULIN: Status: ACTIVE | Noted: 2020-01-15

## 2021-03-28 LAB
ALBUMIN SERPL BCP-MCNC: 3.6 G/DL (ref 3.5–5.2)
ALLENS TEST: ABNORMAL
ALLENS TEST: NORMAL
ALP SERPL-CCNC: 56 U/L (ref 55–135)
ALT SERPL W/O P-5'-P-CCNC: 7 U/L (ref 10–44)
ANION GAP SERPL CALC-SCNC: 11 MMOL/L (ref 8–16)
ANION GAP SERPL CALC-SCNC: 8 MMOL/L (ref 8–16)
AST SERPL-CCNC: 57 U/L (ref 10–40)
BASOPHILS # BLD AUTO: 0.06 K/UL (ref 0–0.2)
BASOPHILS # BLD AUTO: 0.07 K/UL (ref 0–0.2)
BASOPHILS NFR BLD: 0.7 % (ref 0–1.9)
BASOPHILS NFR BLD: 0.9 % (ref 0–1.9)
BILIRUB SERPL-MCNC: 0.9 MG/DL (ref 0.1–1)
BUN SERPL-MCNC: 26 MG/DL (ref 8–23)
BUN SERPL-MCNC: 28 MG/DL (ref 8–23)
CALCIUM SERPL-MCNC: 8.2 MG/DL (ref 8.7–10.5)
CALCIUM SERPL-MCNC: 8.3 MG/DL (ref 8.7–10.5)
CHLORIDE SERPL-SCNC: 112 MMOL/L (ref 95–110)
CHLORIDE SERPL-SCNC: 112 MMOL/L (ref 95–110)
CO2 SERPL-SCNC: 24 MMOL/L (ref 23–29)
CO2 SERPL-SCNC: 25 MMOL/L (ref 23–29)
CREAT SERPL-MCNC: 1.9 MG/DL (ref 0.5–1.4)
CREAT SERPL-MCNC: 2 MG/DL (ref 0.5–1.4)
DELSYS: ABNORMAL
DELSYS: NORMAL
DIFFERENTIAL METHOD: ABNORMAL
DIFFERENTIAL METHOD: ABNORMAL
EOSINOPHIL # BLD AUTO: 0.3 K/UL (ref 0–0.5)
EOSINOPHIL # BLD AUTO: 0.4 K/UL (ref 0–0.5)
EOSINOPHIL NFR BLD: 3.5 % (ref 0–8)
EOSINOPHIL NFR BLD: 4.6 % (ref 0–8)
ERYTHROCYTE [DISTWIDTH] IN BLOOD BY AUTOMATED COUNT: 16.3 % (ref 11.5–14.5)
ERYTHROCYTE [DISTWIDTH] IN BLOOD BY AUTOMATED COUNT: 16.4 % (ref 11.5–14.5)
ERYTHROCYTE [SEDIMENTATION RATE] IN BLOOD BY WESTERGREN METHOD: 22 MM/H
ERYTHROCYTE [SEDIMENTATION RATE] IN BLOOD BY WESTERGREN METHOD: 22 MM/H
EST. GFR  (AFRICAN AMERICAN): 37.2 ML/MIN/1.73 M^2
EST. GFR  (AFRICAN AMERICAN): 39.6 ML/MIN/1.73 M^2
EST. GFR  (NON AFRICAN AMERICAN): 32.2 ML/MIN/1.73 M^2
EST. GFR  (NON AFRICAN AMERICAN): 34.2 ML/MIN/1.73 M^2
FIO2: 40
FLOW: 4
FLOW: 4
GLUCOSE SERPL-MCNC: 131 MG/DL (ref 70–110)
GLUCOSE SERPL-MCNC: 162 MG/DL (ref 70–110)
HCO3 UR-SCNC: 25.2 MMOL/L (ref 24–28)
HCO3 UR-SCNC: 26.7 MMOL/L (ref 24–28)
HCO3 UR-SCNC: 27.4 MMOL/L (ref 24–28)
HCO3 UR-SCNC: 27.7 MMOL/L (ref 24–28)
HCO3 UR-SCNC: 28.1 MMOL/L (ref 24–28)
HCO3 UR-SCNC: 28.6 MMOL/L (ref 24–28)
HCT VFR BLD AUTO: 28.6 % (ref 40–54)
HCT VFR BLD AUTO: 30.8 % (ref 40–54)
HCT VFR BLD CALC: 26 %PCV (ref 36–54)
HCT VFR BLD CALC: 28 %PCV (ref 36–54)
HCT VFR BLD CALC: 29 %PCV (ref 36–54)
HCT VFR BLD CALC: 31 %PCV (ref 36–54)
HCT VFR BLD CALC: 31 %PCV (ref 36–54)
HGB BLD-MCNC: 10.4 G/DL (ref 14–18)
HGB BLD-MCNC: 9.7 G/DL (ref 14–18)
IMM GRANULOCYTES # BLD AUTO: 0.03 K/UL (ref 0–0.04)
IMM GRANULOCYTES # BLD AUTO: 0.05 K/UL (ref 0–0.04)
IMM GRANULOCYTES NFR BLD AUTO: 0.3 % (ref 0–0.5)
IMM GRANULOCYTES NFR BLD AUTO: 0.6 % (ref 0–0.5)
LDH SERPL L TO P-CCNC: 0.45 MMOL/L (ref 0.36–1.25)
LDH SERPL L TO P-CCNC: 0.61 MMOL/L (ref 0.36–1.25)
LDH SERPL L TO P-CCNC: 0.65 MMOL/L (ref 0.36–1.25)
LDH SERPL L TO P-CCNC: 0.78 MMOL/L (ref 0.36–1.25)
LDH SERPL L TO P-CCNC: 1.28 MMOL/L (ref 0.36–1.25)
LYMPHOCYTES # BLD AUTO: 0.8 K/UL (ref 1–4.8)
LYMPHOCYTES # BLD AUTO: 1 K/UL (ref 1–4.8)
LYMPHOCYTES NFR BLD: 10.5 % (ref 18–48)
LYMPHOCYTES NFR BLD: 11.6 % (ref 18–48)
MAGNESIUM SERPL-MCNC: 2.4 MG/DL (ref 1.6–2.6)
MAGNESIUM SERPL-MCNC: 2.4 MG/DL (ref 1.6–2.6)
MAGNESIUM SERPL-MCNC: 2.5 MG/DL (ref 1.6–2.6)
MCH RBC QN AUTO: 28 PG (ref 27–31)
MCH RBC QN AUTO: 28.2 PG (ref 27–31)
MCHC RBC AUTO-ENTMCNC: 33.8 G/DL (ref 32–36)
MCHC RBC AUTO-ENTMCNC: 33.9 G/DL (ref 32–36)
MCV RBC AUTO: 83 FL (ref 82–98)
MCV RBC AUTO: 83 FL (ref 82–98)
MODE: ABNORMAL
MODE: NORMAL
MONOCYTES # BLD AUTO: 0.6 K/UL (ref 0.3–1)
MONOCYTES # BLD AUTO: 0.7 K/UL (ref 0.3–1)
MONOCYTES NFR BLD: 7.8 % (ref 4–15)
MONOCYTES NFR BLD: 8 % (ref 4–15)
NEUTROPHILS # BLD AUTO: 6.1 K/UL (ref 1.8–7.7)
NEUTROPHILS # BLD AUTO: 6.7 K/UL (ref 1.8–7.7)
NEUTROPHILS NFR BLD: 75 % (ref 38–73)
NEUTROPHILS NFR BLD: 76.5 % (ref 38–73)
NRBC BLD-RTO: 0 /100 WBC
NRBC BLD-RTO: 0 /100 WBC
PCO2 BLDA: 34.6 MMHG (ref 35–45)
PCO2 BLDA: 36.9 MMHG (ref 35–45)
PCO2 BLDA: 37.6 MMHG (ref 35–45)
PCO2 BLDA: 42.9 MMHG (ref 35–45)
PCO2 BLDA: 43.8 MMHG (ref 35–45)
PCO2 BLDA: 45.7 MMHG (ref 35–45)
PEEP: 5
PH SMN: 7.39 [PH] (ref 7.35–7.45)
PH SMN: 7.42 [PH] (ref 7.35–7.45)
PH SMN: 7.42 [PH] (ref 7.35–7.45)
PH SMN: 7.46 [PH] (ref 7.35–7.45)
PH SMN: 7.47 [PH] (ref 7.35–7.45)
PH SMN: 7.48 [PH] (ref 7.35–7.45)
PHOSPHATE SERPL-MCNC: 4 MG/DL (ref 2.7–4.5)
PHOSPHATE SERPL-MCNC: 4 MG/DL (ref 2.7–4.5)
PHOSPHATE SERPL-MCNC: 4.6 MG/DL (ref 2.7–4.5)
PLATELET # BLD AUTO: 102 K/UL (ref 150–350)
PLATELET # BLD AUTO: 103 K/UL (ref 150–350)
PMV BLD AUTO: 11.1 FL (ref 9.2–12.9)
PMV BLD AUTO: 11.1 FL (ref 9.2–12.9)
PO2 BLDA: 55 MMHG (ref 80–100)
PO2 BLDA: 60 MMHG (ref 80–100)
PO2 BLDA: 71 MMHG (ref 80–100)
PO2 BLDA: 74 MMHG (ref 80–100)
PO2 BLDA: 82 MMHG (ref 80–100)
PO2 BLDA: 90 MMHG (ref 80–100)
POC BE: 2 MMOL/L
POC BE: 3 MMOL/L
POC BE: 3 MMOL/L
POC BE: 4 MMOL/L
POC IONIZED CALCIUM: 1.02 MMOL/L (ref 1.06–1.42)
POC IONIZED CALCIUM: 1.1 MMOL/L (ref 1.06–1.42)
POC SATURATED O2: 90 % (ref 95–100)
POC SATURATED O2: 92 % (ref 95–100)
POC SATURATED O2: 94 % (ref 95–100)
POC SATURATED O2: 95 % (ref 95–100)
POC SATURATED O2: 96 % (ref 95–100)
POC SATURATED O2: 98 % (ref 95–100)
POC TCO2: 26 MMOL/L (ref 23–27)
POC TCO2: 28 MMOL/L (ref 23–27)
POC TCO2: 28 MMOL/L (ref 23–27)
POC TCO2: 29 MMOL/L (ref 23–27)
POC TCO2: 29 MMOL/L (ref 23–27)
POC TCO2: 30 MMOL/L (ref 23–27)
POCT GLUCOSE: 130 MG/DL (ref 70–110)
POCT GLUCOSE: 139 MG/DL (ref 70–110)
POCT GLUCOSE: 139 MG/DL (ref 70–110)
POCT GLUCOSE: 148 MG/DL (ref 70–110)
POCT GLUCOSE: 148 MG/DL (ref 70–110)
POCT GLUCOSE: 154 MG/DL (ref 70–110)
POCT GLUCOSE: 156 MG/DL (ref 70–110)
POTASSIUM BLD-SCNC: 3.9 MMOL/L (ref 3.5–5.1)
POTASSIUM BLD-SCNC: 4.1 MMOL/L (ref 3.5–5.1)
POTASSIUM BLD-SCNC: 4.1 MMOL/L (ref 3.5–5.1)
POTASSIUM BLD-SCNC: 4.2 MMOL/L (ref 3.5–5.1)
POTASSIUM BLD-SCNC: 4.2 MMOL/L (ref 3.5–5.1)
POTASSIUM SERPL-SCNC: 4 MMOL/L (ref 3.5–5.1)
POTASSIUM SERPL-SCNC: 4 MMOL/L (ref 3.5–5.1)
POTASSIUM SERPL-SCNC: 4.2 MMOL/L (ref 3.5–5.1)
POTASSIUM SERPL-SCNC: 4.2 MMOL/L (ref 3.5–5.1)
PROT SERPL-MCNC: 6.1 G/DL (ref 6–8.4)
PS: 12
PS: 8
PS: 8
RBC # BLD AUTO: 3.44 M/UL (ref 4.6–6.2)
RBC # BLD AUTO: 3.71 M/UL (ref 4.6–6.2)
SAMPLE: ABNORMAL
SAMPLE: NORMAL
SITE: ABNORMAL
SITE: NORMAL
SODIUM BLD-SCNC: 145 MMOL/L (ref 136–145)
SODIUM BLD-SCNC: 146 MMOL/L (ref 136–145)
SODIUM SERPL-SCNC: 145 MMOL/L (ref 136–145)
SODIUM SERPL-SCNC: 147 MMOL/L (ref 136–145)
SPONT RATE: 17
VT: 460
VT: 460
WBC # BLD AUTO: 7.92 K/UL (ref 3.9–12.7)
WBC # BLD AUTO: 8.95 K/UL (ref 3.9–12.7)

## 2021-03-28 PROCEDURE — 84100 ASSAY OF PHOSPHORUS: CPT | Mod: 91 | Performed by: STUDENT IN AN ORGANIZED HEALTH CARE EDUCATION/TRAINING PROGRAM

## 2021-03-28 PROCEDURE — 99222 PR INITIAL HOSPITAL CARE,LEVL II: ICD-10-PCS | Mod: ,,, | Performed by: NURSE PRACTITIONER

## 2021-03-28 PROCEDURE — 82803 BLOOD GASES ANY COMBINATION: CPT

## 2021-03-28 PROCEDURE — 63600175 PHARM REV CODE 636 W HCPCS: Performed by: STUDENT IN AN ORGANIZED HEALTH CARE EDUCATION/TRAINING PROGRAM

## 2021-03-28 PROCEDURE — 94003 VENT MGMT INPAT SUBQ DAY: CPT

## 2021-03-28 PROCEDURE — 85014 HEMATOCRIT: CPT

## 2021-03-28 PROCEDURE — 99900017 HC EXTUBATION W/PARAMETERS (STAT)

## 2021-03-28 PROCEDURE — 94150 VITAL CAPACITY TEST: CPT

## 2021-03-28 PROCEDURE — 83605 ASSAY OF LACTIC ACID: CPT

## 2021-03-28 PROCEDURE — 94761 N-INVAS EAR/PLS OXIMETRY MLT: CPT

## 2021-03-28 PROCEDURE — 63600175 PHARM REV CODE 636 W HCPCS: Performed by: THORACIC SURGERY (CARDIOTHORACIC VASCULAR SURGERY)

## 2021-03-28 PROCEDURE — 84295 ASSAY OF SERUM SODIUM: CPT

## 2021-03-28 PROCEDURE — 25000003 PHARM REV CODE 250: Performed by: STUDENT IN AN ORGANIZED HEALTH CARE EDUCATION/TRAINING PROGRAM

## 2021-03-28 PROCEDURE — 83735 ASSAY OF MAGNESIUM: CPT | Mod: 91 | Performed by: STUDENT IN AN ORGANIZED HEALTH CARE EDUCATION/TRAINING PROGRAM

## 2021-03-28 PROCEDURE — 99291 PR CRITICAL CARE, E/M 30-74 MINUTES: ICD-10-PCS | Mod: ,,, | Performed by: ANESTHESIOLOGY

## 2021-03-28 PROCEDURE — 82330 ASSAY OF CALCIUM: CPT

## 2021-03-28 PROCEDURE — 85025 COMPLETE CBC W/AUTO DIFF WBC: CPT | Performed by: THORACIC SURGERY (CARDIOTHORACIC VASCULAR SURGERY)

## 2021-03-28 PROCEDURE — C9248 INJ, CLEVIDIPINE BUTYRATE: HCPCS | Performed by: THORACIC SURGERY (CARDIOTHORACIC VASCULAR SURGERY)

## 2021-03-28 PROCEDURE — 80048 BASIC METABOLIC PNL TOTAL CA: CPT | Performed by: STUDENT IN AN ORGANIZED HEALTH CARE EDUCATION/TRAINING PROGRAM

## 2021-03-28 PROCEDURE — 99900035 HC TECH TIME PER 15 MIN (STAT)

## 2021-03-28 PROCEDURE — 84132 ASSAY OF SERUM POTASSIUM: CPT

## 2021-03-28 PROCEDURE — 99900026 HC AIRWAY MAINTENANCE (STAT)

## 2021-03-28 PROCEDURE — 94010 BREATHING CAPACITY TEST: CPT

## 2021-03-28 PROCEDURE — 84132 ASSAY OF SERUM POTASSIUM: CPT | Performed by: STUDENT IN AN ORGANIZED HEALTH CARE EDUCATION/TRAINING PROGRAM

## 2021-03-28 PROCEDURE — 99291 CRITICAL CARE FIRST HOUR: CPT | Mod: ,,, | Performed by: ANESTHESIOLOGY

## 2021-03-28 PROCEDURE — 84100 ASSAY OF PHOSPHORUS: CPT | Performed by: THORACIC SURGERY (CARDIOTHORACIC VASCULAR SURGERY)

## 2021-03-28 PROCEDURE — 37799 UNLISTED PX VASCULAR SURGERY: CPT

## 2021-03-28 PROCEDURE — 99222 1ST HOSP IP/OBS MODERATE 55: CPT | Mod: ,,, | Performed by: NURSE PRACTITIONER

## 2021-03-28 PROCEDURE — 27000221 HC OXYGEN, UP TO 24 HOURS

## 2021-03-28 PROCEDURE — 80053 COMPREHEN METABOLIC PANEL: CPT | Performed by: THORACIC SURGERY (CARDIOTHORACIC VASCULAR SURGERY)

## 2021-03-28 PROCEDURE — 20000000 HC ICU ROOM

## 2021-03-28 PROCEDURE — 25000003 PHARM REV CODE 250: Performed by: THORACIC SURGERY (CARDIOTHORACIC VASCULAR SURGERY)

## 2021-03-28 RX ORDER — DOCUSATE SODIUM 50 MG/5ML
100 LIQUID ORAL 2 TIMES DAILY
Status: DISCONTINUED | OUTPATIENT
Start: 2021-03-28 | End: 2021-04-02 | Stop reason: HOSPADM

## 2021-03-28 RX ORDER — INSULIN ASPART 100 [IU]/ML
0-5 INJECTION, SOLUTION INTRAVENOUS; SUBCUTANEOUS EVERY 6 HOURS PRN
Status: DISCONTINUED | OUTPATIENT
Start: 2021-03-28 | End: 2021-03-31

## 2021-03-28 RX ORDER — GLUCAGON 1 MG
1 KIT INJECTION
Status: DISCONTINUED | OUTPATIENT
Start: 2021-03-28 | End: 2021-03-31

## 2021-03-28 RX ORDER — HYDROMORPHONE HYDROCHLORIDE 1 MG/ML
0.5 INJECTION, SOLUTION INTRAMUSCULAR; INTRAVENOUS; SUBCUTANEOUS ONCE
Status: COMPLETED | OUTPATIENT
Start: 2021-03-28 | End: 2021-03-28

## 2021-03-28 RX ORDER — BISACODYL 10 MG
10 SUPPOSITORY, RECTAL RECTAL DAILY PRN
Status: DISCONTINUED | OUTPATIENT
Start: 2021-03-28 | End: 2021-04-02 | Stop reason: HOSPADM

## 2021-03-28 RX ADMIN — AMIODARONE HYDROCHLORIDE 1 MG/MIN: 1.8 INJECTION, SOLUTION INTRAVENOUS at 09:03

## 2021-03-28 RX ADMIN — HYDROMORPHONE HYDROCHLORIDE 1 MG: 1 INJECTION, SOLUTION INTRAMUSCULAR; INTRAVENOUS; SUBCUTANEOUS at 08:03

## 2021-03-28 RX ADMIN — FAMOTIDINE 20 MG: 10 INJECTION INTRAVENOUS at 08:03

## 2021-03-28 RX ADMIN — CLEVIPIDINE 1 MG/HR: 0.5 EMULSION INTRAVENOUS at 10:03

## 2021-03-28 RX ADMIN — BISACODYL 10 MG: 10 SUPPOSITORY RECTAL at 07:03

## 2021-03-28 RX ADMIN — HYDROMORPHONE HYDROCHLORIDE 1 MG: 1 INJECTION, SOLUTION INTRAMUSCULAR; INTRAVENOUS; SUBCUTANEOUS at 03:03

## 2021-03-28 RX ADMIN — ACETAMINOPHEN 1000 MG: 500 TABLET ORAL at 05:03

## 2021-03-28 RX ADMIN — ASPIRIN 325 MG ORAL TABLET 325 MG: 325 PILL ORAL at 08:03

## 2021-03-28 RX ADMIN — FUROSEMIDE 10 MG/HR: 10 INJECTION, SOLUTION INTRAMUSCULAR; INTRAVENOUS at 02:03

## 2021-03-28 RX ADMIN — CLEVIPIDINE 11 MG/HR: 0.5 EMULSION INTRAVENOUS at 04:03

## 2021-03-28 RX ADMIN — DEXMEDETOMIDINE HYDROCHLORIDE 0.7 MCG/KG/HR: 4 INJECTION, SOLUTION INTRAVENOUS at 07:03

## 2021-03-28 RX ADMIN — EPINEPHRINE 0.05 MCG/KG/MIN: 1 INJECTION INTRAMUSCULAR; INTRAVENOUS; SUBCUTANEOUS at 08:03

## 2021-03-28 RX ADMIN — MILRINONE LACTATE IN DEXTROSE 0.38 MCG/KG/MIN: 200 INJECTION, SOLUTION INTRAVENOUS at 02:03

## 2021-03-28 RX ADMIN — HYDROMORPHONE HYDROCHLORIDE 0.5 MG: 1 INJECTION, SOLUTION INTRAMUSCULAR; INTRAVENOUS; SUBCUTANEOUS at 02:03

## 2021-03-28 RX ADMIN — DEXMEDETOMIDINE HYDROCHLORIDE 0.2 MCG/KG/HR: 4 INJECTION, SOLUTION INTRAVENOUS at 03:03

## 2021-03-28 RX ADMIN — PROPOFOL 50 MCG/KG/MIN: 10 INJECTION, EMULSION INTRAVENOUS at 01:03

## 2021-03-28 RX ADMIN — CHLOROTHIAZIDE SODIUM 250 MG: 500 INJECTION, POWDER, LYOPHILIZED, FOR SOLUTION INTRAVENOUS at 02:03

## 2021-03-28 RX ADMIN — MUPIROCIN: 20 OINTMENT TOPICAL at 08:03

## 2021-03-28 RX ADMIN — MILRINONE LACTATE IN DEXTROSE 0.38 MCG/KG/MIN: 200 INJECTION, SOLUTION INTRAVENOUS at 05:03

## 2021-03-28 RX ADMIN — HYDROMORPHONE HYDROCHLORIDE 0.5 MG: 1 INJECTION, SOLUTION INTRAMUSCULAR; INTRAVENOUS; SUBCUTANEOUS at 05:03

## 2021-03-28 RX ADMIN — DOCUSATE SODIUM 100 MG: 50 LIQUID ORAL at 08:03

## 2021-03-28 RX ADMIN — MILRINONE LACTATE IN DEXTROSE 0.38 MCG/KG/MIN: 200 INJECTION, SOLUTION INTRAVENOUS at 11:03

## 2021-03-28 RX ADMIN — POLYETHYLENE GLYCOL 3350 17 G: 17 POWDER, FOR SOLUTION ORAL at 08:03

## 2021-03-29 LAB
ALBUMIN SERPL BCP-MCNC: 3.6 G/DL (ref 3.5–5.2)
ALBUMIN SERPL BCP-MCNC: 3.6 G/DL (ref 3.5–5.2)
ALP SERPL-CCNC: 73 U/L (ref 55–135)
ALP SERPL-CCNC: 73 U/L (ref 55–135)
ALT SERPL W/O P-5'-P-CCNC: 7 U/L (ref 10–44)
ALT SERPL W/O P-5'-P-CCNC: 7 U/L (ref 10–44)
ANION GAP SERPL CALC-SCNC: 12 MMOL/L (ref 8–16)
ANION GAP SERPL CALC-SCNC: 12 MMOL/L (ref 8–16)
ANISOCYTOSIS BLD QL SMEAR: SLIGHT
AST SERPL-CCNC: 46 U/L (ref 10–40)
AST SERPL-CCNC: 46 U/L (ref 10–40)
BASOPHILS # BLD AUTO: ABNORMAL K/UL (ref 0–0.2)
BASOPHILS NFR BLD: 1 % (ref 0–1.9)
BILIRUB SERPL-MCNC: 1 MG/DL (ref 0.1–1)
BILIRUB SERPL-MCNC: 1 MG/DL (ref 0.1–1)
BLD PROD TYP BPU: NORMAL
BLOOD UNIT EXPIRATION DATE: NORMAL
BLOOD UNIT TYPE CODE: 5100
BLOOD UNIT TYPE: NORMAL
BUN SERPL-MCNC: 31 MG/DL (ref 8–23)
BUN SERPL-MCNC: 31 MG/DL (ref 8–23)
BURR CELLS BLD QL SMEAR: ABNORMAL
CALCIUM SERPL-MCNC: 8.4 MG/DL (ref 8.7–10.5)
CALCIUM SERPL-MCNC: 8.4 MG/DL (ref 8.7–10.5)
CHLORIDE SERPL-SCNC: 108 MMOL/L (ref 95–110)
CHLORIDE SERPL-SCNC: 108 MMOL/L (ref 95–110)
CO2 SERPL-SCNC: 25 MMOL/L (ref 23–29)
CO2 SERPL-SCNC: 25 MMOL/L (ref 23–29)
CODING SYSTEM: NORMAL
CREAT SERPL-MCNC: 1.9 MG/DL (ref 0.5–1.4)
CREAT SERPL-MCNC: 1.9 MG/DL (ref 0.5–1.4)
DIFFERENTIAL METHOD: ABNORMAL
DISPENSE STATUS: NORMAL
EOSINOPHIL # BLD AUTO: ABNORMAL K/UL (ref 0–0.5)
EOSINOPHIL NFR BLD: 5 % (ref 0–8)
ERYTHROCYTE [DISTWIDTH] IN BLOOD BY AUTOMATED COUNT: 16.7 % (ref 11.5–14.5)
EST. GFR  (AFRICAN AMERICAN): 39.6 ML/MIN/1.73 M^2
EST. GFR  (AFRICAN AMERICAN): 39.6 ML/MIN/1.73 M^2
EST. GFR  (NON AFRICAN AMERICAN): 34.2 ML/MIN/1.73 M^2
EST. GFR  (NON AFRICAN AMERICAN): 34.2 ML/MIN/1.73 M^2
GIANT PLATELETS BLD QL SMEAR: PRESENT
GLUCOSE SERPL-MCNC: 121 MG/DL (ref 70–110)
GLUCOSE SERPL-MCNC: 121 MG/DL (ref 70–110)
HCT VFR BLD AUTO: 31.7 % (ref 40–54)
HGB BLD-MCNC: 10.5 G/DL (ref 14–18)
HYPOCHROMIA BLD QL SMEAR: ABNORMAL
IMM GRANULOCYTES # BLD AUTO: ABNORMAL K/UL (ref 0–0.04)
IMM GRANULOCYTES NFR BLD AUTO: ABNORMAL % (ref 0–0.5)
LYMPHOCYTES # BLD AUTO: ABNORMAL K/UL (ref 1–4.8)
LYMPHOCYTES NFR BLD: 7 % (ref 18–48)
MAGNESIUM SERPL-MCNC: 2.2 MG/DL (ref 1.6–2.6)
MAGNESIUM SERPL-MCNC: 2.4 MG/DL (ref 1.6–2.6)
MCH RBC QN AUTO: 28.5 PG (ref 27–31)
MCHC RBC AUTO-ENTMCNC: 33.1 G/DL (ref 32–36)
MCV RBC AUTO: 86 FL (ref 82–98)
MONOCYTES # BLD AUTO: ABNORMAL K/UL (ref 0.3–1)
MONOCYTES NFR BLD: 6 % (ref 4–15)
NEUTROPHILS NFR BLD: 78 % (ref 38–73)
NEUTS BAND NFR BLD MANUAL: 3 %
NRBC BLD-RTO: 0 /100 WBC
NUM UNITS TRANS PACKED RBC: NORMAL
OVALOCYTES BLD QL SMEAR: ABNORMAL
PHOSPHATE SERPL-MCNC: 3.5 MG/DL (ref 2.7–4.5)
PHOSPHATE SERPL-MCNC: 4.2 MG/DL (ref 2.7–4.5)
PLATELET # BLD AUTO: 63 K/UL (ref 150–450)
PLATELET BLD QL SMEAR: ABNORMAL
PMV BLD AUTO: 11.5 FL (ref 9.2–12.9)
POCT GLUCOSE: 100 MG/DL (ref 70–110)
POCT GLUCOSE: 105 MG/DL (ref 70–110)
POCT GLUCOSE: 122 MG/DL (ref 70–110)
POCT GLUCOSE: 157 MG/DL (ref 70–110)
POCT GLUCOSE: 180 MG/DL (ref 70–110)
POCT GLUCOSE: 65 MG/DL (ref 70–110)
POCT GLUCOSE: 91 MG/DL (ref 70–110)
POCT GLUCOSE: 92 MG/DL (ref 70–110)
POIKILOCYTOSIS BLD QL SMEAR: SLIGHT
POLYCHROMASIA BLD QL SMEAR: ABNORMAL
POTASSIUM SERPL-SCNC: 4 MMOL/L (ref 3.5–5.1)
POTASSIUM SERPL-SCNC: 4.3 MMOL/L (ref 3.5–5.1)
POTASSIUM SERPL-SCNC: 4.5 MMOL/L (ref 3.5–5.1)
POTASSIUM SERPL-SCNC: 4.5 MMOL/L (ref 3.5–5.1)
PROT SERPL-MCNC: 6.6 G/DL (ref 6–8.4)
PROT SERPL-MCNC: 6.6 G/DL (ref 6–8.4)
RBC # BLD AUTO: 3.68 M/UL (ref 4.6–6.2)
SCHISTOCYTES BLD QL SMEAR: ABNORMAL
SODIUM SERPL-SCNC: 145 MMOL/L (ref 136–145)
SODIUM SERPL-SCNC: 145 MMOL/L (ref 136–145)
TARGETS BLD QL SMEAR: ABNORMAL
WBC # BLD AUTO: 9.57 K/UL (ref 3.9–12.7)

## 2021-03-29 PROCEDURE — 63600175 PHARM REV CODE 636 W HCPCS: Performed by: STUDENT IN AN ORGANIZED HEALTH CARE EDUCATION/TRAINING PROGRAM

## 2021-03-29 PROCEDURE — 93010 ELECTROCARDIOGRAM REPORT: CPT | Mod: ,,, | Performed by: INTERNAL MEDICINE

## 2021-03-29 PROCEDURE — 85027 COMPLETE CBC AUTOMATED: CPT | Performed by: THORACIC SURGERY (CARDIOTHORACIC VASCULAR SURGERY)

## 2021-03-29 PROCEDURE — 27000221 HC OXYGEN, UP TO 24 HOURS

## 2021-03-29 PROCEDURE — 80053 COMPREHEN METABOLIC PANEL: CPT | Performed by: THORACIC SURGERY (CARDIOTHORACIC VASCULAR SURGERY)

## 2021-03-29 PROCEDURE — 25000003 PHARM REV CODE 250: Performed by: STUDENT IN AN ORGANIZED HEALTH CARE EDUCATION/TRAINING PROGRAM

## 2021-03-29 PROCEDURE — 99232 SBSQ HOSP IP/OBS MODERATE 35: CPT | Mod: ,,, | Performed by: NURSE PRACTITIONER

## 2021-03-29 PROCEDURE — 99291 PR CRITICAL CARE, E/M 30-74 MINUTES: ICD-10-PCS | Mod: ,,, | Performed by: SURGERY

## 2021-03-29 PROCEDURE — 84132 ASSAY OF SERUM POTASSIUM: CPT | Mod: 91 | Performed by: STUDENT IN AN ORGANIZED HEALTH CARE EDUCATION/TRAINING PROGRAM

## 2021-03-29 PROCEDURE — 93010 EKG 12-LEAD: ICD-10-PCS | Mod: ,,, | Performed by: INTERNAL MEDICINE

## 2021-03-29 PROCEDURE — 97110 THERAPEUTIC EXERCISES: CPT

## 2021-03-29 PROCEDURE — 92610 EVALUATE SWALLOWING FUNCTION: CPT

## 2021-03-29 PROCEDURE — 97530 THERAPEUTIC ACTIVITIES: CPT

## 2021-03-29 PROCEDURE — 83735 ASSAY OF MAGNESIUM: CPT | Performed by: THORACIC SURGERY (CARDIOTHORACIC VASCULAR SURGERY)

## 2021-03-29 PROCEDURE — 99900035 HC TECH TIME PER 15 MIN (STAT)

## 2021-03-29 PROCEDURE — 97165 OT EVAL LOW COMPLEX 30 MIN: CPT

## 2021-03-29 PROCEDURE — 83735 ASSAY OF MAGNESIUM: CPT | Mod: 91 | Performed by: STUDENT IN AN ORGANIZED HEALTH CARE EDUCATION/TRAINING PROGRAM

## 2021-03-29 PROCEDURE — 99291 CRITICAL CARE FIRST HOUR: CPT | Mod: ,,, | Performed by: SURGERY

## 2021-03-29 PROCEDURE — C9248 INJ, CLEVIDIPINE BUTYRATE: HCPCS | Performed by: THORACIC SURGERY (CARDIOTHORACIC VASCULAR SURGERY)

## 2021-03-29 PROCEDURE — 97535 SELF CARE MNGMENT TRAINING: CPT

## 2021-03-29 PROCEDURE — 99232 PR SUBSEQUENT HOSPITAL CARE,LEVL II: ICD-10-PCS | Mod: ,,, | Performed by: NURSE PRACTITIONER

## 2021-03-29 PROCEDURE — 63600175 PHARM REV CODE 636 W HCPCS: Performed by: THORACIC SURGERY (CARDIOTHORACIC VASCULAR SURGERY)

## 2021-03-29 PROCEDURE — 20000000 HC ICU ROOM

## 2021-03-29 PROCEDURE — 84100 ASSAY OF PHOSPHORUS: CPT | Performed by: THORACIC SURGERY (CARDIOTHORACIC VASCULAR SURGERY)

## 2021-03-29 PROCEDURE — 97162 PT EVAL MOD COMPLEX 30 MIN: CPT

## 2021-03-29 PROCEDURE — 25000003 PHARM REV CODE 250: Performed by: THORACIC SURGERY (CARDIOTHORACIC VASCULAR SURGERY)

## 2021-03-29 PROCEDURE — 94761 N-INVAS EAR/PLS OXIMETRY MLT: CPT

## 2021-03-29 PROCEDURE — 84100 ASSAY OF PHOSPHORUS: CPT | Mod: 91 | Performed by: STUDENT IN AN ORGANIZED HEALTH CARE EDUCATION/TRAINING PROGRAM

## 2021-03-29 PROCEDURE — 93005 ELECTROCARDIOGRAM TRACING: CPT

## 2021-03-29 PROCEDURE — 85007 BL SMEAR W/DIFF WBC COUNT: CPT | Performed by: THORACIC SURGERY (CARDIOTHORACIC VASCULAR SURGERY)

## 2021-03-29 RX ORDER — ISOSORBIDE MONONITRATE 30 MG/1
30 TABLET, EXTENDED RELEASE ORAL ONCE
Status: COMPLETED | OUTPATIENT
Start: 2021-03-29 | End: 2021-03-29

## 2021-03-29 RX ORDER — WARFARIN 4 MG/1
4 TABLET ORAL DAILY
Status: DISCONTINUED | OUTPATIENT
Start: 2021-03-29 | End: 2021-04-02 | Stop reason: HOSPADM

## 2021-03-29 RX ORDER — HYDROMORPHONE HYDROCHLORIDE 1 MG/ML
0.5 INJECTION, SOLUTION INTRAMUSCULAR; INTRAVENOUS; SUBCUTANEOUS EVERY 4 HOURS PRN
Status: DISCONTINUED | OUTPATIENT
Start: 2021-03-29 | End: 2021-04-02 | Stop reason: HOSPADM

## 2021-03-29 RX ORDER — DIPHENHYDRAMINE HCL 25 MG
25 CAPSULE ORAL EVERY 6 HOURS PRN
Status: DISCONTINUED | OUTPATIENT
Start: 2021-03-29 | End: 2021-04-02 | Stop reason: HOSPADM

## 2021-03-29 RX ORDER — ISOSORBIDE MONONITRATE 30 MG/1
30 TABLET, EXTENDED RELEASE ORAL DAILY
Status: DISCONTINUED | OUTPATIENT
Start: 2021-03-29 | End: 2021-03-30

## 2021-03-29 RX ORDER — OXYCODONE HYDROCHLORIDE 10 MG/1
10 TABLET ORAL EVERY 4 HOURS PRN
Status: DISCONTINUED | OUTPATIENT
Start: 2021-03-29 | End: 2021-04-02 | Stop reason: HOSPADM

## 2021-03-29 RX ORDER — OXYCODONE HYDROCHLORIDE 5 MG/1
5 TABLET ORAL EVERY 4 HOURS PRN
Status: DISCONTINUED | OUTPATIENT
Start: 2021-03-29 | End: 2021-04-02 | Stop reason: HOSPADM

## 2021-03-29 RX ADMIN — HYDROMORPHONE HYDROCHLORIDE 1 MG: 1 INJECTION, SOLUTION INTRAMUSCULAR; INTRAVENOUS; SUBCUTANEOUS at 09:03

## 2021-03-29 RX ADMIN — OXYCODONE HYDROCHLORIDE 10 MG: 10 TABLET ORAL at 08:03

## 2021-03-29 RX ADMIN — CLEVIPIDINE 8 MG/HR: 0.5 EMULSION INTRAVENOUS at 08:03

## 2021-03-29 RX ADMIN — AMIODARONE HYDROCHLORIDE 150 MG: 1.5 INJECTION, SOLUTION INTRAVENOUS at 08:03

## 2021-03-29 RX ADMIN — MUPIROCIN: 20 OINTMENT TOPICAL at 09:03

## 2021-03-29 RX ADMIN — CHLOROTHIAZIDE SODIUM 250 MG: 500 INJECTION, POWDER, LYOPHILIZED, FOR SOLUTION INTRAVENOUS at 03:03

## 2021-03-29 RX ADMIN — CLEVIPIDINE 7 MG/HR: 0.5 EMULSION INTRAVENOUS at 02:03

## 2021-03-29 RX ADMIN — ATORVASTATIN CALCIUM 40 MG: 20 TABLET, FILM COATED ORAL at 08:03

## 2021-03-29 RX ADMIN — ACETAMINOPHEN 1000 MG: 500 TABLET ORAL at 02:03

## 2021-03-29 RX ADMIN — POLYETHYLENE GLYCOL 3350 17 G: 17 POWDER, FOR SOLUTION ORAL at 09:03

## 2021-03-29 RX ADMIN — AMIODARONE HYDROCHLORIDE 1 MG/MIN: 1.8 INJECTION, SOLUTION INTRAVENOUS at 04:03

## 2021-03-29 RX ADMIN — HYPROMELLOSE 2910 1 DROP: 5 SOLUTION OPHTHALMIC at 08:03

## 2021-03-29 RX ADMIN — WARFARIN SODIUM 4 MG: 4 TABLET ORAL at 06:03

## 2021-03-29 RX ADMIN — HYPROMELLOSE 2910 1 DROP: 5 SOLUTION OPHTHALMIC at 11:03

## 2021-03-29 RX ADMIN — MUPIROCIN: 20 OINTMENT TOPICAL at 08:03

## 2021-03-29 RX ADMIN — ISOSORBIDE MONONITRATE 30 MG: 30 TABLET, EXTENDED RELEASE ORAL at 08:03

## 2021-03-29 RX ADMIN — DOCUSATE SODIUM 100 MG: 50 LIQUID ORAL at 08:03

## 2021-03-29 RX ADMIN — ACETAMINOPHEN 1000 MG: 500 TABLET ORAL at 09:03

## 2021-03-29 RX ADMIN — CLEVIPIDINE 8 MG/HR: 0.5 EMULSION INTRAVENOUS at 02:03

## 2021-03-29 RX ADMIN — MILRINONE LACTATE IN DEXTROSE 0.38 MCG/KG/MIN: 200 INJECTION, SOLUTION INTRAVENOUS at 04:03

## 2021-03-29 RX ADMIN — AMIODARONE HYDROCHLORIDE 0.5 MG/MIN: 1.8 INJECTION, SOLUTION INTRAVENOUS at 03:03

## 2021-03-29 RX ADMIN — FAMOTIDINE 20 MG: 10 INJECTION INTRAVENOUS at 08:03

## 2021-03-29 RX ADMIN — AMIODARONE HYDROCHLORIDE 1 MG/MIN: 1.8 INJECTION, SOLUTION INTRAVENOUS at 10:03

## 2021-03-29 RX ADMIN — OXYCODONE HYDROCHLORIDE 10 MG: 10 TABLET ORAL at 04:03

## 2021-03-29 RX ADMIN — ISOSORBIDE MONONITRATE 30 MG: 30 TABLET, EXTENDED RELEASE ORAL at 06:03

## 2021-03-29 RX ADMIN — HYDROMORPHONE HYDROCHLORIDE 2 MG: 1 INJECTION, SOLUTION INTRAMUSCULAR; INTRAVENOUS; SUBCUTANEOUS at 12:03

## 2021-03-29 RX ADMIN — DIPHENHYDRAMINE HYDROCHLORIDE 25 MG: 25 CAPSULE ORAL at 07:03

## 2021-03-29 RX ADMIN — DOCUSATE SODIUM 100 MG: 50 LIQUID ORAL at 09:03

## 2021-03-29 RX ADMIN — MILRINONE LACTATE IN DEXTROSE 0.38 MCG/KG/MIN: 200 INJECTION, SOLUTION INTRAVENOUS at 08:03

## 2021-03-29 RX ADMIN — OXYCODONE HYDROCHLORIDE 10 MG: 10 TABLET ORAL at 12:03

## 2021-03-29 RX ADMIN — HYDROMORPHONE HYDROCHLORIDE 1 MG: 1 INJECTION, SOLUTION INTRAMUSCULAR; INTRAVENOUS; SUBCUTANEOUS at 05:03

## 2021-03-30 LAB
ALBUMIN SERPL BCP-MCNC: 3.6 G/DL (ref 3.5–5.2)
ALP SERPL-CCNC: 79 U/L (ref 55–135)
ALT SERPL W/O P-5'-P-CCNC: 8 U/L (ref 10–44)
ANION GAP SERPL CALC-SCNC: 15 MMOL/L (ref 8–16)
AST SERPL-CCNC: 33 U/L (ref 10–40)
BASOPHILS # BLD AUTO: 0.05 K/UL (ref 0–0.2)
BASOPHILS NFR BLD: 0.7 % (ref 0–1.9)
BILIRUB SERPL-MCNC: 1.2 MG/DL (ref 0.1–1)
BUN SERPL-MCNC: 32 MG/DL (ref 8–23)
CALCIUM SERPL-MCNC: 8.8 MG/DL (ref 8.7–10.5)
CHLORIDE SERPL-SCNC: 104 MMOL/L (ref 95–110)
CO2 SERPL-SCNC: 24 MMOL/L (ref 23–29)
CREAT SERPL-MCNC: 1.8 MG/DL (ref 0.5–1.4)
DIFFERENTIAL METHOD: ABNORMAL
EOSINOPHIL # BLD AUTO: 0.5 K/UL (ref 0–0.5)
EOSINOPHIL NFR BLD: 7.1 % (ref 0–8)
ERYTHROCYTE [DISTWIDTH] IN BLOOD BY AUTOMATED COUNT: 16.6 % (ref 11.5–14.5)
EST. GFR  (AFRICAN AMERICAN): 42.2 ML/MIN/1.73 M^2
EST. GFR  (NON AFRICAN AMERICAN): 36.5 ML/MIN/1.73 M^2
GLUCOSE SERPL-MCNC: 110 MG/DL (ref 70–110)
HCT VFR BLD AUTO: 32.9 % (ref 40–54)
HGB BLD-MCNC: 10.3 G/DL (ref 14–18)
IMM GRANULOCYTES # BLD AUTO: 0.04 K/UL (ref 0–0.04)
IMM GRANULOCYTES NFR BLD AUTO: 0.5 % (ref 0–0.5)
INR PPP: 1 (ref 0.8–1.2)
LYMPHOCYTES # BLD AUTO: 0.8 K/UL (ref 1–4.8)
LYMPHOCYTES NFR BLD: 10.2 % (ref 18–48)
MAGNESIUM SERPL-MCNC: 2.2 MG/DL (ref 1.6–2.6)
MCH RBC QN AUTO: 27.5 PG (ref 27–31)
MCHC RBC AUTO-ENTMCNC: 31.3 G/DL (ref 32–36)
MCV RBC AUTO: 88 FL (ref 82–98)
MONOCYTES # BLD AUTO: 0.6 K/UL (ref 0.3–1)
MONOCYTES NFR BLD: 7.6 % (ref 4–15)
NEUTROPHILS # BLD AUTO: 5.4 K/UL (ref 1.8–7.7)
NEUTROPHILS NFR BLD: 73.9 % (ref 38–73)
NRBC BLD-RTO: 0 /100 WBC
PHOSPHATE SERPL-MCNC: 3.9 MG/DL (ref 2.7–4.5)
PLATELET # BLD AUTO: 66 K/UL (ref 150–450)
PMV BLD AUTO: 12.3 FL (ref 9.2–12.9)
POCT GLUCOSE: 106 MG/DL (ref 70–110)
POCT GLUCOSE: 107 MG/DL (ref 70–110)
POCT GLUCOSE: 122 MG/DL (ref 70–110)
POCT GLUCOSE: 94 MG/DL (ref 70–110)
POTASSIUM SERPL-SCNC: 3.8 MMOL/L (ref 3.5–5.1)
POTASSIUM SERPL-SCNC: 4.2 MMOL/L (ref 3.5–5.1)
PROT SERPL-MCNC: 7 G/DL (ref 6–8.4)
PROTHROMBIN TIME: 10.9 SEC (ref 9–12.5)
RBC # BLD AUTO: 3.75 M/UL (ref 4.6–6.2)
SODIUM SERPL-SCNC: 143 MMOL/L (ref 136–145)
TRIGL SERPL-MCNC: 89 MG/DL (ref 30–150)
WBC # BLD AUTO: 7.36 K/UL (ref 3.9–12.7)

## 2021-03-30 PROCEDURE — 63600175 PHARM REV CODE 636 W HCPCS: Performed by: THORACIC SURGERY (CARDIOTHORACIC VASCULAR SURGERY)

## 2021-03-30 PROCEDURE — 97530 THERAPEUTIC ACTIVITIES: CPT

## 2021-03-30 PROCEDURE — 25000003 PHARM REV CODE 250: Performed by: STUDENT IN AN ORGANIZED HEALTH CARE EDUCATION/TRAINING PROGRAM

## 2021-03-30 PROCEDURE — 63600175 PHARM REV CODE 636 W HCPCS: Performed by: STUDENT IN AN ORGANIZED HEALTH CARE EDUCATION/TRAINING PROGRAM

## 2021-03-30 PROCEDURE — 25000003 PHARM REV CODE 250: Performed by: THORACIC SURGERY (CARDIOTHORACIC VASCULAR SURGERY)

## 2021-03-30 PROCEDURE — C9248 INJ, CLEVIDIPINE BUTYRATE: HCPCS | Performed by: THORACIC SURGERY (CARDIOTHORACIC VASCULAR SURGERY)

## 2021-03-30 PROCEDURE — 84132 ASSAY OF SERUM POTASSIUM: CPT | Performed by: STUDENT IN AN ORGANIZED HEALTH CARE EDUCATION/TRAINING PROGRAM

## 2021-03-30 PROCEDURE — 99233 SBSQ HOSP IP/OBS HIGH 50: CPT | Mod: ,,, | Performed by: SURGERY

## 2021-03-30 PROCEDURE — 85025 COMPLETE CBC W/AUTO DIFF WBC: CPT | Performed by: THORACIC SURGERY (CARDIOTHORACIC VASCULAR SURGERY)

## 2021-03-30 PROCEDURE — 84478 ASSAY OF TRIGLYCERIDES: CPT | Performed by: THORACIC SURGERY (CARDIOTHORACIC VASCULAR SURGERY)

## 2021-03-30 PROCEDURE — 80053 COMPREHEN METABOLIC PANEL: CPT | Performed by: STUDENT IN AN ORGANIZED HEALTH CARE EDUCATION/TRAINING PROGRAM

## 2021-03-30 PROCEDURE — 20000000 HC ICU ROOM

## 2021-03-30 PROCEDURE — 85610 PROTHROMBIN TIME: CPT | Performed by: THORACIC SURGERY (CARDIOTHORACIC VASCULAR SURGERY)

## 2021-03-30 PROCEDURE — 97535 SELF CARE MNGMENT TRAINING: CPT

## 2021-03-30 PROCEDURE — 83735 ASSAY OF MAGNESIUM: CPT | Performed by: THORACIC SURGERY (CARDIOTHORACIC VASCULAR SURGERY)

## 2021-03-30 PROCEDURE — 99233 PR SUBSEQUENT HOSPITAL CARE,LEVL III: ICD-10-PCS | Mod: ,,, | Performed by: SURGERY

## 2021-03-30 PROCEDURE — 97116 GAIT TRAINING THERAPY: CPT

## 2021-03-30 PROCEDURE — 84100 ASSAY OF PHOSPHORUS: CPT | Performed by: THORACIC SURGERY (CARDIOTHORACIC VASCULAR SURGERY)

## 2021-03-30 RX ORDER — FAMOTIDINE 20 MG/1
20 TABLET, FILM COATED ORAL DAILY
Status: DISCONTINUED | OUTPATIENT
Start: 2021-03-31 | End: 2021-04-02 | Stop reason: HOSPADM

## 2021-03-30 RX ORDER — AMIODARONE HYDROCHLORIDE 200 MG/1
400 TABLET ORAL 3 TIMES DAILY
Status: DISCONTINUED | OUTPATIENT
Start: 2021-03-30 | End: 2021-04-01

## 2021-03-30 RX ORDER — METOPROLOL SUCCINATE 50 MG/1
50 TABLET, EXTENDED RELEASE ORAL DAILY
Status: DISCONTINUED | OUTPATIENT
Start: 2021-03-30 | End: 2021-03-31

## 2021-03-30 RX ORDER — METOPROLOL TARTRATE 1 MG/ML
5 INJECTION, SOLUTION INTRAVENOUS ONCE
Status: COMPLETED | OUTPATIENT
Start: 2021-03-30 | End: 2021-03-30

## 2021-03-30 RX ORDER — ISOSORBIDE MONONITRATE 30 MG/1
120 TABLET, EXTENDED RELEASE ORAL DAILY
Status: DISCONTINUED | OUTPATIENT
Start: 2021-03-30 | End: 2021-03-31

## 2021-03-30 RX ORDER — HYDRALAZINE HYDROCHLORIDE 20 MG/ML
10 INJECTION INTRAMUSCULAR; INTRAVENOUS EVERY 8 HOURS PRN
Status: DISCONTINUED | OUTPATIENT
Start: 2021-03-30 | End: 2021-04-02 | Stop reason: HOSPADM

## 2021-03-30 RX ORDER — MILRINONE LACTATE 0.2 MG/ML
0.12 INJECTION, SOLUTION INTRAVENOUS CONTINUOUS
Status: DISCONTINUED | OUTPATIENT
Start: 2021-03-30 | End: 2021-04-01

## 2021-03-30 RX ADMIN — AMIODARONE HYDROCHLORIDE 0.5 MG/MIN: 1.8 INJECTION, SOLUTION INTRAVENOUS at 03:03

## 2021-03-30 RX ADMIN — OXYCODONE HYDROCHLORIDE 10 MG: 10 TABLET ORAL at 12:03

## 2021-03-30 RX ADMIN — METOROPROLOL TARTRATE 5 MG: 5 INJECTION, SOLUTION INTRAVENOUS at 06:03

## 2021-03-30 RX ADMIN — HYPROMELLOSE 2910 1 DROP: 5 SOLUTION OPHTHALMIC at 04:03

## 2021-03-30 RX ADMIN — POTASSIUM CHLORIDE 20 MEQ: 200 INJECTION, SOLUTION INTRAVENOUS at 11:03

## 2021-03-30 RX ADMIN — METOPROLOL SUCCINATE 50 MG: 50 TABLET, EXTENDED RELEASE ORAL at 08:03

## 2021-03-30 RX ADMIN — OXYCODONE HYDROCHLORIDE 10 MG: 10 TABLET ORAL at 09:03

## 2021-03-30 RX ADMIN — HYDRALAZINE HYDROCHLORIDE 10 MG: 20 INJECTION, SOLUTION INTRAMUSCULAR; INTRAVENOUS at 05:03

## 2021-03-30 RX ADMIN — AMIODARONE HYDROCHLORIDE 400 MG: 200 TABLET ORAL at 03:03

## 2021-03-30 RX ADMIN — ACETAMINOPHEN 1000 MG: 500 TABLET ORAL at 06:03

## 2021-03-30 RX ADMIN — DOCUSATE SODIUM 100 MG: 50 LIQUID ORAL at 08:03

## 2021-03-30 RX ADMIN — FUROSEMIDE 12.5 MG/HR: 10 INJECTION, SOLUTION INTRAMUSCULAR; INTRAVENOUS at 09:03

## 2021-03-30 RX ADMIN — OXYCODONE HYDROCHLORIDE 10 MG: 10 TABLET ORAL at 04:03

## 2021-03-30 RX ADMIN — MILRINONE LACTATE IN DEXTROSE 0.25 MCG/KG/MIN: 200 INJECTION, SOLUTION INTRAVENOUS at 09:03

## 2021-03-30 RX ADMIN — POLYETHYLENE GLYCOL 3350 17 G: 17 POWDER, FOR SOLUTION ORAL at 08:03

## 2021-03-30 RX ADMIN — HYPROMELLOSE 2910 1 DROP: 5 SOLUTION OPHTHALMIC at 08:03

## 2021-03-30 RX ADMIN — OXYCODONE HYDROCHLORIDE 10 MG: 10 TABLET ORAL at 08:03

## 2021-03-30 RX ADMIN — FAMOTIDINE 20 MG: 10 INJECTION INTRAVENOUS at 08:03

## 2021-03-30 RX ADMIN — ISOSORBIDE MONONITRATE 120 MG: 30 TABLET, EXTENDED RELEASE ORAL at 06:03

## 2021-03-30 RX ADMIN — HYPROMELLOSE 2910 1 DROP: 5 SOLUTION OPHTHALMIC at 05:03

## 2021-03-30 RX ADMIN — ATORVASTATIN CALCIUM 40 MG: 20 TABLET, FILM COATED ORAL at 08:03

## 2021-03-30 RX ADMIN — AMIODARONE HYDROCHLORIDE 400 MG: 200 TABLET ORAL at 08:03

## 2021-03-30 RX ADMIN — AMIODARONE HYDROCHLORIDE 400 MG: 200 TABLET ORAL at 06:03

## 2021-03-30 RX ADMIN — FUROSEMIDE 22.5 MG/HR: 10 INJECTION, SOLUTION INTRAMUSCULAR; INTRAVENOUS at 03:03

## 2021-03-30 RX ADMIN — MUPIROCIN: 20 OINTMENT TOPICAL at 08:03

## 2021-03-30 RX ADMIN — CLEVIPIDINE 1 MG/HR: 0.5 EMULSION INTRAVENOUS at 09:03

## 2021-03-30 RX ADMIN — MILRINONE LACTATE IN DEXTROSE 0.38 MCG/KG/MIN: 200 INJECTION, SOLUTION INTRAVENOUS at 02:03

## 2021-03-30 RX ADMIN — WARFARIN SODIUM 4 MG: 4 TABLET ORAL at 04:03

## 2021-03-30 RX ADMIN — ACETAMINOPHEN 1000 MG: 500 TABLET ORAL at 10:03

## 2021-03-30 RX ADMIN — ACETAMINOPHEN 1000 MG: 500 TABLET ORAL at 01:03

## 2021-03-31 LAB
ALBUMIN SERPL BCP-MCNC: 3.3 G/DL (ref 3.5–5.2)
ALP SERPL-CCNC: 75 U/L (ref 55–135)
ALT SERPL W/O P-5'-P-CCNC: 7 U/L (ref 10–44)
ANION GAP SERPL CALC-SCNC: 10 MMOL/L (ref 8–16)
AST SERPL-CCNC: 24 U/L (ref 10–40)
BASOPHILS # BLD AUTO: 0.06 K/UL (ref 0–0.2)
BASOPHILS NFR BLD: 0.9 % (ref 0–1.9)
BILIRUB SERPL-MCNC: 1.4 MG/DL (ref 0.1–1)
BUN SERPL-MCNC: 31 MG/DL (ref 8–23)
CALCIUM SERPL-MCNC: 8.2 MG/DL (ref 8.7–10.5)
CHLORIDE SERPL-SCNC: 104 MMOL/L (ref 95–110)
CO2 SERPL-SCNC: 27 MMOL/L (ref 23–29)
CREAT SERPL-MCNC: 1.6 MG/DL (ref 0.5–1.4)
DIFFERENTIAL METHOD: ABNORMAL
EOSINOPHIL # BLD AUTO: 0.5 K/UL (ref 0–0.5)
EOSINOPHIL NFR BLD: 7 % (ref 0–8)
ERYTHROCYTE [DISTWIDTH] IN BLOOD BY AUTOMATED COUNT: 15.9 % (ref 11.5–14.5)
EST. GFR  (AFRICAN AMERICAN): 48.7 ML/MIN/1.73 M^2
EST. GFR  (NON AFRICAN AMERICAN): 42.1 ML/MIN/1.73 M^2
GLUCOSE SERPL-MCNC: 99 MG/DL (ref 70–110)
HCT VFR BLD AUTO: 32.6 % (ref 40–54)
HGB BLD-MCNC: 10.9 G/DL (ref 14–18)
IMM GRANULOCYTES # BLD AUTO: 0.03 K/UL (ref 0–0.04)
IMM GRANULOCYTES NFR BLD AUTO: 0.5 % (ref 0–0.5)
INR PPP: 1.1 (ref 0.8–1.2)
LYMPHOCYTES # BLD AUTO: 0.8 K/UL (ref 1–4.8)
LYMPHOCYTES NFR BLD: 11.9 % (ref 18–48)
MAGNESIUM SERPL-MCNC: 2 MG/DL (ref 1.6–2.6)
MCH RBC QN AUTO: 28.5 PG (ref 27–31)
MCHC RBC AUTO-ENTMCNC: 33.4 G/DL (ref 32–36)
MCV RBC AUTO: 85 FL (ref 82–98)
MONOCYTES # BLD AUTO: 0.6 K/UL (ref 0.3–1)
MONOCYTES NFR BLD: 9.6 % (ref 4–15)
NEUTROPHILS # BLD AUTO: 4.5 K/UL (ref 1.8–7.7)
NEUTROPHILS NFR BLD: 70.1 % (ref 38–73)
NRBC BLD-RTO: 0 /100 WBC
PHOSPHATE SERPL-MCNC: 3.3 MG/DL (ref 2.7–4.5)
PLATELET # BLD AUTO: 64 K/UL (ref 150–450)
PMV BLD AUTO: 10.7 FL (ref 9.2–12.9)
POCT GLUCOSE: 111 MG/DL (ref 70–110)
POCT GLUCOSE: 113 MG/DL (ref 70–110)
POCT GLUCOSE: 126 MG/DL (ref 70–110)
POCT GLUCOSE: 127 MG/DL (ref 70–110)
POCT GLUCOSE: 99 MG/DL (ref 70–110)
POTASSIUM SERPL-SCNC: 3.7 MMOL/L (ref 3.5–5.1)
POTASSIUM SERPL-SCNC: 4.6 MMOL/L (ref 3.5–5.1)
PROT SERPL-MCNC: 6.4 G/DL (ref 6–8.4)
PROTHROMBIN TIME: 11.9 SEC (ref 9–12.5)
RBC # BLD AUTO: 3.82 M/UL (ref 4.6–6.2)
SODIUM SERPL-SCNC: 141 MMOL/L (ref 136–145)
WBC # BLD AUTO: 6.47 K/UL (ref 3.9–12.7)

## 2021-03-31 PROCEDURE — 25000003 PHARM REV CODE 250: Performed by: STUDENT IN AN ORGANIZED HEALTH CARE EDUCATION/TRAINING PROGRAM

## 2021-03-31 PROCEDURE — 84100 ASSAY OF PHOSPHORUS: CPT | Performed by: THORACIC SURGERY (CARDIOTHORACIC VASCULAR SURGERY)

## 2021-03-31 PROCEDURE — 25000003 PHARM REV CODE 250: Performed by: THORACIC SURGERY (CARDIOTHORACIC VASCULAR SURGERY)

## 2021-03-31 PROCEDURE — 20600001 HC STEP DOWN PRIVATE ROOM

## 2021-03-31 PROCEDURE — 63600175 PHARM REV CODE 636 W HCPCS: Performed by: THORACIC SURGERY (CARDIOTHORACIC VASCULAR SURGERY)

## 2021-03-31 PROCEDURE — 94761 N-INVAS EAR/PLS OXIMETRY MLT: CPT

## 2021-03-31 PROCEDURE — 63600175 PHARM REV CODE 636 W HCPCS: Performed by: STUDENT IN AN ORGANIZED HEALTH CARE EDUCATION/TRAINING PROGRAM

## 2021-03-31 PROCEDURE — 99233 PR SUBSEQUENT HOSPITAL CARE,LEVL III: ICD-10-PCS | Mod: ,,, | Performed by: SURGERY

## 2021-03-31 PROCEDURE — 99900035 HC TECH TIME PER 15 MIN (STAT)

## 2021-03-31 PROCEDURE — 85025 COMPLETE CBC W/AUTO DIFF WBC: CPT | Performed by: THORACIC SURGERY (CARDIOTHORACIC VASCULAR SURGERY)

## 2021-03-31 PROCEDURE — C9248 INJ, CLEVIDIPINE BUTYRATE: HCPCS | Performed by: THORACIC SURGERY (CARDIOTHORACIC VASCULAR SURGERY)

## 2021-03-31 PROCEDURE — 99233 SBSQ HOSP IP/OBS HIGH 50: CPT | Mod: ,,, | Performed by: SURGERY

## 2021-03-31 PROCEDURE — 97116 GAIT TRAINING THERAPY: CPT

## 2021-03-31 PROCEDURE — 84132 ASSAY OF SERUM POTASSIUM: CPT | Performed by: STUDENT IN AN ORGANIZED HEALTH CARE EDUCATION/TRAINING PROGRAM

## 2021-03-31 PROCEDURE — 85610 PROTHROMBIN TIME: CPT | Performed by: THORACIC SURGERY (CARDIOTHORACIC VASCULAR SURGERY)

## 2021-03-31 PROCEDURE — 83735 ASSAY OF MAGNESIUM: CPT | Performed by: THORACIC SURGERY (CARDIOTHORACIC VASCULAR SURGERY)

## 2021-03-31 PROCEDURE — 80053 COMPREHEN METABOLIC PANEL: CPT | Performed by: THORACIC SURGERY (CARDIOTHORACIC VASCULAR SURGERY)

## 2021-03-31 RX ORDER — INSULIN ASPART 100 [IU]/ML
0-5 INJECTION, SOLUTION INTRAVENOUS; SUBCUTANEOUS
Status: DISCONTINUED | OUTPATIENT
Start: 2021-03-31 | End: 2021-04-02 | Stop reason: HOSPADM

## 2021-03-31 RX ORDER — IBUPROFEN 200 MG
24 TABLET ORAL
Status: DISCONTINUED | OUTPATIENT
Start: 2021-03-31 | End: 2021-04-02 | Stop reason: HOSPADM

## 2021-03-31 RX ORDER — METOPROLOL SUCCINATE 50 MG/1
100 TABLET, EXTENDED RELEASE ORAL DAILY
Status: DISCONTINUED | OUTPATIENT
Start: 2021-03-31 | End: 2021-03-31

## 2021-03-31 RX ORDER — TAMSULOSIN HYDROCHLORIDE 0.4 MG/1
0.4 CAPSULE ORAL DAILY
Status: DISCONTINUED | OUTPATIENT
Start: 2021-03-31 | End: 2021-04-02 | Stop reason: HOSPADM

## 2021-03-31 RX ORDER — METOPROLOL SUCCINATE 100 MG/1
100 TABLET, EXTENDED RELEASE ORAL DAILY
Status: DISCONTINUED | OUTPATIENT
Start: 2021-03-31 | End: 2021-03-31

## 2021-03-31 RX ORDER — GLUCAGON 1 MG
1 KIT INJECTION
Status: DISCONTINUED | OUTPATIENT
Start: 2021-03-31 | End: 2021-04-02 | Stop reason: HOSPADM

## 2021-03-31 RX ORDER — AMLODIPINE BESYLATE 5 MG/1
5 TABLET ORAL DAILY
Status: DISCONTINUED | OUTPATIENT
Start: 2021-03-31 | End: 2021-04-01

## 2021-03-31 RX ORDER — ISOSORBIDE MONONITRATE 60 MG/1
120 TABLET, EXTENDED RELEASE ORAL DAILY
Status: DISCONTINUED | OUTPATIENT
Start: 2021-03-31 | End: 2021-04-02 | Stop reason: HOSPADM

## 2021-03-31 RX ORDER — POTASSIUM CHLORIDE 20 MEQ/1
20 TABLET, EXTENDED RELEASE ORAL DAILY
Status: DISCONTINUED | OUTPATIENT
Start: 2021-03-31 | End: 2021-04-02 | Stop reason: HOSPADM

## 2021-03-31 RX ORDER — IBUPROFEN 200 MG
16 TABLET ORAL
Status: DISCONTINUED | OUTPATIENT
Start: 2021-03-31 | End: 2021-04-02 | Stop reason: HOSPADM

## 2021-03-31 RX ORDER — FUROSEMIDE 10 MG/ML
40 INJECTION INTRAMUSCULAR; INTRAVENOUS 2 TIMES DAILY
Status: DISCONTINUED | OUTPATIENT
Start: 2021-03-31 | End: 2021-04-02

## 2021-03-31 RX ORDER — ASPIRIN 81 MG/1
81 TABLET ORAL DAILY
Status: DISCONTINUED | OUTPATIENT
Start: 2021-03-31 | End: 2021-04-02 | Stop reason: HOSPADM

## 2021-03-31 RX ORDER — ALBUMIN HUMAN 50 G/1000ML
25 SOLUTION INTRAVENOUS ONCE
Status: DISCONTINUED | OUTPATIENT
Start: 2021-03-31 | End: 2021-03-31

## 2021-03-31 RX ADMIN — OXYCODONE HYDROCHLORIDE 10 MG: 10 TABLET ORAL at 07:03

## 2021-03-31 RX ADMIN — AMLODIPINE BESYLATE 5 MG: 5 TABLET ORAL at 08:03

## 2021-03-31 RX ADMIN — AMIODARONE HYDROCHLORIDE 400 MG: 200 TABLET ORAL at 08:03

## 2021-03-31 RX ADMIN — MILRINONE LACTATE IN DEXTROSE 0.12 MCG/KG/MIN: 200 INJECTION, SOLUTION INTRAVENOUS at 07:03

## 2021-03-31 RX ADMIN — DOCUSATE SODIUM 100 MG: 50 LIQUID ORAL at 09:03

## 2021-03-31 RX ADMIN — WARFARIN SODIUM 4 MG: 4 TABLET ORAL at 05:03

## 2021-03-31 RX ADMIN — DOCUSATE SODIUM 100 MG: 50 LIQUID ORAL at 08:03

## 2021-03-31 RX ADMIN — ISOSORBIDE MONONITRATE 120 MG: 60 TABLET, EXTENDED RELEASE ORAL at 06:03

## 2021-03-31 RX ADMIN — MILRINONE LACTATE IN DEXTROSE 0.25 MCG/KG/MIN: 200 INJECTION, SOLUTION INTRAVENOUS at 12:03

## 2021-03-31 RX ADMIN — ASPIRIN 81 MG: 81 TABLET, COATED ORAL at 08:03

## 2021-03-31 RX ADMIN — FUROSEMIDE 40 MG: 10 INJECTION, SOLUTION INTRAMUSCULAR; INTRAVENOUS at 08:03

## 2021-03-31 RX ADMIN — DIPHENHYDRAMINE HYDROCHLORIDE 25 MG: 25 CAPSULE ORAL at 06:03

## 2021-03-31 RX ADMIN — ACETAMINOPHEN 1000 MG: 500 TABLET ORAL at 07:03

## 2021-03-31 RX ADMIN — TAMSULOSIN HYDROCHLORIDE 0.4 MG: 0.4 CAPSULE ORAL at 08:03

## 2021-03-31 RX ADMIN — ACETAMINOPHEN 1000 MG: 500 TABLET ORAL at 02:03

## 2021-03-31 RX ADMIN — MUPIROCIN: 20 OINTMENT TOPICAL at 08:03

## 2021-03-31 RX ADMIN — AMIODARONE HYDROCHLORIDE 400 MG: 200 TABLET ORAL at 09:03

## 2021-03-31 RX ADMIN — POLYETHYLENE GLYCOL 3350 17 G: 17 POWDER, FOR SOLUTION ORAL at 08:03

## 2021-03-31 RX ADMIN — FAMOTIDINE 20 MG: 20 TABLET ORAL at 08:03

## 2021-03-31 RX ADMIN — AMIODARONE HYDROCHLORIDE 400 MG: 200 TABLET ORAL at 02:03

## 2021-03-31 RX ADMIN — ACETAMINOPHEN 1000 MG: 500 TABLET ORAL at 09:03

## 2021-03-31 RX ADMIN — FUROSEMIDE 40 MG: 10 INJECTION, SOLUTION INTRAMUSCULAR; INTRAVENOUS at 05:03

## 2021-03-31 RX ADMIN — METOPROLOL SUCCINATE 100 MG: 100 TABLET, EXTENDED RELEASE ORAL at 07:03

## 2021-03-31 RX ADMIN — ATORVASTATIN CALCIUM 40 MG: 20 TABLET, FILM COATED ORAL at 09:03

## 2021-03-31 RX ADMIN — OXYCODONE HYDROCHLORIDE 10 MG: 10 TABLET ORAL at 02:03

## 2021-03-31 RX ADMIN — CLEVIPIDINE 2 MG/HR: 0.5 EMULSION INTRAVENOUS at 04:03

## 2021-04-01 PROBLEM — Z98.890 S/P TVR (TRICUSPID VALVE REPAIR): Status: ACTIVE | Noted: 2021-04-01

## 2021-04-01 PROBLEM — Z86.79 S/P MAZE OPERATION FOR ATRIAL FIBRILLATION: Status: ACTIVE | Noted: 2021-04-01

## 2021-04-01 PROBLEM — Z95.2 S/P AVR (AORTIC VALVE REPLACEMENT): Status: ACTIVE | Noted: 2021-04-01

## 2021-04-01 PROBLEM — D62 ACUTE BLOOD LOSS ANEMIA: Status: ACTIVE | Noted: 2021-04-01

## 2021-04-01 PROBLEM — D69.6 THROMBOCYTOPENIA: Status: ACTIVE | Noted: 2021-04-01

## 2021-04-01 PROBLEM — Z98.890 S/P MAZE OPERATION FOR ATRIAL FIBRILLATION: Status: ACTIVE | Noted: 2021-04-01

## 2021-04-01 LAB
ALBUMIN SERPL BCP-MCNC: 3.6 G/DL (ref 3.5–5.2)
ALP SERPL-CCNC: 90 U/L (ref 55–135)
ALT SERPL W/O P-5'-P-CCNC: 8 U/L (ref 10–44)
ANION GAP SERPL CALC-SCNC: 12 MMOL/L (ref 8–16)
AST SERPL-CCNC: 20 U/L (ref 10–40)
BASOPHILS # BLD AUTO: 0.05 K/UL (ref 0–0.2)
BASOPHILS NFR BLD: 0.7 % (ref 0–1.9)
BILIRUB SERPL-MCNC: 1.4 MG/DL (ref 0.1–1)
BUN SERPL-MCNC: 35 MG/DL (ref 8–23)
CALCIUM SERPL-MCNC: 8.9 MG/DL (ref 8.7–10.5)
CHLORIDE SERPL-SCNC: 103 MMOL/L (ref 95–110)
CO2 SERPL-SCNC: 27 MMOL/L (ref 23–29)
CREAT SERPL-MCNC: 1.6 MG/DL (ref 0.5–1.4)
DIFFERENTIAL METHOD: ABNORMAL
EOSINOPHIL # BLD AUTO: 0.7 K/UL (ref 0–0.5)
EOSINOPHIL NFR BLD: 10.8 % (ref 0–8)
ERYTHROCYTE [DISTWIDTH] IN BLOOD BY AUTOMATED COUNT: 15.4 % (ref 11.5–14.5)
EST. GFR  (AFRICAN AMERICAN): 48.7 ML/MIN/1.73 M^2
EST. GFR  (NON AFRICAN AMERICAN): 42.1 ML/MIN/1.73 M^2
GLUCOSE SERPL-MCNC: 109 MG/DL (ref 70–110)
HCT VFR BLD AUTO: 34.8 % (ref 40–54)
HGB BLD-MCNC: 11.1 G/DL (ref 14–18)
IMM GRANULOCYTES # BLD AUTO: 0.02 K/UL (ref 0–0.04)
IMM GRANULOCYTES NFR BLD AUTO: 0.3 % (ref 0–0.5)
INR PPP: 1.4 (ref 0.8–1.2)
LYMPHOCYTES # BLD AUTO: 1.1 K/UL (ref 1–4.8)
LYMPHOCYTES NFR BLD: 15.8 % (ref 18–48)
MAGNESIUM SERPL-MCNC: 2.3 MG/DL (ref 1.6–2.6)
MCH RBC QN AUTO: 27.5 PG (ref 27–31)
MCHC RBC AUTO-ENTMCNC: 31.9 G/DL (ref 32–36)
MCV RBC AUTO: 86 FL (ref 82–98)
MONOCYTES # BLD AUTO: 0.6 K/UL (ref 0.3–1)
MONOCYTES NFR BLD: 9.6 % (ref 4–15)
NEUTROPHILS # BLD AUTO: 4.2 K/UL (ref 1.8–7.7)
NEUTROPHILS NFR BLD: 62.8 % (ref 38–73)
NRBC BLD-RTO: 0 /100 WBC
PHOSPHATE SERPL-MCNC: 4.2 MG/DL (ref 2.7–4.5)
PLATELET # BLD AUTO: 77 K/UL (ref 150–450)
PMV BLD AUTO: 10.8 FL (ref 9.2–12.9)
POCT GLUCOSE: 103 MG/DL (ref 70–110)
POCT GLUCOSE: 140 MG/DL (ref 70–110)
POCT GLUCOSE: 140 MG/DL (ref 70–110)
POCT GLUCOSE: 88 MG/DL (ref 70–110)
POTASSIUM SERPL-SCNC: 4.3 MMOL/L (ref 3.5–5.1)
PROT SERPL-MCNC: 7 G/DL (ref 6–8.4)
PROTHROMBIN TIME: 15.2 SEC (ref 9–12.5)
RBC # BLD AUTO: 4.04 M/UL (ref 4.6–6.2)
SODIUM SERPL-SCNC: 142 MMOL/L (ref 136–145)
WBC # BLD AUTO: 6.69 K/UL (ref 3.9–12.7)

## 2021-04-01 PROCEDURE — 80053 COMPREHEN METABOLIC PANEL: CPT | Performed by: PHYSICIAN ASSISTANT

## 2021-04-01 PROCEDURE — 25000003 PHARM REV CODE 250: Performed by: STUDENT IN AN ORGANIZED HEALTH CARE EDUCATION/TRAINING PROGRAM

## 2021-04-01 PROCEDURE — 85610 PROTHROMBIN TIME: CPT | Performed by: PHYSICIAN ASSISTANT

## 2021-04-01 PROCEDURE — 83735 ASSAY OF MAGNESIUM: CPT | Performed by: PHYSICIAN ASSISTANT

## 2021-04-01 PROCEDURE — 36415 COLL VENOUS BLD VENIPUNCTURE: CPT | Performed by: PHYSICIAN ASSISTANT

## 2021-04-01 PROCEDURE — 94761 N-INVAS EAR/PLS OXIMETRY MLT: CPT

## 2021-04-01 PROCEDURE — 92526 ORAL FUNCTION THERAPY: CPT

## 2021-04-01 PROCEDURE — 25000003 PHARM REV CODE 250: Performed by: THORACIC SURGERY (CARDIOTHORACIC VASCULAR SURGERY)

## 2021-04-01 PROCEDURE — 20600001 HC STEP DOWN PRIVATE ROOM

## 2021-04-01 PROCEDURE — 85025 COMPLETE CBC W/AUTO DIFF WBC: CPT | Performed by: PHYSICIAN ASSISTANT

## 2021-04-01 PROCEDURE — 84100 ASSAY OF PHOSPHORUS: CPT | Performed by: PHYSICIAN ASSISTANT

## 2021-04-01 PROCEDURE — 63600175 PHARM REV CODE 636 W HCPCS: Performed by: STUDENT IN AN ORGANIZED HEALTH CARE EDUCATION/TRAINING PROGRAM

## 2021-04-01 RX ORDER — OXYCODONE HYDROCHLORIDE 5 MG/1
5 TABLET ORAL EVERY 4 HOURS PRN
Qty: 42 TABLET | Refills: 0 | Status: SHIPPED | OUTPATIENT
Start: 2021-04-01 | End: 2021-04-08

## 2021-04-01 RX ORDER — ACETAMINOPHEN 500 MG
1000 TABLET ORAL EVERY 6 HOURS PRN
Qty: 30 TABLET | Refills: 0 | Status: ON HOLD | OUTPATIENT
Start: 2021-04-01 | End: 2021-06-08 | Stop reason: HOSPADM

## 2021-04-01 RX ORDER — WARFARIN 4 MG/1
4 TABLET ORAL DAILY
Qty: 30 TABLET | Refills: 11 | Status: ON HOLD | OUTPATIENT
Start: 2021-04-01 | End: 2021-05-13 | Stop reason: HOSPADM

## 2021-04-01 RX ORDER — DOCUSATE SODIUM 50 MG/5ML
100 LIQUID ORAL 2 TIMES DAILY PRN
Qty: 473 ML | Refills: 0 | Status: CANCELLED | OUTPATIENT
Start: 2021-04-01

## 2021-04-01 RX ORDER — ISOSORBIDE MONONITRATE 120 MG/1
120 TABLET, EXTENDED RELEASE ORAL DAILY
Qty: 30 TABLET | Refills: 11 | Status: ON HOLD | OUTPATIENT
Start: 2021-04-02 | End: 2021-05-13 | Stop reason: HOSPADM

## 2021-04-01 RX ORDER — LISINOPRIL 10 MG/1
10 TABLET ORAL DAILY
Qty: 90 TABLET | Refills: 3 | Status: CANCELLED | OUTPATIENT
Start: 2021-04-02 | End: 2022-04-02

## 2021-04-01 RX ORDER — ISOSORBIDE MONONITRATE 120 MG/1
120 TABLET, EXTENDED RELEASE ORAL DAILY
Qty: 30 TABLET | Refills: 11 | Status: CANCELLED | OUTPATIENT
Start: 2021-04-02 | End: 2022-04-02

## 2021-04-01 RX ORDER — AMIODARONE HYDROCHLORIDE 200 MG/1
400 TABLET ORAL 2 TIMES DAILY
Status: DISCONTINUED | OUTPATIENT
Start: 2021-04-01 | End: 2021-04-02 | Stop reason: HOSPADM

## 2021-04-01 RX ORDER — ACETAMINOPHEN 500 MG
1000 TABLET ORAL EVERY 6 HOURS PRN
Qty: 30 TABLET | Refills: 0 | Status: CANCELLED | OUTPATIENT
Start: 2021-04-01

## 2021-04-01 RX ORDER — AMIODARONE HYDROCHLORIDE 400 MG/1
TABLET ORAL
Qty: 60 TABLET | Refills: 11 | Status: CANCELLED | OUTPATIENT
Start: 2021-04-01

## 2021-04-01 RX ORDER — POLYETHYLENE GLYCOL 3350 17 G/17G
17 POWDER, FOR SOLUTION ORAL 2 TIMES DAILY PRN
Qty: 10 EACH | Refills: 0 | Status: CANCELLED | OUTPATIENT
Start: 2021-04-01

## 2021-04-01 RX ORDER — POLYETHYLENE GLYCOL 3350 17 G/17G
17 POWDER, FOR SOLUTION ORAL 2 TIMES DAILY PRN
Qty: 10 EACH | Refills: 0 | Status: SHIPPED | OUTPATIENT
Start: 2021-04-01

## 2021-04-01 RX ORDER — AMIODARONE HYDROCHLORIDE 400 MG/1
TABLET ORAL
Qty: 60 TABLET | Refills: 2 | Status: ON HOLD | OUTPATIENT
Start: 2021-04-01 | End: 2021-05-13 | Stop reason: HOSPADM

## 2021-04-01 RX ORDER — ATORVASTATIN CALCIUM 40 MG/1
40 TABLET, FILM COATED ORAL NIGHTLY
Qty: 90 TABLET | Refills: 3 | Status: ON HOLD | OUTPATIENT
Start: 2021-04-01 | End: 2021-06-08 | Stop reason: SDUPTHER

## 2021-04-01 RX ORDER — LISINOPRIL 10 MG/1
10 TABLET ORAL DAILY
Qty: 90 TABLET | Refills: 3 | Status: ON HOLD | OUTPATIENT
Start: 2021-04-02 | End: 2021-05-13 | Stop reason: HOSPADM

## 2021-04-01 RX ORDER — ATORVASTATIN CALCIUM 40 MG/1
40 TABLET, FILM COATED ORAL NIGHTLY
Qty: 90 TABLET | Refills: 3 | Status: CANCELLED | OUTPATIENT
Start: 2021-04-01 | End: 2022-04-01

## 2021-04-01 RX ORDER — OXYCODONE HYDROCHLORIDE 5 MG/1
5 TABLET ORAL EVERY 4 HOURS PRN
Qty: 42 TABLET | Refills: 0 | Status: CANCELLED | OUTPATIENT
Start: 2021-04-01 | End: 2021-04-08

## 2021-04-01 RX ORDER — TAMSULOSIN HYDROCHLORIDE 0.4 MG/1
0.4 CAPSULE ORAL DAILY
Qty: 30 CAPSULE | Refills: 11 | Status: SHIPPED | OUTPATIENT
Start: 2021-04-02 | End: 2022-04-02

## 2021-04-01 RX ORDER — DOCUSATE SODIUM 50 MG/5ML
100 LIQUID ORAL 2 TIMES DAILY PRN
Qty: 473 ML | Refills: 0 | Status: SHIPPED | OUTPATIENT
Start: 2021-04-01 | End: 2021-04-01

## 2021-04-01 RX ORDER — ATORVASTATIN CALCIUM 40 MG/1
40 TABLET, FILM COATED ORAL NIGHTLY
Qty: 90 TABLET | Refills: 3 | Status: SHIPPED | OUTPATIENT
Start: 2021-04-01 | End: 2021-04-01

## 2021-04-01 RX ORDER — LISINOPRIL 10 MG/1
10 TABLET ORAL DAILY
Status: DISCONTINUED | OUTPATIENT
Start: 2021-04-01 | End: 2021-04-02 | Stop reason: HOSPADM

## 2021-04-01 RX ORDER — TAMSULOSIN HYDROCHLORIDE 0.4 MG/1
0.4 CAPSULE ORAL DAILY
Qty: 30 CAPSULE | Refills: 11 | Status: CANCELLED | OUTPATIENT
Start: 2021-04-02 | End: 2022-04-02

## 2021-04-01 RX ORDER — WARFARIN 4 MG/1
4 TABLET ORAL DAILY
Qty: 30 TABLET | Refills: 11 | Status: CANCELLED | OUTPATIENT
Start: 2021-04-01 | End: 2022-04-01

## 2021-04-01 RX ORDER — DOCUSATE SODIUM 50 MG/5ML
100 LIQUID ORAL 2 TIMES DAILY PRN
Qty: 473 ML | Refills: 0 | Status: SHIPPED | OUTPATIENT
Start: 2021-04-01

## 2021-04-01 RX ADMIN — AMIODARONE HYDROCHLORIDE 400 MG: 200 TABLET ORAL at 09:04

## 2021-04-01 RX ADMIN — OXYCODONE HYDROCHLORIDE 10 MG: 10 TABLET ORAL at 11:04

## 2021-04-01 RX ADMIN — POLYETHYLENE GLYCOL 3350 17 G: 17 POWDER, FOR SOLUTION ORAL at 09:04

## 2021-04-01 RX ADMIN — FUROSEMIDE 40 MG: 10 INJECTION, SOLUTION INTRAMUSCULAR; INTRAVENOUS at 09:04

## 2021-04-01 RX ADMIN — ATORVASTATIN CALCIUM 40 MG: 20 TABLET, FILM COATED ORAL at 09:04

## 2021-04-01 RX ADMIN — ASPIRIN 81 MG: 81 TABLET, COATED ORAL at 09:04

## 2021-04-01 RX ADMIN — HYPROMELLOSE 2910 1 DROP: 5 SOLUTION OPHTHALMIC at 01:04

## 2021-04-01 RX ADMIN — WARFARIN SODIUM 4 MG: 4 TABLET ORAL at 05:04

## 2021-04-01 RX ADMIN — DOCUSATE SODIUM 100 MG: 50 LIQUID ORAL at 09:04

## 2021-04-01 RX ADMIN — LISINOPRIL 10 MG: 10 TABLET ORAL at 06:04

## 2021-04-01 RX ADMIN — ACETAMINOPHEN 1000 MG: 500 TABLET ORAL at 09:04

## 2021-04-01 RX ADMIN — FUROSEMIDE 40 MG: 10 INJECTION, SOLUTION INTRAMUSCULAR; INTRAVENOUS at 05:04

## 2021-04-01 RX ADMIN — ISOSORBIDE MONONITRATE 120 MG: 60 TABLET, EXTENDED RELEASE ORAL at 09:04

## 2021-04-01 RX ADMIN — OXYCODONE HYDROCHLORIDE 10 MG: 10 TABLET ORAL at 09:04

## 2021-04-01 RX ADMIN — OXYCODONE HYDROCHLORIDE 10 MG: 10 TABLET ORAL at 01:04

## 2021-04-01 RX ADMIN — TAMSULOSIN HYDROCHLORIDE 0.4 MG: 0.4 CAPSULE ORAL at 09:04

## 2021-04-01 RX ADMIN — POTASSIUM CHLORIDE 20 MEQ: 1500 TABLET, EXTENDED RELEASE ORAL at 09:04

## 2021-04-01 RX ADMIN — OXYCODONE HYDROCHLORIDE 10 MG: 10 TABLET ORAL at 05:04

## 2021-04-01 RX ADMIN — ACETAMINOPHEN 1000 MG: 500 TABLET ORAL at 06:04

## 2021-04-01 RX ADMIN — FAMOTIDINE 20 MG: 20 TABLET ORAL at 09:04

## 2021-04-01 RX ADMIN — OXYCODONE HYDROCHLORIDE 10 MG: 10 TABLET ORAL at 12:04

## 2021-04-02 VITALS
HEIGHT: 70 IN | BODY MASS INDEX: 29.7 KG/M2 | OXYGEN SATURATION: 99 % | TEMPERATURE: 98 F | RESPIRATION RATE: 22 BRPM | WEIGHT: 207.44 LBS | HEART RATE: 92 BPM | SYSTOLIC BLOOD PRESSURE: 123 MMHG | DIASTOLIC BLOOD PRESSURE: 59 MMHG

## 2021-04-02 LAB
ALBUMIN SERPL BCP-MCNC: 3.3 G/DL (ref 3.5–5.2)
ALP SERPL-CCNC: 98 U/L (ref 55–135)
ALT SERPL W/O P-5'-P-CCNC: 6 U/L (ref 10–44)
ANION GAP SERPL CALC-SCNC: 10 MMOL/L (ref 8–16)
AST SERPL-CCNC: 21 U/L (ref 10–40)
BASOPHILS # BLD AUTO: 0.06 K/UL (ref 0–0.2)
BASOPHILS NFR BLD: 0.9 % (ref 0–1.9)
BILIRUB SERPL-MCNC: 1.5 MG/DL (ref 0.1–1)
BUN SERPL-MCNC: 31 MG/DL (ref 8–23)
CALCIUM SERPL-MCNC: 8.7 MG/DL (ref 8.7–10.5)
CHLORIDE SERPL-SCNC: 103 MMOL/L (ref 95–110)
CO2 SERPL-SCNC: 29 MMOL/L (ref 23–29)
CREAT SERPL-MCNC: 1.5 MG/DL (ref 0.5–1.4)
DIFFERENTIAL METHOD: ABNORMAL
EOSINOPHIL # BLD AUTO: 0.4 K/UL (ref 0–0.5)
EOSINOPHIL NFR BLD: 6.4 % (ref 0–8)
ERYTHROCYTE [DISTWIDTH] IN BLOOD BY AUTOMATED COUNT: 15.3 % (ref 11.5–14.5)
EST. GFR  (AFRICAN AMERICAN): 52.6 ML/MIN/1.73 M^2
EST. GFR  (NON AFRICAN AMERICAN): 45.5 ML/MIN/1.73 M^2
GLUCOSE SERPL-MCNC: 107 MG/DL (ref 70–110)
HCT VFR BLD AUTO: 32.8 % (ref 40–54)
HGB BLD-MCNC: 10.5 G/DL (ref 14–18)
IMM GRANULOCYTES # BLD AUTO: 0.04 K/UL (ref 0–0.04)
IMM GRANULOCYTES NFR BLD AUTO: 0.6 % (ref 0–0.5)
INR PPP: 2.1 (ref 0.8–1.2)
LYMPHOCYTES # BLD AUTO: 0.9 K/UL (ref 1–4.8)
LYMPHOCYTES NFR BLD: 13.5 % (ref 18–48)
MAGNESIUM SERPL-MCNC: 2.2 MG/DL (ref 1.6–2.6)
MCH RBC QN AUTO: 27.9 PG (ref 27–31)
MCHC RBC AUTO-ENTMCNC: 32 G/DL (ref 32–36)
MCV RBC AUTO: 87 FL (ref 82–98)
MONOCYTES # BLD AUTO: 0.6 K/UL (ref 0.3–1)
MONOCYTES NFR BLD: 9.3 % (ref 4–15)
NEUTROPHILS # BLD AUTO: 4.8 K/UL (ref 1.8–7.7)
NEUTROPHILS NFR BLD: 69.3 % (ref 38–73)
NRBC BLD-RTO: 0 /100 WBC
PHOSPHATE SERPL-MCNC: 3.9 MG/DL (ref 2.7–4.5)
PLATELET # BLD AUTO: 92 K/UL (ref 150–450)
PMV BLD AUTO: 11.7 FL (ref 9.2–12.9)
POCT GLUCOSE: 131 MG/DL (ref 70–110)
POCT GLUCOSE: 150 MG/DL (ref 70–110)
POCT GLUCOSE: 97 MG/DL (ref 70–110)
POTASSIUM SERPL-SCNC: 4 MMOL/L (ref 3.5–5.1)
PROT SERPL-MCNC: 6.8 G/DL (ref 6–8.4)
PROTHROMBIN TIME: 22.4 SEC (ref 9–12.5)
RBC # BLD AUTO: 3.76 M/UL (ref 4.6–6.2)
SODIUM SERPL-SCNC: 142 MMOL/L (ref 136–145)
TRIGL SERPL-MCNC: 119 MG/DL (ref 30–150)
WBC # BLD AUTO: 6.88 K/UL (ref 3.9–12.7)

## 2021-04-02 PROCEDURE — 25000003 PHARM REV CODE 250: Performed by: STUDENT IN AN ORGANIZED HEALTH CARE EDUCATION/TRAINING PROGRAM

## 2021-04-02 PROCEDURE — 25000003 PHARM REV CODE 250: Performed by: THORACIC SURGERY (CARDIOTHORACIC VASCULAR SURGERY)

## 2021-04-02 PROCEDURE — 36415 COLL VENOUS BLD VENIPUNCTURE: CPT | Performed by: STUDENT IN AN ORGANIZED HEALTH CARE EDUCATION/TRAINING PROGRAM

## 2021-04-02 PROCEDURE — 85025 COMPLETE CBC W/AUTO DIFF WBC: CPT | Performed by: PHYSICIAN ASSISTANT

## 2021-04-02 PROCEDURE — 80053 COMPREHEN METABOLIC PANEL: CPT | Performed by: PHYSICIAN ASSISTANT

## 2021-04-02 PROCEDURE — 83735 ASSAY OF MAGNESIUM: CPT | Performed by: PHYSICIAN ASSISTANT

## 2021-04-02 PROCEDURE — 84478 ASSAY OF TRIGLYCERIDES: CPT | Performed by: STUDENT IN AN ORGANIZED HEALTH CARE EDUCATION/TRAINING PROGRAM

## 2021-04-02 PROCEDURE — 84100 ASSAY OF PHOSPHORUS: CPT | Performed by: PHYSICIAN ASSISTANT

## 2021-04-02 PROCEDURE — 63600175 PHARM REV CODE 636 W HCPCS: Performed by: STUDENT IN AN ORGANIZED HEALTH CARE EDUCATION/TRAINING PROGRAM

## 2021-04-02 PROCEDURE — 85610 PROTHROMBIN TIME: CPT | Performed by: PHYSICIAN ASSISTANT

## 2021-04-02 RX ORDER — FUROSEMIDE 80 MG/1
80 TABLET ORAL DAILY
Qty: 10 TABLET | Refills: 0 | Status: ON HOLD | OUTPATIENT
Start: 2021-04-02 | End: 2021-05-13

## 2021-04-02 RX ORDER — METOPROLOL SUCCINATE 100 MG/1
100 TABLET, EXTENDED RELEASE ORAL DAILY
Qty: 30 TABLET | Refills: 11 | Status: SHIPPED | OUTPATIENT
Start: 2021-04-03 | End: 2021-04-02

## 2021-04-02 RX ORDER — POTASSIUM CHLORIDE 20 MEQ/1
20 TABLET, EXTENDED RELEASE ORAL 3 TIMES DAILY
Qty: 30 TABLET | Refills: 0 | Status: SHIPPED | OUTPATIENT
Start: 2021-04-02 | End: 2021-04-02

## 2021-04-02 RX ORDER — FUROSEMIDE 10 MG/ML
80 INJECTION INTRAMUSCULAR; INTRAVENOUS DAILY
Qty: 80 ML | Refills: 0 | Status: SHIPPED | OUTPATIENT
Start: 2021-04-02 | End: 2021-04-02 | Stop reason: HOSPADM

## 2021-04-02 RX ORDER — METOPROLOL SUCCINATE 50 MG/1
100 TABLET, EXTENDED RELEASE ORAL DAILY
Status: DISCONTINUED | OUTPATIENT
Start: 2021-04-02 | End: 2021-04-02 | Stop reason: HOSPADM

## 2021-04-02 RX ORDER — FUROSEMIDE 40 MG/1
80 TABLET ORAL DAILY
Status: DISCONTINUED | OUTPATIENT
Start: 2021-04-02 | End: 2021-04-02 | Stop reason: HOSPADM

## 2021-04-02 RX ORDER — POTASSIUM CHLORIDE 20 MEQ/1
20 TABLET, EXTENDED RELEASE ORAL 3 TIMES DAILY
Qty: 30 TABLET | Refills: 0 | Status: SHIPPED | OUTPATIENT
Start: 2021-04-02 | End: 2021-04-12

## 2021-04-02 RX ORDER — METOPROLOL SUCCINATE 100 MG/1
100 TABLET, EXTENDED RELEASE ORAL DAILY
Qty: 30 TABLET | Refills: 11 | Status: ON HOLD | OUTPATIENT
Start: 2021-04-03 | End: 2021-05-13 | Stop reason: HOSPADM

## 2021-04-02 RX ADMIN — OXYCODONE HYDROCHLORIDE 10 MG: 10 TABLET ORAL at 05:04

## 2021-04-02 RX ADMIN — AMIODARONE HYDROCHLORIDE 400 MG: 200 TABLET ORAL at 08:04

## 2021-04-02 RX ADMIN — HYPROMELLOSE 2910 1 DROP: 5 SOLUTION OPHTHALMIC at 09:04

## 2021-04-02 RX ADMIN — DOCUSATE SODIUM 100 MG: 50 LIQUID ORAL at 10:04

## 2021-04-02 RX ADMIN — METOPROLOL SUCCINATE 100 MG: 50 TABLET, EXTENDED RELEASE ORAL at 09:04

## 2021-04-02 RX ADMIN — ISOSORBIDE MONONITRATE 120 MG: 60 TABLET, EXTENDED RELEASE ORAL at 08:04

## 2021-04-02 RX ADMIN — FUROSEMIDE 80 MG: 40 TABLET ORAL at 01:04

## 2021-04-02 RX ADMIN — POTASSIUM CHLORIDE 20 MEQ: 1500 TABLET, EXTENDED RELEASE ORAL at 08:04

## 2021-04-02 RX ADMIN — POLYETHYLENE GLYCOL 3350 17 G: 17 POWDER, FOR SOLUTION ORAL at 08:04

## 2021-04-02 RX ADMIN — WARFARIN SODIUM 4 MG: 4 TABLET ORAL at 05:04

## 2021-04-02 RX ADMIN — ASPIRIN 81 MG: 81 TABLET, COATED ORAL at 08:04

## 2021-04-02 RX ADMIN — FUROSEMIDE 40 MG: 10 INJECTION, SOLUTION INTRAMUSCULAR; INTRAVENOUS at 09:04

## 2021-04-02 RX ADMIN — LISINOPRIL 10 MG: 10 TABLET ORAL at 08:04

## 2021-04-02 RX ADMIN — FAMOTIDINE 20 MG: 20 TABLET ORAL at 09:04

## 2021-04-02 RX ADMIN — TAMSULOSIN HYDROCHLORIDE 0.4 MG: 0.4 CAPSULE ORAL at 08:04

## 2021-04-02 RX ADMIN — OXYCODONE HYDROCHLORIDE 10 MG: 10 TABLET ORAL at 11:04

## 2021-04-02 RX ADMIN — ACETAMINOPHEN 1000 MG: 500 TABLET ORAL at 01:04

## 2021-04-03 DIAGNOSIS — Z98.890 S/P MAZE OPERATION FOR ATRIAL FIBRILLATION: Primary | ICD-10-CM

## 2021-04-03 DIAGNOSIS — Z95.1 S/P CABG (CORONARY ARTERY BYPASS GRAFT): Primary | ICD-10-CM

## 2021-04-03 DIAGNOSIS — Z86.79 S/P MAZE OPERATION FOR ATRIAL FIBRILLATION: Primary | ICD-10-CM

## 2021-04-03 DIAGNOSIS — Z86.79 HISTORY OF ATRIAL FIBRILLATION: ICD-10-CM

## 2021-04-05 ENCOUNTER — ANTI-COAG VISIT (OUTPATIENT)
Dept: CARDIOLOGY | Facility: CLINIC | Age: 76
End: 2021-04-05

## 2021-04-05 ENCOUNTER — DOCUMENTATION ONLY (OUTPATIENT)
Dept: CARDIOTHORACIC SURGERY | Facility: CLINIC | Age: 76
End: 2021-04-05

## 2021-04-05 DIAGNOSIS — Z98.890 S/P MAZE OPERATION FOR ATRIAL FIBRILLATION: Primary | ICD-10-CM

## 2021-04-05 DIAGNOSIS — Z79.01 ANTICOAGULANT LONG-TERM USE: Primary | ICD-10-CM

## 2021-04-05 DIAGNOSIS — Z86.79 S/P MAZE OPERATION FOR ATRIAL FIBRILLATION: Primary | ICD-10-CM

## 2021-04-05 LAB
FINAL PATHOLOGIC DIAGNOSIS: NORMAL
Lab: NORMAL

## 2021-04-06 ENCOUNTER — HOSPITAL ENCOUNTER (OUTPATIENT)
Dept: CARDIOLOGY | Facility: HOSPITAL | Age: 76
Discharge: HOME | End: 2021-04-06
Attending: THORACIC SURGERY (CARDIOTHORACIC VASCULAR SURGERY)
Payer: OTHER GOVERNMENT

## 2021-04-06 VITALS
DIASTOLIC BLOOD PRESSURE: 60 MMHG | WEIGHT: 207 LBS | HEIGHT: 70 IN | SYSTOLIC BLOOD PRESSURE: 123 MMHG | HEART RATE: 90 BPM | BODY MASS INDEX: 29.63 KG/M2

## 2021-04-06 DIAGNOSIS — Z86.79 S/P MAZE OPERATION FOR ATRIAL FIBRILLATION: ICD-10-CM

## 2021-04-06 DIAGNOSIS — Z98.890 S/P MAZE OPERATION FOR ATRIAL FIBRILLATION: ICD-10-CM

## 2021-04-06 LAB
ASCENDING AORTA: 3.38 CM
AV INDEX (PROSTH): 0.31
AV MEAN GRADIENT: 18 MMHG
AV PEAK GRADIENT: 28 MMHG
AV VALVE AREA: 0.86 CM2
AV VELOCITY RATIO: 0.35
BSA FOR ECHO PROCEDURE: 2.15 M2
CV ECHO LV RWT: 0.4 CM
DOP CALC AO PEAK VEL: 2.65 M/S
DOP CALC AO VTI: 48.46 CM
DOP CALC LVOT AREA: 2.7 CM2
DOP CALC LVOT DIAMETER: 1.87 CM
DOP CALC LVOT PEAK VEL: 0.94 M/S
DOP CALC LVOT STROKE VOLUME: 41.89 CM3
DOP CALCLVOT PEAK VEL VTI: 15.26 CM
E/E' RATIO: 28.17 M/S
ECHO LV POSTERIOR WALL: 1.11 CM (ref 0.6–1.1)
EJECTION FRACTION: 38 %
FRACTIONAL SHORTENING: 20 % (ref 28–44)
INTERVENTRICULAR SEPTUM: 0.83 CM (ref 0.6–1.1)
LA MAJOR: 6.27 CM
LA MINOR: 6.19 CM
LA WIDTH: 4.25 CM
LEFT ATRIUM SIZE: 4.7 CM
LEFT ATRIUM VOLUME INDEX MOD: 37.5 ML/M2
LEFT ATRIUM VOLUME INDEX: 49.9 ML/M2
LEFT ATRIUM VOLUME MOD: 79.47 CM3
LEFT ATRIUM VOLUME: 105.77 CM3
LEFT INTERNAL DIMENSION IN SYSTOLE: 4.42 CM (ref 2.1–4)
LEFT VENTRICLE DIASTOLIC VOLUME INDEX: 69.82 ML/M2
LEFT VENTRICLE DIASTOLIC VOLUME: 148.01 ML
LEFT VENTRICLE MASS INDEX: 97 G/M2
LEFT VENTRICLE SYSTOLIC VOLUME INDEX: 41.8 ML/M2
LEFT VENTRICLE SYSTOLIC VOLUME: 88.64 ML
LEFT VENTRICULAR INTERNAL DIMENSION IN DIASTOLE: 5.51 CM (ref 3.5–6)
LEFT VENTRICULAR MASS: 205.45 G
LV LATERAL E/E' RATIO: 28.17 M/S
LV SEPTAL E/E' RATIO: 28.17 M/S
MV MEAN GRADIENT: 1 MMHG
MV PEAK E VEL: 1.69 M/S
MV PEAK GRADIENT: 18 MMHG
MV STENOSIS PRESSURE HALF TIME: 84.74 MS
MV VALVE AREA P 1/2 METHOD: 2.6 CM2
PISA TR MAX VEL: 3.82 M/S
RA MAJOR: 5.53 CM
RA PRESSURE: 3 MMHG
RA WIDTH: 3.95 CM
RIGHT VENTRICULAR END-DIASTOLIC DIMENSION: 3.17 CM
RV TISSUE DOPPLER FREE WALL SYSTOLIC VELOCITY 1 (APICAL 4 CHAMBER VIEW): 6.29 CM/S
SINUS: 2.81 CM
STJ: 3.2 CM
TDI LATERAL: 0.06 M/S
TDI SEPTAL: 0.06 M/S
TDI: 0.06 M/S
TR MAX PG: 58 MMHG
TRICUSPID ANNULAR PLANE SYSTOLIC EXCURSION: 1.3 CM
TV REST PULMONARY ARTERY PRESSURE: 61 MMHG

## 2021-04-06 PROCEDURE — 93306 TTE W/DOPPLER COMPLETE: CPT | Mod: 26,,, | Performed by: INTERNAL MEDICINE

## 2021-04-06 PROCEDURE — 93306 TTE W/DOPPLER COMPLETE: CPT

## 2021-04-06 PROCEDURE — 93306 ECHO (CUPID ONLY): ICD-10-PCS | Mod: 26,,, | Performed by: INTERNAL MEDICINE

## 2021-04-08 ENCOUNTER — DOCUMENTATION ONLY (OUTPATIENT)
Dept: CARDIOTHORACIC SURGERY | Facility: CLINIC | Age: 76
End: 2021-04-08

## 2021-04-13 DIAGNOSIS — Z95.1 S/P CABG (CORONARY ARTERY BYPASS GRAFT): Primary | ICD-10-CM

## 2021-04-13 RX ORDER — OXYCODONE HYDROCHLORIDE 5 MG/1
5 TABLET ORAL EVERY 4 HOURS PRN
Qty: 42 TABLET | Refills: 0 | Status: SHIPPED | OUTPATIENT
Start: 2021-04-13 | End: 2021-04-20

## 2021-05-01 ENCOUNTER — HOSPITAL ENCOUNTER (INPATIENT)
Facility: HOSPITAL | Age: 76
LOS: 12 days | Discharge: HOME | DRG: 291 | End: 2021-05-13
Attending: THORACIC SURGERY (CARDIOTHORACIC VASCULAR SURGERY) | Admitting: THORACIC SURGERY (CARDIOTHORACIC VASCULAR SURGERY)
Payer: OTHER GOVERNMENT

## 2021-05-01 DIAGNOSIS — I48.19 PERSISTENT ATRIAL FIBRILLATION: ICD-10-CM

## 2021-05-01 DIAGNOSIS — I50.9 CONGESTIVE HEART FAILURE, UNSPECIFIED HF CHRONICITY, UNSPECIFIED HEART FAILURE TYPE: ICD-10-CM

## 2021-05-01 DIAGNOSIS — Z98.890 S/P MAZE OPERATION FOR ATRIAL FIBRILLATION: ICD-10-CM

## 2021-05-01 DIAGNOSIS — I48.91 AF (ATRIAL FIBRILLATION): ICD-10-CM

## 2021-05-01 DIAGNOSIS — Z95.2 S/P AVR (AORTIC VALVE REPLACEMENT): ICD-10-CM

## 2021-05-01 DIAGNOSIS — N17.9 AKI (ACUTE KIDNEY INJURY): ICD-10-CM

## 2021-05-01 DIAGNOSIS — I50.9 HEART FAILURE: ICD-10-CM

## 2021-05-01 DIAGNOSIS — Z98.890 S/P TVR (TRICUSPID VALVE REPAIR): ICD-10-CM

## 2021-05-01 DIAGNOSIS — E87.1 HYPONATREMIA: ICD-10-CM

## 2021-05-01 DIAGNOSIS — I48.91 ATRIAL FIBRILLATION: ICD-10-CM

## 2021-05-01 DIAGNOSIS — Z98.890 S/P MVR (MITRAL VALVE REPAIR): ICD-10-CM

## 2021-05-01 DIAGNOSIS — I48.11 LONGSTANDING PERSISTENT ATRIAL FIBRILLATION: ICD-10-CM

## 2021-05-01 DIAGNOSIS — Z86.79 S/P MAZE OPERATION FOR ATRIAL FIBRILLATION: ICD-10-CM

## 2021-05-01 DIAGNOSIS — Z01.818 PRE-OP TESTING: ICD-10-CM

## 2021-05-01 DIAGNOSIS — I48.91 ATRIAL FIBRILLATION, UNSPECIFIED TYPE: ICD-10-CM

## 2021-05-01 DIAGNOSIS — N18.30 STAGE 3 CHRONIC KIDNEY DISEASE, UNSPECIFIED WHETHER STAGE 3A OR 3B CKD: ICD-10-CM

## 2021-05-01 DIAGNOSIS — I50.9 CHF (CONGESTIVE HEART FAILURE): ICD-10-CM

## 2021-05-01 DIAGNOSIS — I50.9 HEART FAILURE, UNSPECIFIED HF CHRONICITY, UNSPECIFIED HEART FAILURE TYPE: ICD-10-CM

## 2021-05-01 DIAGNOSIS — Z95.1 S/P CABG (CORONARY ARTERY BYPASS GRAFT): Primary | ICD-10-CM

## 2021-05-01 DIAGNOSIS — I49.9 ARRHYTHMIA: ICD-10-CM

## 2021-05-01 LAB
ALBUMIN SERPL BCP-MCNC: 2.9 G/DL (ref 3.5–5.2)
ALP SERPL-CCNC: 114 U/L (ref 55–135)
ALT SERPL W/O P-5'-P-CCNC: 14 U/L (ref 10–44)
ANION GAP SERPL CALC-SCNC: 7 MMOL/L (ref 8–16)
APTT BLDCRRT: 34.7 SEC (ref 21–32)
AST SERPL-CCNC: 21 U/L (ref 10–40)
BASOPHILS # BLD AUTO: 0.07 K/UL (ref 0–0.2)
BASOPHILS NFR BLD: 1 % (ref 0–1.9)
BILIRUB SERPL-MCNC: 0.9 MG/DL (ref 0.1–1)
BUN SERPL-MCNC: 31 MG/DL (ref 8–23)
CALCIUM SERPL-MCNC: 8.9 MG/DL (ref 8.7–10.5)
CHLORIDE SERPL-SCNC: 102 MMOL/L (ref 95–110)
CO2 SERPL-SCNC: 22 MMOL/L (ref 23–29)
CREAT SERPL-MCNC: 1.5 MG/DL (ref 0.5–1.4)
DIFFERENTIAL METHOD: ABNORMAL
EOSINOPHIL # BLD AUTO: 0.1 K/UL (ref 0–0.5)
EOSINOPHIL NFR BLD: 1.9 % (ref 0–8)
ERYTHROCYTE [DISTWIDTH] IN BLOOD BY AUTOMATED COUNT: 15.3 % (ref 11.5–14.5)
EST. GFR  (AFRICAN AMERICAN): 52.6 ML/MIN/1.73 M^2
EST. GFR  (NON AFRICAN AMERICAN): 45.5 ML/MIN/1.73 M^2
GLUCOSE SERPL-MCNC: 97 MG/DL (ref 70–110)
HCT VFR BLD AUTO: 34.3 % (ref 40–54)
HGB BLD-MCNC: 10.9 G/DL (ref 14–18)
IMM GRANULOCYTES # BLD AUTO: 0.03 K/UL (ref 0–0.04)
IMM GRANULOCYTES NFR BLD AUTO: 0.4 % (ref 0–0.5)
INR PPP: 3.1 (ref 0.8–1.2)
LYMPHOCYTES # BLD AUTO: 1.3 K/UL (ref 1–4.8)
LYMPHOCYTES NFR BLD: 18.1 % (ref 18–48)
MAGNESIUM SERPL-MCNC: 1.7 MG/DL (ref 1.6–2.6)
MAGNESIUM SERPL-MCNC: 1.8 MG/DL (ref 1.6–2.6)
MCH RBC QN AUTO: 27.5 PG (ref 27–31)
MCHC RBC AUTO-ENTMCNC: 31.8 G/DL (ref 32–36)
MCV RBC AUTO: 87 FL (ref 82–98)
MONOCYTES # BLD AUTO: 0.5 K/UL (ref 0.3–1)
MONOCYTES NFR BLD: 6.2 % (ref 4–15)
NEUTROPHILS # BLD AUTO: 5.3 K/UL (ref 1.8–7.7)
NEUTROPHILS NFR BLD: 72.4 % (ref 38–73)
NRBC BLD-RTO: 0 /100 WBC
PHOSPHATE SERPL-MCNC: 3.3 MG/DL (ref 2.7–4.5)
PLATELET # BLD AUTO: 141 K/UL (ref 150–450)
PMV BLD AUTO: 10.2 FL (ref 9.2–12.9)
POTASSIUM SERPL-SCNC: 4.4 MMOL/L (ref 3.5–5.1)
POTASSIUM SERPL-SCNC: 4.5 MMOL/L (ref 3.5–5.1)
PROT SERPL-MCNC: 6.6 G/DL (ref 6–8.4)
PROTHROMBIN TIME: 31.4 SEC (ref 9–12.5)
RBC # BLD AUTO: 3.96 M/UL (ref 4.6–6.2)
SODIUM SERPL-SCNC: 131 MMOL/L (ref 136–145)
WBC # BLD AUTO: 7.29 K/UL (ref 3.9–12.7)

## 2021-05-01 PROCEDURE — 99223 PR INITIAL HOSPITAL CARE,LEVL III: ICD-10-PCS | Mod: ,,, | Performed by: SURGERY

## 2021-05-01 PROCEDURE — 84100 ASSAY OF PHOSPHORUS: CPT | Performed by: STUDENT IN AN ORGANIZED HEALTH CARE EDUCATION/TRAINING PROGRAM

## 2021-05-01 PROCEDURE — 25000003 PHARM REV CODE 250: Performed by: STUDENT IN AN ORGANIZED HEALTH CARE EDUCATION/TRAINING PROGRAM

## 2021-05-01 PROCEDURE — 85610 PROTHROMBIN TIME: CPT | Performed by: STUDENT IN AN ORGANIZED HEALTH CARE EDUCATION/TRAINING PROGRAM

## 2021-05-01 PROCEDURE — 20000000 HC ICU ROOM

## 2021-05-01 PROCEDURE — 99223 1ST HOSP IP/OBS HIGH 75: CPT | Mod: ,,, | Performed by: SURGERY

## 2021-05-01 PROCEDURE — 85025 COMPLETE CBC W/AUTO DIFF WBC: CPT | Performed by: STUDENT IN AN ORGANIZED HEALTH CARE EDUCATION/TRAINING PROGRAM

## 2021-05-01 PROCEDURE — 84132 ASSAY OF SERUM POTASSIUM: CPT | Performed by: THORACIC SURGERY (CARDIOTHORACIC VASCULAR SURGERY)

## 2021-05-01 PROCEDURE — 85730 THROMBOPLASTIN TIME PARTIAL: CPT | Performed by: STUDENT IN AN ORGANIZED HEALTH CARE EDUCATION/TRAINING PROGRAM

## 2021-05-01 PROCEDURE — 80053 COMPREHEN METABOLIC PANEL: CPT | Performed by: STUDENT IN AN ORGANIZED HEALTH CARE EDUCATION/TRAINING PROGRAM

## 2021-05-01 PROCEDURE — 83735 ASSAY OF MAGNESIUM: CPT | Mod: 91 | Performed by: THORACIC SURGERY (CARDIOTHORACIC VASCULAR SURGERY)

## 2021-05-01 PROCEDURE — 83735 ASSAY OF MAGNESIUM: CPT | Performed by: STUDENT IN AN ORGANIZED HEALTH CARE EDUCATION/TRAINING PROGRAM

## 2021-05-01 PROCEDURE — 63600175 PHARM REV CODE 636 W HCPCS: Performed by: STUDENT IN AN ORGANIZED HEALTH CARE EDUCATION/TRAINING PROGRAM

## 2021-05-01 RX ORDER — WARFARIN 4 MG/1
4 TABLET ORAL DAILY
Status: DISCONTINUED | OUTPATIENT
Start: 2021-05-02 | End: 2021-05-04

## 2021-05-01 RX ORDER — MILRINONE LACTATE 0.2 MG/ML
0.25 INJECTION, SOLUTION INTRAVENOUS CONTINUOUS
Status: DISCONTINUED | OUTPATIENT
Start: 2021-05-01 | End: 2021-05-06

## 2021-05-01 RX ORDER — POTASSIUM CHLORIDE 7.45 MG/ML
80 INJECTION INTRAVENOUS
Status: DISCONTINUED | OUTPATIENT
Start: 2021-05-01 | End: 2021-05-04

## 2021-05-01 RX ORDER — METOPROLOL TARTRATE 50 MG/1
50 TABLET ORAL 3 TIMES DAILY
Status: DISCONTINUED | OUTPATIENT
Start: 2021-05-01 | End: 2021-05-05

## 2021-05-01 RX ORDER — MUPIROCIN 20 MG/G
OINTMENT TOPICAL 2 TIMES DAILY
Status: DISCONTINUED | OUTPATIENT
Start: 2021-05-01 | End: 2021-05-01

## 2021-05-01 RX ORDER — MAGNESIUM SULFATE HEPTAHYDRATE 40 MG/ML
2 INJECTION, SOLUTION INTRAVENOUS
Status: DISCONTINUED | OUTPATIENT
Start: 2021-05-01 | End: 2021-05-04

## 2021-05-01 RX ORDER — POTASSIUM CHLORIDE 7.45 MG/ML
60 INJECTION INTRAVENOUS
Status: DISCONTINUED | OUTPATIENT
Start: 2021-05-01 | End: 2021-05-04

## 2021-05-01 RX ORDER — TAMSULOSIN HYDROCHLORIDE 0.4 MG/1
0.4 CAPSULE ORAL DAILY
Status: DISCONTINUED | OUTPATIENT
Start: 2021-05-01 | End: 2021-05-13 | Stop reason: HOSPADM

## 2021-05-01 RX ORDER — LANOLIN ALCOHOL/MO/W.PET/CERES
800 CREAM (GRAM) TOPICAL 2 TIMES DAILY PRN
Status: DISCONTINUED | OUTPATIENT
Start: 2021-05-01 | End: 2021-05-03

## 2021-05-01 RX ORDER — LIDOCAINE HYDROCHLORIDE 10 MG/ML
1 INJECTION, SOLUTION EPIDURAL; INFILTRATION; INTRACAUDAL; PERINEURAL ONCE
Status: DISCONTINUED | OUTPATIENT
Start: 2021-05-01 | End: 2021-05-01

## 2021-05-01 RX ORDER — WARFARIN 4 MG/1
4 TABLET ORAL DAILY
Status: DISCONTINUED | OUTPATIENT
Start: 2021-05-01 | End: 2021-05-01

## 2021-05-01 RX ORDER — MAGNESIUM SULFATE HEPTAHYDRATE 40 MG/ML
4 INJECTION, SOLUTION INTRAVENOUS
Status: DISCONTINUED | OUTPATIENT
Start: 2021-05-01 | End: 2021-05-04

## 2021-05-01 RX ORDER — DILTIAZEM HCL 1 MG/ML
0-15 INJECTION, SOLUTION INTRAVENOUS CONTINUOUS
Status: DISCONTINUED | OUTPATIENT
Start: 2021-05-01 | End: 2021-05-04

## 2021-05-01 RX ORDER — DOBUTAMINE HYDROCHLORIDE 400 MG/100ML
2.5 INJECTION INTRAVENOUS CONTINUOUS
Status: DISCONTINUED | OUTPATIENT
Start: 2021-05-01 | End: 2021-05-01

## 2021-05-01 RX ORDER — ACETAMINOPHEN 325 MG/1
TABLET ORAL
Status: DISPENSED
Start: 2021-05-01 | End: 2021-05-02

## 2021-05-01 RX ORDER — ATORVASTATIN CALCIUM 20 MG/1
40 TABLET, FILM COATED ORAL NIGHTLY
Status: DISCONTINUED | OUTPATIENT
Start: 2021-05-01 | End: 2021-05-13 | Stop reason: HOSPADM

## 2021-05-01 RX ORDER — ACETAMINOPHEN 325 MG/1
650 TABLET ORAL EVERY 6 HOURS PRN
Status: DISCONTINUED | OUTPATIENT
Start: 2021-05-01 | End: 2021-05-13 | Stop reason: HOSPADM

## 2021-05-01 RX ORDER — ASPIRIN 81 MG/1
81 TABLET ORAL DAILY
Status: DISCONTINUED | OUTPATIENT
Start: 2021-05-02 | End: 2021-05-13 | Stop reason: HOSPADM

## 2021-05-01 RX ORDER — POTASSIUM CHLORIDE 7.45 MG/ML
40 INJECTION INTRAVENOUS
Status: DISCONTINUED | OUTPATIENT
Start: 2021-05-01 | End: 2021-05-04

## 2021-05-01 RX ADMIN — DILTIAZEM HYDROCHLORIDE 5 MG/HR: 5 INJECTION INTRAVENOUS at 02:05

## 2021-05-01 RX ADMIN — FUROSEMIDE 5 MG/HR: 10 INJECTION, SOLUTION INTRAMUSCULAR; INTRAVENOUS at 10:05

## 2021-05-01 RX ADMIN — ACETAMINOPHEN 650 MG: 325 TABLET ORAL at 10:05

## 2021-05-01 RX ADMIN — Medication 800 MG: at 11:05

## 2021-05-01 RX ADMIN — DOBUTAMINE HYDROCHLORIDE 2.5 MCG/KG/MIN: 400 INJECTION INTRAVENOUS at 01:05

## 2021-05-01 RX ADMIN — MILRINONE LACTATE IN DEXTROSE 0.25 MCG/KG/MIN: 200 INJECTION, SOLUTION INTRAVENOUS at 02:05

## 2021-05-01 RX ADMIN — ATORVASTATIN CALCIUM 40 MG: 20 TABLET, FILM COATED ORAL at 09:05

## 2021-05-01 RX ADMIN — METOPROLOL TARTRATE 50 MG: 50 TABLET, FILM COATED ORAL at 09:05

## 2021-05-01 RX ADMIN — FUROSEMIDE 15 MG/HR: 10 INJECTION, SOLUTION INTRAMUSCULAR; INTRAVENOUS at 02:05

## 2021-05-01 RX ADMIN — METOPROLOL TARTRATE 50 MG: 50 TABLET, FILM COATED ORAL at 04:05

## 2021-05-02 PROBLEM — I48.19 PERSISTENT ATRIAL FIBRILLATION: Status: ACTIVE | Noted: 2021-05-02

## 2021-05-02 LAB
ANION GAP SERPL CALC-SCNC: 8 MMOL/L (ref 8–16)
BASOPHILS # BLD AUTO: 0.08 K/UL (ref 0–0.2)
BASOPHILS NFR BLD: 1.3 % (ref 0–1.9)
BUN SERPL-MCNC: 27 MG/DL (ref 8–23)
CALCIUM SERPL-MCNC: 8.3 MG/DL (ref 8.7–10.5)
CHLORIDE SERPL-SCNC: 106 MMOL/L (ref 95–110)
CO2 SERPL-SCNC: 23 MMOL/L (ref 23–29)
CREAT SERPL-MCNC: 1.5 MG/DL (ref 0.5–1.4)
DIFFERENTIAL METHOD: ABNORMAL
EOSINOPHIL # BLD AUTO: 0.4 K/UL (ref 0–0.5)
EOSINOPHIL NFR BLD: 5.5 % (ref 0–8)
ERYTHROCYTE [DISTWIDTH] IN BLOOD BY AUTOMATED COUNT: 15.2 % (ref 11.5–14.5)
EST. GFR  (AFRICAN AMERICAN): 52.6 ML/MIN/1.73 M^2
EST. GFR  (NON AFRICAN AMERICAN): 45.5 ML/MIN/1.73 M^2
GLUCOSE SERPL-MCNC: 102 MG/DL (ref 70–110)
HCT VFR BLD AUTO: 32.7 % (ref 40–54)
HGB BLD-MCNC: 10.6 G/DL (ref 14–18)
IMM GRANULOCYTES # BLD AUTO: 0.03 K/UL (ref 0–0.04)
IMM GRANULOCYTES NFR BLD AUTO: 0.5 % (ref 0–0.5)
INR PPP: 3.2 (ref 0.8–1.2)
LYMPHOCYTES # BLD AUTO: 1.3 K/UL (ref 1–4.8)
LYMPHOCYTES NFR BLD: 20.1 % (ref 18–48)
MAGNESIUM SERPL-MCNC: 1.6 MG/DL (ref 1.6–2.6)
MAGNESIUM SERPL-MCNC: 1.8 MG/DL (ref 1.6–2.6)
MAGNESIUM SERPL-MCNC: 1.9 MG/DL (ref 1.6–2.6)
MAGNESIUM SERPL-MCNC: 2 MG/DL (ref 1.6–2.6)
MCH RBC QN AUTO: 27.9 PG (ref 27–31)
MCHC RBC AUTO-ENTMCNC: 32.4 G/DL (ref 32–36)
MCV RBC AUTO: 86 FL (ref 82–98)
MONOCYTES # BLD AUTO: 0.5 K/UL (ref 0.3–1)
MONOCYTES NFR BLD: 8.6 % (ref 4–15)
NEUTROPHILS # BLD AUTO: 4 K/UL (ref 1.8–7.7)
NEUTROPHILS NFR BLD: 64 % (ref 38–73)
NRBC BLD-RTO: 0 /100 WBC
PHOSPHATE SERPL-MCNC: 3.1 MG/DL (ref 2.7–4.5)
PLATELET # BLD AUTO: 132 K/UL (ref 150–450)
PMV BLD AUTO: 10.7 FL (ref 9.2–12.9)
POCT GLUCOSE: 192 MG/DL (ref 70–110)
POTASSIUM SERPL-SCNC: 3.9 MMOL/L (ref 3.5–5.1)
POTASSIUM SERPL-SCNC: 4 MMOL/L (ref 3.5–5.1)
POTASSIUM SERPL-SCNC: 4.1 MMOL/L (ref 3.5–5.1)
POTASSIUM SERPL-SCNC: 4.3 MMOL/L (ref 3.5–5.1)
PROTHROMBIN TIME: 32.3 SEC (ref 9–12.5)
RBC # BLD AUTO: 3.8 M/UL (ref 4.6–6.2)
SARS-COV-2 RDRP RESP QL NAA+PROBE: NEGATIVE
SODIUM SERPL-SCNC: 137 MMOL/L (ref 136–145)
WBC # BLD AUTO: 6.31 K/UL (ref 3.9–12.7)

## 2021-05-02 PROCEDURE — 99233 SBSQ HOSP IP/OBS HIGH 50: CPT | Mod: 24,,, | Performed by: SURGERY

## 2021-05-02 PROCEDURE — 83735 ASSAY OF MAGNESIUM: CPT | Mod: 91 | Performed by: THORACIC SURGERY (CARDIOTHORACIC VASCULAR SURGERY)

## 2021-05-02 PROCEDURE — 80048 BASIC METABOLIC PNL TOTAL CA: CPT | Performed by: THORACIC SURGERY (CARDIOTHORACIC VASCULAR SURGERY)

## 2021-05-02 PROCEDURE — 25000003 PHARM REV CODE 250: Performed by: STUDENT IN AN ORGANIZED HEALTH CARE EDUCATION/TRAINING PROGRAM

## 2021-05-02 PROCEDURE — 63600175 PHARM REV CODE 636 W HCPCS: Performed by: STUDENT IN AN ORGANIZED HEALTH CARE EDUCATION/TRAINING PROGRAM

## 2021-05-02 PROCEDURE — 25000003 PHARM REV CODE 250

## 2021-05-02 PROCEDURE — 27000221 HC OXYGEN, UP TO 24 HOURS

## 2021-05-02 PROCEDURE — U0002 COVID-19 LAB TEST NON-CDC: HCPCS | Performed by: THORACIC SURGERY (CARDIOTHORACIC VASCULAR SURGERY)

## 2021-05-02 PROCEDURE — 25000003 PHARM REV CODE 250: Performed by: THORACIC SURGERY (CARDIOTHORACIC VASCULAR SURGERY)

## 2021-05-02 PROCEDURE — 85025 COMPLETE CBC W/AUTO DIFF WBC: CPT | Performed by: STUDENT IN AN ORGANIZED HEALTH CARE EDUCATION/TRAINING PROGRAM

## 2021-05-02 PROCEDURE — 63600175 PHARM REV CODE 636 W HCPCS: Performed by: THORACIC SURGERY (CARDIOTHORACIC VASCULAR SURGERY)

## 2021-05-02 PROCEDURE — 99900035 HC TECH TIME PER 15 MIN (STAT)

## 2021-05-02 PROCEDURE — 94761 N-INVAS EAR/PLS OXIMETRY MLT: CPT

## 2021-05-02 PROCEDURE — 84132 ASSAY OF SERUM POTASSIUM: CPT | Mod: 91 | Performed by: THORACIC SURGERY (CARDIOTHORACIC VASCULAR SURGERY)

## 2021-05-02 PROCEDURE — 20000000 HC ICU ROOM

## 2021-05-02 PROCEDURE — 84100 ASSAY OF PHOSPHORUS: CPT | Performed by: THORACIC SURGERY (CARDIOTHORACIC VASCULAR SURGERY)

## 2021-05-02 PROCEDURE — 36415 COLL VENOUS BLD VENIPUNCTURE: CPT | Performed by: STUDENT IN AN ORGANIZED HEALTH CARE EDUCATION/TRAINING PROGRAM

## 2021-05-02 PROCEDURE — 85610 PROTHROMBIN TIME: CPT | Performed by: THORACIC SURGERY (CARDIOTHORACIC VASCULAR SURGERY)

## 2021-05-02 PROCEDURE — 99233 PR SUBSEQUENT HOSPITAL CARE,LEVL III: ICD-10-PCS | Mod: 24,,, | Performed by: SURGERY

## 2021-05-02 RX ORDER — LIDOCAINE HYDROCHLORIDE 10 MG/ML
INJECTION INFILTRATION; PERINEURAL
Status: COMPLETED
Start: 2021-05-02 | End: 2021-05-02

## 2021-05-02 RX ORDER — LIDOCAINE HYDROCHLORIDE 10 MG/ML
1 INJECTION INFILTRATION; PERINEURAL ONCE
Status: COMPLETED | OUTPATIENT
Start: 2021-05-02 | End: 2021-05-02

## 2021-05-02 RX ADMIN — METOPROLOL TARTRATE 50 MG: 50 TABLET, FILM COATED ORAL at 08:05

## 2021-05-02 RX ADMIN — FUROSEMIDE 15 MG/HR: 10 INJECTION, SOLUTION INTRAMUSCULAR; INTRAVENOUS at 11:05

## 2021-05-02 RX ADMIN — ACETAMINOPHEN 650 MG: 325 TABLET ORAL at 10:05

## 2021-05-02 RX ADMIN — LIDOCAINE HYDROCHLORIDE 1 ML: 10 INJECTION INFILTRATION; PERINEURAL at 04:05

## 2021-05-02 RX ADMIN — MILRINONE LACTATE IN DEXTROSE 0.38 MCG/KG/MIN: 200 INJECTION, SOLUTION INTRAVENOUS at 01:05

## 2021-05-02 RX ADMIN — MAGNESIUM SULFATE 2 G: 2 INJECTION INTRAVENOUS at 06:05

## 2021-05-02 RX ADMIN — MILRINONE LACTATE IN DEXTROSE 0.38 MCG/KG/MIN: 200 INJECTION, SOLUTION INTRAVENOUS at 06:05

## 2021-05-02 RX ADMIN — ASPIRIN 81 MG: 81 TABLET, COATED ORAL at 09:05

## 2021-05-02 RX ADMIN — CHLOROTHIAZIDE SODIUM 500 MG: 500 INJECTION, POWDER, LYOPHILIZED, FOR SOLUTION INTRAVENOUS at 11:05

## 2021-05-02 RX ADMIN — METOPROLOL TARTRATE 50 MG: 50 TABLET, FILM COATED ORAL at 09:05

## 2021-05-02 RX ADMIN — TAMSULOSIN HYDROCHLORIDE 0.4 MG: 0.4 CAPSULE ORAL at 09:05

## 2021-05-02 RX ADMIN — ACETAMINOPHEN 650 MG: 325 TABLET ORAL at 02:05

## 2021-05-02 RX ADMIN — FUROSEMIDE 25 MG/HR: 10 INJECTION, SOLUTION INTRAMUSCULAR; INTRAVENOUS at 11:05

## 2021-05-02 RX ADMIN — LIDOCAINE HYDROCHLORIDE 1 ML: 10 INJECTION, SOLUTION INFILTRATION; PERINEURAL at 04:05

## 2021-05-02 RX ADMIN — WARFARIN SODIUM 4 MG: 4 TABLET ORAL at 04:05

## 2021-05-02 RX ADMIN — ATORVASTATIN CALCIUM 40 MG: 20 TABLET, FILM COATED ORAL at 08:05

## 2021-05-02 RX ADMIN — MILRINONE LACTATE IN DEXTROSE 0.38 MCG/KG/MIN: 200 INJECTION, SOLUTION INTRAVENOUS at 09:05

## 2021-05-02 RX ADMIN — POTASSIUM CHLORIDE 10 MEQ: 7.46 INJECTION, SOLUTION INTRAVENOUS at 11:05

## 2021-05-02 RX ADMIN — METOPROLOL TARTRATE 50 MG: 50 TABLET, FILM COATED ORAL at 02:05

## 2021-05-02 RX ADMIN — CHLOROTHIAZIDE SODIUM 250 MG: 500 INJECTION, POWDER, LYOPHILIZED, FOR SOLUTION INTRAVENOUS at 06:05

## 2021-05-03 LAB
ABO + RH BLD: NORMAL
ANION GAP SERPL CALC-SCNC: 8 MMOL/L (ref 8–16)
BASOPHILS # BLD AUTO: 0.08 K/UL (ref 0–0.2)
BASOPHILS NFR BLD: 1 % (ref 0–1.9)
BLD GP AB SCN CELLS X3 SERPL QL: NORMAL
BNP SERPL-MCNC: 118 PG/ML (ref 0–99)
BUN SERPL-MCNC: 28 MG/DL (ref 8–23)
CALCIUM SERPL-MCNC: 8.7 MG/DL (ref 8.7–10.5)
CHLORIDE SERPL-SCNC: 99 MMOL/L (ref 95–110)
CO2 SERPL-SCNC: 25 MMOL/L (ref 23–29)
CREAT SERPL-MCNC: 1.7 MG/DL (ref 0.5–1.4)
DIFFERENTIAL METHOD: ABNORMAL
EOSINOPHIL # BLD AUTO: 0.3 K/UL (ref 0–0.5)
EOSINOPHIL NFR BLD: 4.3 % (ref 0–8)
ERYTHROCYTE [DISTWIDTH] IN BLOOD BY AUTOMATED COUNT: 14.7 % (ref 11.5–14.5)
EST. GFR  (AFRICAN AMERICAN): 45.2 ML/MIN/1.73 M^2
EST. GFR  (NON AFRICAN AMERICAN): 39.1 ML/MIN/1.73 M^2
GLUCOSE SERPL-MCNC: 123 MG/DL (ref 70–110)
HCT VFR BLD AUTO: 32.2 % (ref 40–54)
HGB BLD-MCNC: 10.5 G/DL (ref 14–18)
IMM GRANULOCYTES # BLD AUTO: 0.02 K/UL (ref 0–0.04)
IMM GRANULOCYTES NFR BLD AUTO: 0.3 % (ref 0–0.5)
INR PPP: 2.9 (ref 0.8–1.2)
LYMPHOCYTES # BLD AUTO: 1 K/UL (ref 1–4.8)
LYMPHOCYTES NFR BLD: 12.6 % (ref 18–48)
MAGNESIUM SERPL-MCNC: 1.7 MG/DL (ref 1.6–2.6)
MAGNESIUM SERPL-MCNC: 2 MG/DL (ref 1.6–2.6)
MAGNESIUM SERPL-MCNC: 2.1 MG/DL (ref 1.6–2.6)
MAGNESIUM SERPL-MCNC: 2.2 MG/DL (ref 1.6–2.6)
MCH RBC QN AUTO: 27.5 PG (ref 27–31)
MCHC RBC AUTO-ENTMCNC: 32.6 G/DL (ref 32–36)
MCV RBC AUTO: 84 FL (ref 82–98)
MONOCYTES # BLD AUTO: 0.5 K/UL (ref 0.3–1)
MONOCYTES NFR BLD: 6.2 % (ref 4–15)
NEUTROPHILS # BLD AUTO: 5.9 K/UL (ref 1.8–7.7)
NEUTROPHILS NFR BLD: 75.6 % (ref 38–73)
NRBC BLD-RTO: 0 /100 WBC
PHOSPHATE SERPL-MCNC: 3.3 MG/DL (ref 2.7–4.5)
PLATELET # BLD AUTO: 154 K/UL (ref 150–450)
PMV BLD AUTO: 10.7 FL (ref 9.2–12.9)
POTASSIUM SERPL-SCNC: 4.3 MMOL/L (ref 3.5–5.1)
POTASSIUM SERPL-SCNC: 4.4 MMOL/L (ref 3.5–5.1)
POTASSIUM SERPL-SCNC: 4.5 MMOL/L (ref 3.5–5.1)
POTASSIUM SERPL-SCNC: 4.5 MMOL/L (ref 3.5–5.1)
PROTHROMBIN TIME: 29.8 SEC (ref 9–12.5)
RBC # BLD AUTO: 3.82 M/UL (ref 4.6–6.2)
SODIUM SERPL-SCNC: 132 MMOL/L (ref 136–145)
TROPONIN I SERPL DL<=0.01 NG/ML-MCNC: 0.04 NG/ML (ref 0–0.03)
WBC # BLD AUTO: 7.76 K/UL (ref 3.9–12.7)

## 2021-05-03 PROCEDURE — 25000003 PHARM REV CODE 250: Performed by: STUDENT IN AN ORGANIZED HEALTH CARE EDUCATION/TRAINING PROGRAM

## 2021-05-03 PROCEDURE — 63600175 PHARM REV CODE 636 W HCPCS: Performed by: THORACIC SURGERY (CARDIOTHORACIC VASCULAR SURGERY)

## 2021-05-03 PROCEDURE — 25000003 PHARM REV CODE 250: Performed by: THORACIC SURGERY (CARDIOTHORACIC VASCULAR SURGERY)

## 2021-05-03 PROCEDURE — 85610 PROTHROMBIN TIME: CPT | Performed by: THORACIC SURGERY (CARDIOTHORACIC VASCULAR SURGERY)

## 2021-05-03 PROCEDURE — 99233 SBSQ HOSP IP/OBS HIGH 50: CPT | Mod: ,,, | Performed by: ANESTHESIOLOGY

## 2021-05-03 PROCEDURE — 99233 PR SUBSEQUENT HOSPITAL CARE,LEVL III: ICD-10-PCS | Mod: ,,, | Performed by: ANESTHESIOLOGY

## 2021-05-03 PROCEDURE — 83735 ASSAY OF MAGNESIUM: CPT | Mod: 91 | Performed by: STUDENT IN AN ORGANIZED HEALTH CARE EDUCATION/TRAINING PROGRAM

## 2021-05-03 PROCEDURE — 83735 ASSAY OF MAGNESIUM: CPT | Mod: 91 | Performed by: THORACIC SURGERY (CARDIOTHORACIC VASCULAR SURGERY)

## 2021-05-03 PROCEDURE — 83880 ASSAY OF NATRIURETIC PEPTIDE: CPT | Performed by: STUDENT IN AN ORGANIZED HEALTH CARE EDUCATION/TRAINING PROGRAM

## 2021-05-03 PROCEDURE — 99900035 HC TECH TIME PER 15 MIN (STAT)

## 2021-05-03 PROCEDURE — 36556 INSERT NON-TUNNEL CV CATH: CPT

## 2021-05-03 PROCEDURE — 84100 ASSAY OF PHOSPHORUS: CPT | Performed by: STUDENT IN AN ORGANIZED HEALTH CARE EDUCATION/TRAINING PROGRAM

## 2021-05-03 PROCEDURE — 63600175 PHARM REV CODE 636 W HCPCS

## 2021-05-03 PROCEDURE — 63600175 PHARM REV CODE 636 W HCPCS: Performed by: STUDENT IN AN ORGANIZED HEALTH CARE EDUCATION/TRAINING PROGRAM

## 2021-05-03 PROCEDURE — 25000003 PHARM REV CODE 250

## 2021-05-03 PROCEDURE — 84132 ASSAY OF SERUM POTASSIUM: CPT | Mod: 91 | Performed by: THORACIC SURGERY (CARDIOTHORACIC VASCULAR SURGERY)

## 2021-05-03 PROCEDURE — 20000000 HC ICU ROOM

## 2021-05-03 PROCEDURE — 27000221 HC OXYGEN, UP TO 24 HOURS

## 2021-05-03 PROCEDURE — 86900 BLOOD TYPING SEROLOGIC ABO: CPT | Performed by: STUDENT IN AN ORGANIZED HEALTH CARE EDUCATION/TRAINING PROGRAM

## 2021-05-03 PROCEDURE — 94761 N-INVAS EAR/PLS OXIMETRY MLT: CPT

## 2021-05-03 PROCEDURE — 84484 ASSAY OF TROPONIN QUANT: CPT | Performed by: STUDENT IN AN ORGANIZED HEALTH CARE EDUCATION/TRAINING PROGRAM

## 2021-05-03 PROCEDURE — 80048 BASIC METABOLIC PNL TOTAL CA: CPT | Performed by: STUDENT IN AN ORGANIZED HEALTH CARE EDUCATION/TRAINING PROGRAM

## 2021-05-03 PROCEDURE — 85025 COMPLETE CBC W/AUTO DIFF WBC: CPT | Performed by: STUDENT IN AN ORGANIZED HEALTH CARE EDUCATION/TRAINING PROGRAM

## 2021-05-03 RX ORDER — ONDANSETRON 2 MG/ML
INJECTION INTRAMUSCULAR; INTRAVENOUS
Status: COMPLETED
Start: 2021-05-03 | End: 2021-05-03

## 2021-05-03 RX ORDER — SODIUM CHLORIDE 9 MG/ML
INJECTION, SOLUTION INTRAVENOUS CONTINUOUS
Status: DISCONTINUED | OUTPATIENT
Start: 2021-05-03 | End: 2021-05-06

## 2021-05-03 RX ORDER — NOREPINEPHRINE BITARTRATE/D5W 4MG/250ML
0-3 PLASTIC BAG, INJECTION (ML) INTRAVENOUS CONTINUOUS
Status: DISCONTINUED | OUTPATIENT
Start: 2021-05-03 | End: 2021-05-04

## 2021-05-03 RX ORDER — FAMOTIDINE 20 MG/1
20 TABLET, FILM COATED ORAL DAILY
Status: DISCONTINUED | OUTPATIENT
Start: 2021-05-03 | End: 2021-05-07

## 2021-05-03 RX ORDER — AMIODARONE HYDROCHLORIDE 200 MG/1
200 TABLET ORAL DAILY
Status: DISCONTINUED | OUTPATIENT
Start: 2021-05-18 | End: 2021-05-13 | Stop reason: HOSPADM

## 2021-05-03 RX ORDER — AMIODARONE HYDROCHLORIDE 200 MG/1
200 TABLET ORAL 2 TIMES DAILY
Status: DISCONTINUED | OUTPATIENT
Start: 2021-05-03 | End: 2021-05-13 | Stop reason: HOSPADM

## 2021-05-03 RX ORDER — POTASSIUM CHLORIDE 20 MEQ/1
40 TABLET, EXTENDED RELEASE ORAL 3 TIMES DAILY PRN
Status: DISCONTINUED | OUTPATIENT
Start: 2021-05-03 | End: 2021-05-03

## 2021-05-03 RX ORDER — NOREPINEPHRINE BITARTRATE/D5W 4MG/250ML
0-3 PLASTIC BAG, INJECTION (ML) INTRAVENOUS CONTINUOUS
Status: DISCONTINUED | OUTPATIENT
Start: 2021-05-03 | End: 2021-05-03

## 2021-05-03 RX ORDER — NOREPINEPHRINE BITARTRATE 1 MG/ML
INJECTION, SOLUTION INTRAVENOUS
Status: COMPLETED
Start: 2021-05-03 | End: 2021-05-03

## 2021-05-03 RX ADMIN — NOREPINEPHRINE BITARTRATE 4 MG: 1 INJECTION, SOLUTION, CONCENTRATE INTRAVENOUS at 08:05

## 2021-05-03 RX ADMIN — Medication 0.04 MCG/KG/MIN: at 08:05

## 2021-05-03 RX ADMIN — ASPIRIN 81 MG: 81 TABLET, COATED ORAL at 08:05

## 2021-05-03 RX ADMIN — CHLOROTHIAZIDE SODIUM 500 MG: 500 INJECTION, POWDER, LYOPHILIZED, FOR SOLUTION INTRAVENOUS at 06:05

## 2021-05-03 RX ADMIN — DILTIAZEM HYDROCHLORIDE 10 MG/HR: 5 INJECTION INTRAVENOUS at 05:05

## 2021-05-03 RX ADMIN — FUROSEMIDE 15 MG/HR: 10 INJECTION, SOLUTION INTRAMUSCULAR; INTRAVENOUS at 11:05

## 2021-05-03 RX ADMIN — ACETAMINOPHEN 650 MG: 325 TABLET ORAL at 07:05

## 2021-05-03 RX ADMIN — VASOPRESSIN 0.04 UNITS/MIN: 20 INJECTION INTRAVENOUS at 10:05

## 2021-05-03 RX ADMIN — ONDANSETRON 4 MG: 2 INJECTION INTRAMUSCULAR; INTRAVENOUS at 08:05

## 2021-05-03 RX ADMIN — MILRINONE LACTATE IN DEXTROSE 0.25 MCG/KG/MIN: 200 INJECTION, SOLUTION INTRAVENOUS at 05:05

## 2021-05-03 RX ADMIN — ATORVASTATIN CALCIUM 40 MG: 20 TABLET, FILM COATED ORAL at 08:05

## 2021-05-03 RX ADMIN — CHLOROTHIAZIDE SODIUM 500 MG: 500 INJECTION, POWDER, LYOPHILIZED, FOR SOLUTION INTRAVENOUS at 08:05

## 2021-05-03 RX ADMIN — AMIODARONE HYDROCHLORIDE 200 MG: 200 TABLET ORAL at 08:05

## 2021-05-03 RX ADMIN — DILTIAZEM HYDROCHLORIDE 15 MG/HR: 5 INJECTION INTRAVENOUS at 10:05

## 2021-05-03 RX ADMIN — WARFARIN SODIUM 4 MG: 4 TABLET ORAL at 04:05

## 2021-05-03 RX ADMIN — MILRINONE LACTATE IN DEXTROSE 0.38 MCG/KG/MIN: 200 INJECTION, SOLUTION INTRAVENOUS at 04:05

## 2021-05-03 RX ADMIN — SODIUM CHLORIDE: 0.9 INJECTION, SOLUTION INTRAVENOUS at 08:05

## 2021-05-03 RX ADMIN — POTASSIUM CHLORIDE 40 MEQ: 1500 TABLET, EXTENDED RELEASE ORAL at 12:05

## 2021-05-03 RX ADMIN — VASOPRESSIN 0.04 UNITS/MIN: 20 INJECTION INTRAVENOUS at 03:05

## 2021-05-03 RX ADMIN — TAMSULOSIN HYDROCHLORIDE 0.4 MG: 0.4 CAPSULE ORAL at 08:05

## 2021-05-03 RX ADMIN — VASOPRESSIN 0.04 UNITS/MIN: 20 INJECTION INTRAVENOUS at 07:05

## 2021-05-03 RX ADMIN — DILTIAZEM HYDROCHLORIDE 15 MG/HR: 5 INJECTION INTRAVENOUS at 02:05

## 2021-05-03 RX ADMIN — MAGNESIUM SULFATE 2 G: 2 INJECTION INTRAVENOUS at 07:05

## 2021-05-03 RX ADMIN — FAMOTIDINE 20 MG: 20 TABLET ORAL at 08:05

## 2021-05-04 LAB
ALLENS TEST: ABNORMAL
ALLENS TEST: NORMAL
ANION GAP SERPL CALC-SCNC: 7 MMOL/L (ref 8–16)
BASOPHILS # BLD AUTO: 0.06 K/UL (ref 0–0.2)
BASOPHILS NFR BLD: 0.6 % (ref 0–1.9)
BUN SERPL-MCNC: 30 MG/DL (ref 8–23)
CALCIUM SERPL-MCNC: 8.5 MG/DL (ref 8.7–10.5)
CHLORIDE SERPL-SCNC: 93 MMOL/L (ref 95–110)
CO2 SERPL-SCNC: 29 MMOL/L (ref 23–29)
CREAT SERPL-MCNC: 1.9 MG/DL (ref 0.5–1.4)
DELSYS: ABNORMAL
DELSYS: NORMAL
DIFFERENTIAL METHOD: ABNORMAL
EOSINOPHIL # BLD AUTO: 0.2 K/UL (ref 0–0.5)
EOSINOPHIL NFR BLD: 1.8 % (ref 0–8)
ERYTHROCYTE [DISTWIDTH] IN BLOOD BY AUTOMATED COUNT: 14.5 % (ref 11.5–14.5)
EST. GFR  (AFRICAN AMERICAN): 39.6 ML/MIN/1.73 M^2
EST. GFR  (NON AFRICAN AMERICAN): 34.2 ML/MIN/1.73 M^2
GLUCOSE SERPL-MCNC: 165 MG/DL (ref 70–110)
HCO3 UR-SCNC: 29.5 MMOL/L (ref 24–28)
HCT VFR BLD AUTO: 29.1 % (ref 40–54)
HGB BLD-MCNC: 9.8 G/DL (ref 14–18)
IMM GRANULOCYTES # BLD AUTO: 0.04 K/UL (ref 0–0.04)
IMM GRANULOCYTES NFR BLD AUTO: 0.4 % (ref 0–0.5)
INR PPP: 4 (ref 0.8–1.2)
INR PPP: 4.3 (ref 0.8–1.2)
LDH SERPL L TO P-CCNC: 0.72 MMOL/L (ref 0.36–1.25)
LYMPHOCYTES # BLD AUTO: 1.1 K/UL (ref 1–4.8)
LYMPHOCYTES NFR BLD: 11.2 % (ref 18–48)
MAGNESIUM SERPL-MCNC: 1.7 MG/DL (ref 1.6–2.6)
MAGNESIUM SERPL-MCNC: 1.9 MG/DL (ref 1.6–2.6)
MCH RBC QN AUTO: 27.7 PG (ref 27–31)
MCHC RBC AUTO-ENTMCNC: 33.7 G/DL (ref 32–36)
MCV RBC AUTO: 82 FL (ref 82–98)
MONOCYTES # BLD AUTO: 0.6 K/UL (ref 0.3–1)
MONOCYTES NFR BLD: 6.4 % (ref 4–15)
NEUTROPHILS # BLD AUTO: 7.9 K/UL (ref 1.8–7.7)
NEUTROPHILS NFR BLD: 79.6 % (ref 38–73)
NRBC BLD-RTO: 0 /100 WBC
PCO2 BLDA: 39.8 MMHG (ref 35–45)
PH SMN: 7.48 [PH] (ref 7.35–7.45)
PHOSPHATE SERPL-MCNC: 4.1 MG/DL (ref 2.7–4.5)
PLATELET # BLD AUTO: 129 K/UL (ref 150–450)
PMV BLD AUTO: 10.2 FL (ref 9.2–12.9)
PO2 BLDA: 57 MMHG (ref 80–100)
POC BE: 6 MMOL/L
POC SATURATED O2: 91 % (ref 95–100)
POC TCO2: 31 MMOL/L (ref 23–27)
POTASSIUM SERPL-SCNC: 3.9 MMOL/L (ref 3.5–5.1)
POTASSIUM SERPL-SCNC: 4.1 MMOL/L (ref 3.5–5.1)
PROTHROMBIN TIME: 40.2 SEC (ref 9–12.5)
PROTHROMBIN TIME: 43.1 SEC (ref 9–12.5)
RBC # BLD AUTO: 3.54 M/UL (ref 4.6–6.2)
SAMPLE: ABNORMAL
SAMPLE: NORMAL
SITE: ABNORMAL
SITE: NORMAL
SODIUM SERPL-SCNC: 129 MMOL/L (ref 136–145)
TSH SERPL DL<=0.005 MIU/L-ACNC: 0.96 UIU/ML (ref 0.4–4)
WBC # BLD AUTO: 9.95 K/UL (ref 3.9–12.7)

## 2021-05-04 PROCEDURE — 20000000 HC ICU ROOM

## 2021-05-04 PROCEDURE — 25000003 PHARM REV CODE 250: Performed by: STUDENT IN AN ORGANIZED HEALTH CARE EDUCATION/TRAINING PROGRAM

## 2021-05-04 PROCEDURE — 99232 PR SUBSEQUENT HOSPITAL CARE,LEVL II: ICD-10-PCS | Mod: ,,, | Performed by: ANESTHESIOLOGY

## 2021-05-04 PROCEDURE — 63600175 PHARM REV CODE 636 W HCPCS: Performed by: THORACIC SURGERY (CARDIOTHORACIC VASCULAR SURGERY)

## 2021-05-04 PROCEDURE — 93010 EKG 12-LEAD: ICD-10-PCS | Mod: ,,, | Performed by: INTERNAL MEDICINE

## 2021-05-04 PROCEDURE — 85610 PROTHROMBIN TIME: CPT | Mod: 91 | Performed by: STUDENT IN AN ORGANIZED HEALTH CARE EDUCATION/TRAINING PROGRAM

## 2021-05-04 PROCEDURE — 63600175 PHARM REV CODE 636 W HCPCS: Performed by: STUDENT IN AN ORGANIZED HEALTH CARE EDUCATION/TRAINING PROGRAM

## 2021-05-04 PROCEDURE — 99223 1ST HOSP IP/OBS HIGH 75: CPT | Mod: ,,, | Performed by: INTERNAL MEDICINE

## 2021-05-04 PROCEDURE — 80048 BASIC METABOLIC PNL TOTAL CA: CPT | Performed by: STUDENT IN AN ORGANIZED HEALTH CARE EDUCATION/TRAINING PROGRAM

## 2021-05-04 PROCEDURE — 85025 COMPLETE CBC W/AUTO DIFF WBC: CPT | Performed by: STUDENT IN AN ORGANIZED HEALTH CARE EDUCATION/TRAINING PROGRAM

## 2021-05-04 PROCEDURE — 84100 ASSAY OF PHOSPHORUS: CPT | Performed by: STUDENT IN AN ORGANIZED HEALTH CARE EDUCATION/TRAINING PROGRAM

## 2021-05-04 PROCEDURE — 99232 SBSQ HOSP IP/OBS MODERATE 35: CPT | Mod: ,,, | Performed by: ANESTHESIOLOGY

## 2021-05-04 PROCEDURE — 83735 ASSAY OF MAGNESIUM: CPT | Performed by: STUDENT IN AN ORGANIZED HEALTH CARE EDUCATION/TRAINING PROGRAM

## 2021-05-04 PROCEDURE — 84443 ASSAY THYROID STIM HORMONE: CPT | Performed by: STUDENT IN AN ORGANIZED HEALTH CARE EDUCATION/TRAINING PROGRAM

## 2021-05-04 PROCEDURE — 25000003 PHARM REV CODE 250: Performed by: THORACIC SURGERY (CARDIOTHORACIC VASCULAR SURGERY)

## 2021-05-04 PROCEDURE — 83735 ASSAY OF MAGNESIUM: CPT | Mod: 91 | Performed by: STUDENT IN AN ORGANIZED HEALTH CARE EDUCATION/TRAINING PROGRAM

## 2021-05-04 PROCEDURE — 99900035 HC TECH TIME PER 15 MIN (STAT)

## 2021-05-04 PROCEDURE — 82803 BLOOD GASES ANY COMBINATION: CPT

## 2021-05-04 PROCEDURE — 84132 ASSAY OF SERUM POTASSIUM: CPT | Performed by: STUDENT IN AN ORGANIZED HEALTH CARE EDUCATION/TRAINING PROGRAM

## 2021-05-04 PROCEDURE — 99223 PR INITIAL HOSPITAL CARE,LEVL III: ICD-10-PCS | Mod: ,,, | Performed by: INTERNAL MEDICINE

## 2021-05-04 PROCEDURE — 37799 UNLISTED PX VASCULAR SURGERY: CPT

## 2021-05-04 PROCEDURE — 94761 N-INVAS EAR/PLS OXIMETRY MLT: CPT

## 2021-05-04 PROCEDURE — 93005 ELECTROCARDIOGRAM TRACING: CPT

## 2021-05-04 PROCEDURE — 93010 ELECTROCARDIOGRAM REPORT: CPT | Mod: ,,, | Performed by: INTERNAL MEDICINE

## 2021-05-04 PROCEDURE — 83605 ASSAY OF LACTIC ACID: CPT

## 2021-05-04 PROCEDURE — 85610 PROTHROMBIN TIME: CPT | Performed by: THORACIC SURGERY (CARDIOTHORACIC VASCULAR SURGERY)

## 2021-05-04 RX ORDER — DILTIAZEM HCL 1 MG/ML
0-15 INJECTION, SOLUTION INTRAVENOUS CONTINUOUS
Status: DISCONTINUED | OUTPATIENT
Start: 2021-05-04 | End: 2021-05-06

## 2021-05-04 RX ORDER — POTASSIUM CHLORIDE 29.8 MG/ML
80 INJECTION INTRAVENOUS
Status: DISCONTINUED | OUTPATIENT
Start: 2021-05-04 | End: 2021-05-12

## 2021-05-04 RX ORDER — SODIUM,POTASSIUM PHOSPHATES 280-250MG
1 POWDER IN PACKET (EA) ORAL 2 TIMES DAILY
Status: DISCONTINUED | OUTPATIENT
Start: 2021-05-04 | End: 2021-05-05

## 2021-05-04 RX ORDER — WARFARIN 2.5 MG/1
2.5 TABLET ORAL DAILY
Status: DISCONTINUED | OUTPATIENT
Start: 2021-05-04 | End: 2021-05-07

## 2021-05-04 RX ORDER — NOREPINEPHRINE BITARTRATE/D5W 4MG/250ML
0-3 PLASTIC BAG, INJECTION (ML) INTRAVENOUS CONTINUOUS
Status: DISCONTINUED | OUTPATIENT
Start: 2021-05-04 | End: 2021-05-06

## 2021-05-04 RX ORDER — POTASSIUM CHLORIDE 14.9 MG/ML
60 INJECTION INTRAVENOUS
Status: DISCONTINUED | OUTPATIENT
Start: 2021-05-04 | End: 2021-05-04

## 2021-05-04 RX ORDER — MAGNESIUM SULFATE HEPTAHYDRATE 40 MG/ML
4 INJECTION, SOLUTION INTRAVENOUS
Status: DISCONTINUED | OUTPATIENT
Start: 2021-05-04 | End: 2021-05-12

## 2021-05-04 RX ORDER — POTASSIUM CHLORIDE 29.8 MG/ML
40 INJECTION INTRAVENOUS
Status: DISCONTINUED | OUTPATIENT
Start: 2021-05-04 | End: 2021-05-12

## 2021-05-04 RX ORDER — POTASSIUM CHLORIDE 29.8 MG/ML
40 INJECTION INTRAVENOUS
Status: DISCONTINUED | OUTPATIENT
Start: 2021-05-04 | End: 2021-05-04

## 2021-05-04 RX ORDER — MAGNESIUM SULFATE HEPTAHYDRATE 40 MG/ML
2 INJECTION, SOLUTION INTRAVENOUS
Status: DISCONTINUED | OUTPATIENT
Start: 2021-05-04 | End: 2021-05-12

## 2021-05-04 RX ORDER — POTASSIUM CHLORIDE 29.8 MG/ML
80 INJECTION INTRAVENOUS
Status: DISCONTINUED | OUTPATIENT
Start: 2021-05-04 | End: 2021-05-04

## 2021-05-04 RX ORDER — POTASSIUM CHLORIDE 14.9 MG/ML
60 INJECTION INTRAVENOUS
Status: DISCONTINUED | OUTPATIENT
Start: 2021-05-04 | End: 2021-05-12

## 2021-05-04 RX ADMIN — ASPIRIN 81 MG: 81 TABLET, COATED ORAL at 09:05

## 2021-05-04 RX ADMIN — Medication 0.02 MCG/KG/MIN: at 05:05

## 2021-05-04 RX ADMIN — CHLOROTHIAZIDE SODIUM 500 MG: 500 INJECTION, POWDER, LYOPHILIZED, FOR SOLUTION INTRAVENOUS at 07:05

## 2021-05-04 RX ADMIN — MILRINONE LACTATE IN DEXTROSE 0.25 MCG/KG/MIN: 200 INJECTION, SOLUTION INTRAVENOUS at 07:05

## 2021-05-04 RX ADMIN — WARFARIN SODIUM 2.5 MG: 2.5 TABLET ORAL at 05:05

## 2021-05-04 RX ADMIN — FUROSEMIDE 15 MG/HR: 10 INJECTION, SOLUTION INTRAMUSCULAR; INTRAVENOUS at 06:05

## 2021-05-04 RX ADMIN — MAGNESIUM SULFATE 2 G: 2 INJECTION INTRAVENOUS at 02:05

## 2021-05-04 RX ADMIN — MILRINONE LACTATE IN DEXTROSE 0.25 MCG/KG/MIN: 200 INJECTION, SOLUTION INTRAVENOUS at 05:05

## 2021-05-04 RX ADMIN — VASOPRESSIN 0.04 UNITS/MIN: 20 INJECTION INTRAVENOUS at 06:05

## 2021-05-04 RX ADMIN — TAMSULOSIN HYDROCHLORIDE 0.4 MG: 0.4 CAPSULE ORAL at 09:05

## 2021-05-04 RX ADMIN — CHLOROTHIAZIDE SODIUM 500 MG: 500 INJECTION, POWDER, LYOPHILIZED, FOR SOLUTION INTRAVENOUS at 06:05

## 2021-05-04 RX ADMIN — AMIODARONE HYDROCHLORIDE 200 MG: 200 TABLET ORAL at 09:05

## 2021-05-04 RX ADMIN — VASOPRESSIN 0.04 UNITS/MIN: 20 INJECTION INTRAVENOUS at 10:05

## 2021-05-04 RX ADMIN — FAMOTIDINE 20 MG: 20 TABLET ORAL at 09:05

## 2021-05-04 RX ADMIN — ACETAMINOPHEN 650 MG: 325 TABLET ORAL at 05:05

## 2021-05-04 RX ADMIN — SODIUM CHLORIDE: 0.9 INJECTION, SOLUTION INTRAVENOUS at 06:05

## 2021-05-04 RX ADMIN — POTASSIUM CHLORIDE 40 MEQ: 29.8 INJECTION, SOLUTION INTRAVENOUS at 06:05

## 2021-05-04 RX ADMIN — VASOPRESSIN 0.04 UNITS/MIN: 20 INJECTION INTRAVENOUS at 02:05

## 2021-05-04 RX ADMIN — POTASSIUM & SODIUM PHOSPHATES POWDER PACK 280-160-250 MG 1 PACKET: 280-160-250 PACK at 09:05

## 2021-05-04 RX ADMIN — DILTIAZEM HYDROCHLORIDE 15 MG/HR: 5 INJECTION INTRAVENOUS at 05:05

## 2021-05-04 RX ADMIN — POTASSIUM & SODIUM PHOSPHATES POWDER PACK 280-160-250 MG 1 PACKET: 280-160-250 PACK at 08:05

## 2021-05-04 RX ADMIN — DILTIAZEM HYDROCHLORIDE 15 MG/HR: 5 INJECTION INTRAVENOUS at 06:05

## 2021-05-04 RX ADMIN — AMIODARONE HYDROCHLORIDE 200 MG: 200 TABLET ORAL at 08:05

## 2021-05-04 RX ADMIN — ATORVASTATIN CALCIUM 40 MG: 20 TABLET, FILM COATED ORAL at 08:05

## 2021-05-05 PROBLEM — E87.1 HYPONATREMIA: Status: ACTIVE | Noted: 2021-05-05

## 2021-05-05 LAB
ALBUMIN SERPL BCP-MCNC: 2.7 G/DL (ref 3.5–5.2)
ANION GAP SERPL CALC-SCNC: 11 MMOL/L (ref 8–16)
ANION GAP SERPL CALC-SCNC: 11 MMOL/L (ref 8–16)
ASCENDING AORTA: 3.22 CM
AV INDEX (PROSTH): 0.34
AV MEAN GRADIENT: 14 MMHG
AV PEAK GRADIENT: 19 MMHG
AV VALVE AREA: 1.09 CM2
AV VELOCITY RATIO: 0.34
BASOPHILS # BLD AUTO: 0.05 K/UL (ref 0–0.2)
BASOPHILS NFR BLD: 0.7 % (ref 0–1.9)
BSA FOR ECHO PROCEDURE: 2.09 M2
BUN SERPL-MCNC: 34 MG/DL (ref 8–23)
BUN SERPL-MCNC: 34 MG/DL (ref 8–23)
CALCIUM SERPL-MCNC: 8.2 MG/DL (ref 8.7–10.5)
CALCIUM SERPL-MCNC: 8.6 MG/DL (ref 8.7–10.5)
CHLORIDE SERPL-SCNC: 88 MMOL/L (ref 95–110)
CHLORIDE SERPL-SCNC: 90 MMOL/L (ref 95–110)
CO2 SERPL-SCNC: 26 MMOL/L (ref 23–29)
CO2 SERPL-SCNC: 27 MMOL/L (ref 23–29)
CREAT SERPL-MCNC: 2 MG/DL (ref 0.5–1.4)
CREAT SERPL-MCNC: 2.2 MG/DL (ref 0.5–1.4)
CV ECHO LV RWT: 0.39 CM
DIFFERENTIAL METHOD: ABNORMAL
DOP CALC AO PEAK VEL: 2.18 M/S
DOP CALC AO VTI: 43.2 CM
DOP CALC LVOT AREA: 3.2 CM2
DOP CALC LVOT DIAMETER: 2.03 CM
DOP CALC LVOT PEAK VEL: 0.75 M/S
DOP CALC LVOT STROKE VOLUME: 46.94 CM3
DOP CALCLVOT PEAK VEL VTI: 14.51 CM
ECHO LV POSTERIOR WALL: 0.99 CM (ref 0.6–1.1)
EJECTION FRACTION: 35 %
EOSINOPHIL # BLD AUTO: 0.3 K/UL (ref 0–0.5)
EOSINOPHIL NFR BLD: 4 % (ref 0–8)
ERYTHROCYTE [DISTWIDTH] IN BLOOD BY AUTOMATED COUNT: 14.6 % (ref 11.5–14.5)
EST. GFR  (AFRICAN AMERICAN): 33.1 ML/MIN/1.73 M^2
EST. GFR  (AFRICAN AMERICAN): 37.2 ML/MIN/1.73 M^2
EST. GFR  (NON AFRICAN AMERICAN): 28.7 ML/MIN/1.73 M^2
EST. GFR  (NON AFRICAN AMERICAN): 32.2 ML/MIN/1.73 M^2
FRACTIONAL SHORTENING: 17 % (ref 28–44)
GLUCOSE SERPL-MCNC: 160 MG/DL (ref 70–110)
GLUCOSE SERPL-MCNC: 182 MG/DL (ref 70–110)
HCT VFR BLD AUTO: 26.7 % (ref 40–54)
HGB BLD-MCNC: 9 G/DL (ref 14–18)
IMM GRANULOCYTES # BLD AUTO: 0.04 K/UL (ref 0–0.04)
IMM GRANULOCYTES NFR BLD AUTO: 0.5 % (ref 0–0.5)
INR PPP: 3.2 (ref 0.8–1.2)
INTERVENTRICULAR SEPTUM: 0.91 CM (ref 0.6–1.1)
LA MAJOR: 6.28 CM
LA MINOR: 6.41 CM
LA WIDTH: 4.8 CM
LEFT ATRIUM SIZE: 4.88 CM
LEFT ATRIUM VOLUME INDEX: 61.6 ML/M2
LEFT ATRIUM VOLUME: 126.32 CM3
LEFT INTERNAL DIMENSION IN SYSTOLE: 4.24 CM (ref 2.1–4)
LEFT VENTRICLE DIASTOLIC VOLUME INDEX: 60.84 ML/M2
LEFT VENTRICLE DIASTOLIC VOLUME: 124.72 ML
LEFT VENTRICLE MASS INDEX: 86 G/M2
LEFT VENTRICLE SYSTOLIC VOLUME INDEX: 39.3 ML/M2
LEFT VENTRICLE SYSTOLIC VOLUME: 80.52 ML
LEFT VENTRICULAR INTERNAL DIMENSION IN DIASTOLE: 5.12 CM (ref 3.5–6)
LEFT VENTRICULAR MASS: 176.76 G
LYMPHOCYTES # BLD AUTO: 1.1 K/UL (ref 1–4.8)
LYMPHOCYTES NFR BLD: 14.4 % (ref 18–48)
MAGNESIUM SERPL-MCNC: 2 MG/DL (ref 1.6–2.6)
MAGNESIUM SERPL-MCNC: 2.1 MG/DL (ref 1.6–2.6)
MCH RBC QN AUTO: 26.8 PG (ref 27–31)
MCHC RBC AUTO-ENTMCNC: 33.7 G/DL (ref 32–36)
MCV RBC AUTO: 80 FL (ref 82–98)
MONOCYTES # BLD AUTO: 0.6 K/UL (ref 0.3–1)
MONOCYTES NFR BLD: 7.5 % (ref 4–15)
NEUTROPHILS # BLD AUTO: 5.5 K/UL (ref 1.8–7.7)
NEUTROPHILS NFR BLD: 72.9 % (ref 38–73)
NRBC BLD-RTO: 0 /100 WBC
OSMOLALITY SERPL: 279 MOSM/KG (ref 280–300)
OSMOLALITY SERPL: 279 MOSM/KG (ref 280–300)
OSMOLALITY UR: 367 MOSM/KG (ref 50–1200)
OSMOLALITY UR: 367 MOSM/KG (ref 50–1200)
PHOSPHATE SERPL-MCNC: 4.1 MG/DL (ref 2.7–4.5)
PHOSPHATE SERPL-MCNC: 4.1 MG/DL (ref 2.7–4.5)
PISA TR MAX VEL: 3.15 M/S
PLATELET # BLD AUTO: 123 K/UL (ref 150–450)
PMV BLD AUTO: 10.2 FL (ref 9.2–12.9)
POTASSIUM SERPL-SCNC: 3.9 MMOL/L (ref 3.5–5.1)
POTASSIUM SERPL-SCNC: 4.2 MMOL/L (ref 3.5–5.1)
POTASSIUM SERPL-SCNC: 4.3 MMOL/L (ref 3.5–5.1)
POTASSIUM UR-SCNC: 78 MMOL/L (ref 15–95)
POTASSIUM UR-SCNC: 78 MMOL/L (ref 15–95)
PROTHROMBIN TIME: 32.5 SEC (ref 9–12.5)
QEF: 28 %
RA MAJOR: 4.57 CM
RA WIDTH: 4.24 CM
RBC # BLD AUTO: 3.36 M/UL (ref 4.6–6.2)
RIGHT VENTRICULAR END-DIASTOLIC DIMENSION: 3.67 CM
SINUS: 2.74 CM
SODIUM SERPL-SCNC: 125 MMOL/L (ref 136–145)
SODIUM SERPL-SCNC: 128 MMOL/L (ref 136–145)
SODIUM UR-SCNC: 40 MMOL/L (ref 20–250)
SODIUM UR-SCNC: 40 MMOL/L (ref 20–250)
STJ: 2.77 CM
TDI LATERAL: 0.04 M/S
TDI SEPTAL: 0.04 M/S
TDI: 0.04 M/S
TR MAX PG: 40 MMHG
TRICUSPID ANNULAR PLANE SYSTOLIC EXCURSION: 1.44 CM
URATE SERPL-MCNC: 10.2 MG/DL (ref 3.4–7)
URATE SERPL-MCNC: 10.2 MG/DL (ref 3.4–7)
WBC # BLD AUTO: 7.49 K/UL (ref 3.9–12.7)

## 2021-05-05 PROCEDURE — 84133 ASSAY OF URINE POTASSIUM: CPT | Performed by: NURSE PRACTITIONER

## 2021-05-05 PROCEDURE — 99900035 HC TECH TIME PER 15 MIN (STAT)

## 2021-05-05 PROCEDURE — 99223 PR INITIAL HOSPITAL CARE,LEVL III: ICD-10-PCS | Mod: ,,, | Performed by: INTERNAL MEDICINE

## 2021-05-05 PROCEDURE — 25000003 PHARM REV CODE 250: Performed by: STUDENT IN AN ORGANIZED HEALTH CARE EDUCATION/TRAINING PROGRAM

## 2021-05-05 PROCEDURE — 99232 SBSQ HOSP IP/OBS MODERATE 35: CPT | Mod: ,,, | Performed by: ANESTHESIOLOGY

## 2021-05-05 PROCEDURE — 83930 ASSAY OF BLOOD OSMOLALITY: CPT | Performed by: NURSE PRACTITIONER

## 2021-05-05 PROCEDURE — 84550 ASSAY OF BLOOD/URIC ACID: CPT | Performed by: NURSE PRACTITIONER

## 2021-05-05 PROCEDURE — 36556 INSERT NON-TUNNEL CV CATH: CPT

## 2021-05-05 PROCEDURE — 36620 INSERTION CATHETER ARTERY: CPT

## 2021-05-05 PROCEDURE — 99232 PR SUBSEQUENT HOSPITAL CARE,LEVL II: ICD-10-PCS | Mod: ,,, | Performed by: ANESTHESIOLOGY

## 2021-05-05 PROCEDURE — 84300 ASSAY OF URINE SODIUM: CPT | Performed by: NURSE PRACTITIONER

## 2021-05-05 PROCEDURE — 83735 ASSAY OF MAGNESIUM: CPT | Performed by: STUDENT IN AN ORGANIZED HEALTH CARE EDUCATION/TRAINING PROGRAM

## 2021-05-05 PROCEDURE — 99499 NO LOS: ICD-10-PCS | Mod: ,,, | Performed by: INTERNAL MEDICINE

## 2021-05-05 PROCEDURE — 63600175 PHARM REV CODE 636 W HCPCS: Performed by: STUDENT IN AN ORGANIZED HEALTH CARE EDUCATION/TRAINING PROGRAM

## 2021-05-05 PROCEDURE — 94761 N-INVAS EAR/PLS OXIMETRY MLT: CPT

## 2021-05-05 PROCEDURE — 84100 ASSAY OF PHOSPHORUS: CPT | Performed by: STUDENT IN AN ORGANIZED HEALTH CARE EDUCATION/TRAINING PROGRAM

## 2021-05-05 PROCEDURE — 94799 UNLISTED PULMONARY SVC/PX: CPT

## 2021-05-05 PROCEDURE — 25000003 PHARM REV CODE 250: Performed by: THORACIC SURGERY (CARDIOTHORACIC VASCULAR SURGERY)

## 2021-05-05 PROCEDURE — 85610 PROTHROMBIN TIME: CPT | Performed by: THORACIC SURGERY (CARDIOTHORACIC VASCULAR SURGERY)

## 2021-05-05 PROCEDURE — 80048 BASIC METABOLIC PNL TOTAL CA: CPT | Performed by: STUDENT IN AN ORGANIZED HEALTH CARE EDUCATION/TRAINING PROGRAM

## 2021-05-05 PROCEDURE — 80069 RENAL FUNCTION PANEL: CPT | Performed by: NURSE PRACTITIONER

## 2021-05-05 PROCEDURE — 20000000 HC ICU ROOM

## 2021-05-05 PROCEDURE — 83935 ASSAY OF URINE OSMOLALITY: CPT | Performed by: NURSE PRACTITIONER

## 2021-05-05 PROCEDURE — 85025 COMPLETE CBC W/AUTO DIFF WBC: CPT | Performed by: STUDENT IN AN ORGANIZED HEALTH CARE EDUCATION/TRAINING PROGRAM

## 2021-05-05 PROCEDURE — 99223 1ST HOSP IP/OBS HIGH 75: CPT | Mod: ,,, | Performed by: INTERNAL MEDICINE

## 2021-05-05 PROCEDURE — 27000221 HC OXYGEN, UP TO 24 HOURS

## 2021-05-05 PROCEDURE — 99499 UNLISTED E&M SERVICE: CPT | Mod: ,,, | Performed by: INTERNAL MEDICINE

## 2021-05-05 RX ORDER — POTASSIUM CHLORIDE 14.9 MG/ML
20 INJECTION INTRAVENOUS ONCE
Status: COMPLETED | OUTPATIENT
Start: 2021-05-05 | End: 2021-05-05

## 2021-05-05 RX ORDER — METOPROLOL TARTRATE 50 MG/1
50 TABLET ORAL 3 TIMES DAILY
Status: DISCONTINUED | OUTPATIENT
Start: 2021-05-05 | End: 2021-05-06

## 2021-05-05 RX ORDER — FENTANYL CITRATE 50 UG/ML
25 INJECTION, SOLUTION INTRAMUSCULAR; INTRAVENOUS EVERY 5 MIN PRN
Status: CANCELLED | OUTPATIENT
Start: 2021-05-05

## 2021-05-05 RX ORDER — SODIUM CHLORIDE 0.9 % (FLUSH) 0.9 %
10 SYRINGE (ML) INJECTION
Status: CANCELLED | OUTPATIENT
Start: 2021-05-05

## 2021-05-05 RX ORDER — FUROSEMIDE 40 MG/1
80 TABLET ORAL DAILY
Status: DISCONTINUED | OUTPATIENT
Start: 2021-05-05 | End: 2021-05-06

## 2021-05-05 RX ORDER — SODIUM BICARBONATE 650 MG/1
1300 TABLET ORAL 3 TIMES DAILY
Status: DISCONTINUED | OUTPATIENT
Start: 2021-05-05 | End: 2021-05-05

## 2021-05-05 RX ORDER — METOPROLOL TARTRATE 50 MG/1
100 TABLET ORAL 3 TIMES DAILY
Status: DISCONTINUED | OUTPATIENT
Start: 2021-05-05 | End: 2021-05-05

## 2021-05-05 RX ADMIN — ACETAMINOPHEN 650 MG: 325 TABLET ORAL at 09:05

## 2021-05-05 RX ADMIN — TAMSULOSIN HYDROCHLORIDE 0.4 MG: 0.4 CAPSULE ORAL at 08:05

## 2021-05-05 RX ADMIN — ATORVASTATIN CALCIUM 40 MG: 20 TABLET, FILM COATED ORAL at 09:05

## 2021-05-05 RX ADMIN — FUROSEMIDE 80 MG: 40 TABLET ORAL at 12:05

## 2021-05-05 RX ADMIN — VASOPRESSIN 0.04 UNITS/MIN: 20 INJECTION INTRAVENOUS at 09:05

## 2021-05-05 RX ADMIN — AMIODARONE HYDROCHLORIDE 200 MG: 200 TABLET ORAL at 09:05

## 2021-05-05 RX ADMIN — MILRINONE LACTATE IN DEXTROSE 0.25 MCG/KG/MIN: 200 INJECTION, SOLUTION INTRAVENOUS at 03:05

## 2021-05-05 RX ADMIN — ASPIRIN 81 MG: 81 TABLET, COATED ORAL at 08:05

## 2021-05-05 RX ADMIN — DILTIAZEM HYDROCHLORIDE 12.5 MG/HR: 5 INJECTION INTRAVENOUS at 02:05

## 2021-05-05 RX ADMIN — POTASSIUM CHLORIDE 20 MEQ: 14.9 INJECTION, SOLUTION INTRAVENOUS at 05:05

## 2021-05-05 RX ADMIN — ACETAMINOPHEN 650 MG: 325 TABLET ORAL at 06:05

## 2021-05-05 RX ADMIN — AMIODARONE HYDROCHLORIDE 200 MG: 200 TABLET ORAL at 08:05

## 2021-05-05 RX ADMIN — METOPROLOL TARTRATE 50 MG: 50 TABLET, FILM COATED ORAL at 09:05

## 2021-05-05 RX ADMIN — WARFARIN SODIUM 2.5 MG: 2.5 TABLET ORAL at 05:05

## 2021-05-05 RX ADMIN — MILRINONE LACTATE IN DEXTROSE 0.25 MCG/KG/MIN: 200 INJECTION, SOLUTION INTRAVENOUS at 06:05

## 2021-05-05 RX ADMIN — VASOPRESSIN 0.04 UNITS/MIN: 20 INJECTION INTRAVENOUS at 01:05

## 2021-05-05 RX ADMIN — FUROSEMIDE 15 MG/HR: 10 INJECTION, SOLUTION INTRAMUSCULAR; INTRAVENOUS at 05:05

## 2021-05-05 RX ADMIN — METOPROLOL TARTRATE 50 MG: 50 TABLET, FILM COATED ORAL at 03:05

## 2021-05-05 RX ADMIN — METOPROLOL TARTRATE 100 MG: 50 TABLET, FILM COATED ORAL at 08:05

## 2021-05-05 RX ADMIN — FAMOTIDINE 20 MG: 20 TABLET ORAL at 08:05

## 2021-05-06 LAB
ANION GAP SERPL CALC-SCNC: 11 MMOL/L (ref 8–16)
BASOPHILS # BLD AUTO: 0.05 K/UL (ref 0–0.2)
BASOPHILS NFR BLD: 0.8 % (ref 0–1.9)
BUN SERPL-MCNC: 36 MG/DL (ref 8–23)
CALCIUM SERPL-MCNC: 8.2 MG/DL (ref 8.7–10.5)
CHLORIDE SERPL-SCNC: 89 MMOL/L (ref 95–110)
CO2 SERPL-SCNC: 27 MMOL/L (ref 23–29)
CREAT SERPL-MCNC: 1.7 MG/DL (ref 0.5–1.4)
DIFFERENTIAL METHOD: ABNORMAL
EOSINOPHIL # BLD AUTO: 0.3 K/UL (ref 0–0.5)
EOSINOPHIL NFR BLD: 5.4 % (ref 0–8)
ERYTHROCYTE [DISTWIDTH] IN BLOOD BY AUTOMATED COUNT: 14 % (ref 11.5–14.5)
EST. GFR  (AFRICAN AMERICAN): 45.2 ML/MIN/1.73 M^2
EST. GFR  (NON AFRICAN AMERICAN): 39.1 ML/MIN/1.73 M^2
GLUCOSE SERPL-MCNC: 110 MG/DL (ref 70–110)
HCT VFR BLD AUTO: 26.1 % (ref 40–54)
HGB BLD-MCNC: 8.8 G/DL (ref 14–18)
IMM GRANULOCYTES # BLD AUTO: 0.03 K/UL (ref 0–0.04)
IMM GRANULOCYTES NFR BLD AUTO: 0.5 % (ref 0–0.5)
INR PPP: 3.5 (ref 0.8–1.2)
LYMPHOCYTES # BLD AUTO: 1.2 K/UL (ref 1–4.8)
LYMPHOCYTES NFR BLD: 19.2 % (ref 18–48)
MAGNESIUM SERPL-MCNC: 1.8 MG/DL (ref 1.6–2.6)
MCH RBC QN AUTO: 27.6 PG (ref 27–31)
MCHC RBC AUTO-ENTMCNC: 33.7 G/DL (ref 32–36)
MCV RBC AUTO: 82 FL (ref 82–98)
MONOCYTES # BLD AUTO: 0.5 K/UL (ref 0.3–1)
MONOCYTES NFR BLD: 8.4 % (ref 4–15)
NEUTROPHILS # BLD AUTO: 4 K/UL (ref 1.8–7.7)
NEUTROPHILS NFR BLD: 65.7 % (ref 38–73)
NRBC BLD-RTO: 0 /100 WBC
PHOSPHATE SERPL-MCNC: 3.5 MG/DL (ref 2.7–4.5)
PLATELET # BLD AUTO: 142 K/UL (ref 150–450)
PMV BLD AUTO: 10.8 FL (ref 9.2–12.9)
POTASSIUM SERPL-SCNC: 3.7 MMOL/L (ref 3.5–5.1)
PROTHROMBIN TIME: 35 SEC (ref 9–12.5)
RBC # BLD AUTO: 3.19 M/UL (ref 4.6–6.2)
SODIUM SERPL-SCNC: 127 MMOL/L (ref 136–145)
WBC # BLD AUTO: 6.1 K/UL (ref 3.9–12.7)

## 2021-05-06 PROCEDURE — 94761 N-INVAS EAR/PLS OXIMETRY MLT: CPT

## 2021-05-06 PROCEDURE — 25000003 PHARM REV CODE 250: Performed by: STUDENT IN AN ORGANIZED HEALTH CARE EDUCATION/TRAINING PROGRAM

## 2021-05-06 PROCEDURE — 80048 BASIC METABOLIC PNL TOTAL CA: CPT | Performed by: STUDENT IN AN ORGANIZED HEALTH CARE EDUCATION/TRAINING PROGRAM

## 2021-05-06 PROCEDURE — 99232 SBSQ HOSP IP/OBS MODERATE 35: CPT | Mod: ,,, | Performed by: ANESTHESIOLOGY

## 2021-05-06 PROCEDURE — 84100 ASSAY OF PHOSPHORUS: CPT | Performed by: STUDENT IN AN ORGANIZED HEALTH CARE EDUCATION/TRAINING PROGRAM

## 2021-05-06 PROCEDURE — 20000000 HC ICU ROOM

## 2021-05-06 PROCEDURE — 99233 SBSQ HOSP IP/OBS HIGH 50: CPT | Mod: ,,, | Performed by: INTERNAL MEDICINE

## 2021-05-06 PROCEDURE — 27000221 HC OXYGEN, UP TO 24 HOURS

## 2021-05-06 PROCEDURE — 93010 ELECTROCARDIOGRAM REPORT: CPT | Mod: ,,, | Performed by: INTERNAL MEDICINE

## 2021-05-06 PROCEDURE — 99900035 HC TECH TIME PER 15 MIN (STAT)

## 2021-05-06 PROCEDURE — 63600175 PHARM REV CODE 636 W HCPCS: Performed by: THORACIC SURGERY (CARDIOTHORACIC VASCULAR SURGERY)

## 2021-05-06 PROCEDURE — 85610 PROTHROMBIN TIME: CPT | Performed by: THORACIC SURGERY (CARDIOTHORACIC VASCULAR SURGERY)

## 2021-05-06 PROCEDURE — 63600175 PHARM REV CODE 636 W HCPCS: Performed by: STUDENT IN AN ORGANIZED HEALTH CARE EDUCATION/TRAINING PROGRAM

## 2021-05-06 PROCEDURE — 99232 PR SUBSEQUENT HOSPITAL CARE,LEVL II: ICD-10-PCS | Mod: ,,, | Performed by: ANESTHESIOLOGY

## 2021-05-06 PROCEDURE — 83735 ASSAY OF MAGNESIUM: CPT | Performed by: STUDENT IN AN ORGANIZED HEALTH CARE EDUCATION/TRAINING PROGRAM

## 2021-05-06 PROCEDURE — 85025 COMPLETE CBC W/AUTO DIFF WBC: CPT | Performed by: STUDENT IN AN ORGANIZED HEALTH CARE EDUCATION/TRAINING PROGRAM

## 2021-05-06 PROCEDURE — 25000003 PHARM REV CODE 250: Performed by: THORACIC SURGERY (CARDIOTHORACIC VASCULAR SURGERY)

## 2021-05-06 PROCEDURE — 93005 ELECTROCARDIOGRAM TRACING: CPT

## 2021-05-06 PROCEDURE — 93010 EKG 12-LEAD: ICD-10-PCS | Mod: ,,, | Performed by: INTERNAL MEDICINE

## 2021-05-06 PROCEDURE — 99233 PR SUBSEQUENT HOSPITAL CARE,LEVL III: ICD-10-PCS | Mod: ,,, | Performed by: INTERNAL MEDICINE

## 2021-05-06 RX ORDER — MIDODRINE HYDROCHLORIDE 5 MG/1
10 TABLET ORAL 3 TIMES DAILY
Status: DISCONTINUED | OUTPATIENT
Start: 2021-05-06 | End: 2021-05-13 | Stop reason: HOSPADM

## 2021-05-06 RX ORDER — METOPROLOL TARTRATE 100 MG/1
100 TABLET ORAL 3 TIMES DAILY
Status: DISCONTINUED | OUTPATIENT
Start: 2021-05-06 | End: 2021-05-13 | Stop reason: HOSPADM

## 2021-05-06 RX ORDER — MILRINONE LACTATE 0.2 MG/ML
0.25 INJECTION, SOLUTION INTRAVENOUS CONTINUOUS
Status: DISCONTINUED | OUTPATIENT
Start: 2021-05-06 | End: 2021-05-06

## 2021-05-06 RX ORDER — FUROSEMIDE 40 MG/1
80 TABLET ORAL 2 TIMES DAILY
Status: DISCONTINUED | OUTPATIENT
Start: 2021-05-06 | End: 2021-05-08

## 2021-05-06 RX ADMIN — VASOPRESSIN 0.04 UNITS/MIN: 20 INJECTION INTRAVENOUS at 08:05

## 2021-05-06 RX ADMIN — METOPROLOL TARTRATE 100 MG: 50 TABLET, FILM COATED ORAL at 08:05

## 2021-05-06 RX ADMIN — ACETAMINOPHEN 650 MG: 325 TABLET ORAL at 09:05

## 2021-05-06 RX ADMIN — ATORVASTATIN CALCIUM 40 MG: 20 TABLET, FILM COATED ORAL at 08:05

## 2021-05-06 RX ADMIN — POTASSIUM CHLORIDE 40 MEQ: 29.8 INJECTION, SOLUTION INTRAVENOUS at 04:05

## 2021-05-06 RX ADMIN — TAMSULOSIN HYDROCHLORIDE 0.4 MG: 0.4 CAPSULE ORAL at 08:05

## 2021-05-06 RX ADMIN — MIDODRINE HYDROCHLORIDE 10 MG: 5 TABLET ORAL at 08:05

## 2021-05-06 RX ADMIN — FAMOTIDINE 20 MG: 20 TABLET ORAL at 08:05

## 2021-05-06 RX ADMIN — SODIUM CHLORIDE 0.25 MCG/KG/MIN: 9 INJECTION, SOLUTION INTRAVENOUS at 12:05

## 2021-05-06 RX ADMIN — MAGNESIUM SULFATE 2 G: 2 INJECTION INTRAVENOUS at 04:05

## 2021-05-06 RX ADMIN — AMIODARONE HYDROCHLORIDE 200 MG: 200 TABLET ORAL at 08:05

## 2021-05-06 RX ADMIN — SODIUM CHLORIDE: 0.9 INJECTION, SOLUTION INTRAVENOUS at 04:05

## 2021-05-06 RX ADMIN — WARFARIN SODIUM 2.5 MG: 2.5 TABLET ORAL at 05:05

## 2021-05-06 RX ADMIN — FUROSEMIDE 80 MG: 40 TABLET ORAL at 08:05

## 2021-05-06 RX ADMIN — VASOPRESSIN 0.04 UNITS/MIN: 20 INJECTION INTRAVENOUS at 11:05

## 2021-05-06 RX ADMIN — METOPROLOL TARTRATE 100 MG: 50 TABLET, FILM COATED ORAL at 03:05

## 2021-05-06 RX ADMIN — FUROSEMIDE 80 MG: 40 TABLET ORAL at 05:05

## 2021-05-06 RX ADMIN — MIDODRINE HYDROCHLORIDE 10 MG: 5 TABLET ORAL at 05:05

## 2021-05-06 RX ADMIN — ACETAMINOPHEN 650 MG: 325 TABLET ORAL at 06:05

## 2021-05-06 RX ADMIN — ASPIRIN 81 MG: 81 TABLET, COATED ORAL at 08:05

## 2021-05-06 RX ADMIN — SODIUM CHLORIDE 0.25 MCG/KG/MIN: 9 INJECTION, SOLUTION INTRAVENOUS at 01:05

## 2021-05-06 RX ADMIN — MILRINONE LACTATE IN DEXTROSE 0.25 MCG/KG/MIN: 200 INJECTION, SOLUTION INTRAVENOUS at 05:05

## 2021-05-07 LAB
ANION GAP SERPL CALC-SCNC: 9 MMOL/L (ref 8–16)
BASOPHILS # BLD AUTO: 0.04 K/UL (ref 0–0.2)
BASOPHILS NFR BLD: 0.6 % (ref 0–1.9)
BUN SERPL-MCNC: 33 MG/DL (ref 8–23)
CALCIUM SERPL-MCNC: 7.6 MG/DL (ref 8.7–10.5)
CHLORIDE SERPL-SCNC: 97 MMOL/L (ref 95–110)
CO2 SERPL-SCNC: 24 MMOL/L (ref 23–29)
CREAT SERPL-MCNC: 1.3 MG/DL (ref 0.5–1.4)
DIFFERENTIAL METHOD: ABNORMAL
EOSINOPHIL # BLD AUTO: 0.3 K/UL (ref 0–0.5)
EOSINOPHIL NFR BLD: 4 % (ref 0–8)
ERYTHROCYTE [DISTWIDTH] IN BLOOD BY AUTOMATED COUNT: 14.1 % (ref 11.5–14.5)
EST. GFR  (AFRICAN AMERICAN): >60 ML/MIN/1.73 M^2
EST. GFR  (NON AFRICAN AMERICAN): 54.1 ML/MIN/1.73 M^2
GLUCOSE SERPL-MCNC: 97 MG/DL (ref 70–110)
HCT VFR BLD AUTO: 27.2 % (ref 40–54)
HGB BLD-MCNC: 9.4 G/DL (ref 14–18)
IMM GRANULOCYTES # BLD AUTO: 0.03 K/UL (ref 0–0.04)
IMM GRANULOCYTES NFR BLD AUTO: 0.5 % (ref 0–0.5)
INR PPP: 2.3 (ref 0.8–1.2)
LYMPHOCYTES # BLD AUTO: 1.3 K/UL (ref 1–4.8)
LYMPHOCYTES NFR BLD: 20.7 % (ref 18–48)
MAGNESIUM SERPL-MCNC: 1.7 MG/DL (ref 1.6–2.6)
MAGNESIUM SERPL-MCNC: 1.8 MG/DL (ref 1.6–2.6)
MCH RBC QN AUTO: 28.1 PG (ref 27–31)
MCHC RBC AUTO-ENTMCNC: 34.6 G/DL (ref 32–36)
MCV RBC AUTO: 81 FL (ref 82–98)
MONOCYTES # BLD AUTO: 0.5 K/UL (ref 0.3–1)
MONOCYTES NFR BLD: 8.4 % (ref 4–15)
NEUTROPHILS # BLD AUTO: 4.2 K/UL (ref 1.8–7.7)
NEUTROPHILS NFR BLD: 65.8 % (ref 38–73)
NRBC BLD-RTO: 0 /100 WBC
PHOSPHATE SERPL-MCNC: 2.4 MG/DL (ref 2.7–4.5)
PHOSPHATE SERPL-MCNC: 2.7 MG/DL (ref 2.7–4.5)
PLATELET # BLD AUTO: 160 K/UL (ref 150–450)
PMV BLD AUTO: 10.7 FL (ref 9.2–12.9)
POTASSIUM SERPL-SCNC: 3.4 MMOL/L (ref 3.5–5.1)
POTASSIUM SERPL-SCNC: 3.5 MMOL/L (ref 3.5–5.1)
PROTHROMBIN TIME: 23.8 SEC (ref 9–12.5)
RBC # BLD AUTO: 3.34 M/UL (ref 4.6–6.2)
SODIUM SERPL-SCNC: 130 MMOL/L (ref 136–145)
WBC # BLD AUTO: 6.44 K/UL (ref 3.9–12.7)

## 2021-05-07 PROCEDURE — 83735 ASSAY OF MAGNESIUM: CPT | Mod: 91 | Performed by: THORACIC SURGERY (CARDIOTHORACIC VASCULAR SURGERY)

## 2021-05-07 PROCEDURE — 93010 ELECTROCARDIOGRAM REPORT: CPT | Mod: ,,, | Performed by: INTERNAL MEDICINE

## 2021-05-07 PROCEDURE — 97165 OT EVAL LOW COMPLEX 30 MIN: CPT

## 2021-05-07 PROCEDURE — 25000003 PHARM REV CODE 250: Performed by: STUDENT IN AN ORGANIZED HEALTH CARE EDUCATION/TRAINING PROGRAM

## 2021-05-07 PROCEDURE — 94761 N-INVAS EAR/PLS OXIMETRY MLT: CPT

## 2021-05-07 PROCEDURE — 97116 GAIT TRAINING THERAPY: CPT

## 2021-05-07 PROCEDURE — 20000000 HC ICU ROOM

## 2021-05-07 PROCEDURE — 84100 ASSAY OF PHOSPHORUS: CPT | Performed by: STUDENT IN AN ORGANIZED HEALTH CARE EDUCATION/TRAINING PROGRAM

## 2021-05-07 PROCEDURE — 99233 PR SUBSEQUENT HOSPITAL CARE,LEVL III: ICD-10-PCS | Mod: ,,, | Performed by: INTERNAL MEDICINE

## 2021-05-07 PROCEDURE — 63600175 PHARM REV CODE 636 W HCPCS: Performed by: THORACIC SURGERY (CARDIOTHORACIC VASCULAR SURGERY)

## 2021-05-07 PROCEDURE — 93005 ELECTROCARDIOGRAM TRACING: CPT

## 2021-05-07 PROCEDURE — 97162 PT EVAL MOD COMPLEX 30 MIN: CPT

## 2021-05-07 PROCEDURE — 97535 SELF CARE MNGMENT TRAINING: CPT

## 2021-05-07 PROCEDURE — 84100 ASSAY OF PHOSPHORUS: CPT | Mod: 91 | Performed by: THORACIC SURGERY (CARDIOTHORACIC VASCULAR SURGERY)

## 2021-05-07 PROCEDURE — 85610 PROTHROMBIN TIME: CPT | Performed by: THORACIC SURGERY (CARDIOTHORACIC VASCULAR SURGERY)

## 2021-05-07 PROCEDURE — 99232 PR SUBSEQUENT HOSPITAL CARE,LEVL II: ICD-10-PCS | Mod: ,,, | Performed by: ANESTHESIOLOGY

## 2021-05-07 PROCEDURE — 99232 SBSQ HOSP IP/OBS MODERATE 35: CPT | Mod: ,,, | Performed by: ANESTHESIOLOGY

## 2021-05-07 PROCEDURE — 99233 SBSQ HOSP IP/OBS HIGH 50: CPT | Mod: ,,, | Performed by: INTERNAL MEDICINE

## 2021-05-07 PROCEDURE — 25000003 PHARM REV CODE 250: Performed by: THORACIC SURGERY (CARDIOTHORACIC VASCULAR SURGERY)

## 2021-05-07 PROCEDURE — 93010 EKG 12-LEAD: ICD-10-PCS | Mod: ,,, | Performed by: INTERNAL MEDICINE

## 2021-05-07 PROCEDURE — 80048 BASIC METABOLIC PNL TOTAL CA: CPT | Performed by: STUDENT IN AN ORGANIZED HEALTH CARE EDUCATION/TRAINING PROGRAM

## 2021-05-07 PROCEDURE — 84132 ASSAY OF SERUM POTASSIUM: CPT | Performed by: THORACIC SURGERY (CARDIOTHORACIC VASCULAR SURGERY)

## 2021-05-07 PROCEDURE — 63600175 PHARM REV CODE 636 W HCPCS: Performed by: STUDENT IN AN ORGANIZED HEALTH CARE EDUCATION/TRAINING PROGRAM

## 2021-05-07 PROCEDURE — 83735 ASSAY OF MAGNESIUM: CPT | Performed by: STUDENT IN AN ORGANIZED HEALTH CARE EDUCATION/TRAINING PROGRAM

## 2021-05-07 PROCEDURE — 85025 COMPLETE CBC W/AUTO DIFF WBC: CPT | Performed by: STUDENT IN AN ORGANIZED HEALTH CARE EDUCATION/TRAINING PROGRAM

## 2021-05-07 RX ORDER — WARFARIN 4 MG/1
4 TABLET ORAL DAILY
Status: DISCONTINUED | OUTPATIENT
Start: 2021-05-07 | End: 2021-05-09

## 2021-05-07 RX ORDER — METOLAZONE 5 MG/1
5 TABLET ORAL DAILY
Status: DISCONTINUED | OUTPATIENT
Start: 2021-05-07 | End: 2021-05-13 | Stop reason: HOSPADM

## 2021-05-07 RX ORDER — FAMOTIDINE 20 MG/1
20 TABLET, FILM COATED ORAL 2 TIMES DAILY
Status: DISCONTINUED | OUTPATIENT
Start: 2021-05-07 | End: 2021-05-08

## 2021-05-07 RX ADMIN — FAMOTIDINE 20 MG: 20 TABLET ORAL at 08:05

## 2021-05-07 RX ADMIN — MIDODRINE HYDROCHLORIDE 10 MG: 5 TABLET ORAL at 04:05

## 2021-05-07 RX ADMIN — FAMOTIDINE 20 MG: 20 TABLET, FILM COATED ORAL at 08:05

## 2021-05-07 RX ADMIN — VASOPRESSIN 0.04 UNITS/MIN: 20 INJECTION INTRAVENOUS at 07:05

## 2021-05-07 RX ADMIN — ASPIRIN 81 MG: 81 TABLET, COATED ORAL at 08:05

## 2021-05-07 RX ADMIN — MAGNESIUM SULFATE 2 G: 2 INJECTION INTRAVENOUS at 08:05

## 2021-05-07 RX ADMIN — METOPROLOL TARTRATE 100 MG: 50 TABLET, FILM COATED ORAL at 04:05

## 2021-05-07 RX ADMIN — METOPROLOL TARTRATE 100 MG: 50 TABLET, FILM COATED ORAL at 08:05

## 2021-05-07 RX ADMIN — AMIODARONE HYDROCHLORIDE 200 MG: 200 TABLET ORAL at 08:05

## 2021-05-07 RX ADMIN — FUROSEMIDE 80 MG: 40 TABLET ORAL at 08:05

## 2021-05-07 RX ADMIN — SODIUM CHLORIDE 0.25 MCG/KG/MIN: 9 INJECTION, SOLUTION INTRAVENOUS at 03:05

## 2021-05-07 RX ADMIN — SODIUM PHOSPHATE, MONOBASIC, MONOHYDRATE 15 MMOL: 276; 142 INJECTION, SOLUTION INTRAVENOUS at 07:05

## 2021-05-07 RX ADMIN — POTASSIUM CHLORIDE 40 MEQ: 29.8 INJECTION, SOLUTION INTRAVENOUS at 08:05

## 2021-05-07 RX ADMIN — MIDODRINE HYDROCHLORIDE 10 MG: 5 TABLET ORAL at 08:05

## 2021-05-07 RX ADMIN — ACETAMINOPHEN 650 MG: 325 TABLET ORAL at 11:05

## 2021-05-07 RX ADMIN — METOLAZONE 5 MG: 5 TABLET ORAL at 08:05

## 2021-05-07 RX ADMIN — TAMSULOSIN HYDROCHLORIDE 0.4 MG: 0.4 CAPSULE ORAL at 08:05

## 2021-05-07 RX ADMIN — VASOPRESSIN 0.02 UNITS/MIN: 20 INJECTION INTRAVENOUS at 03:05

## 2021-05-07 RX ADMIN — MAGNESIUM SULFATE 2 G: 2 INJECTION INTRAVENOUS at 05:05

## 2021-05-07 RX ADMIN — POTASSIUM CHLORIDE 20 MEQ: 14.9 INJECTION, SOLUTION INTRAVENOUS at 05:05

## 2021-05-07 RX ADMIN — WARFARIN SODIUM 4 MG: 4 TABLET ORAL at 04:05

## 2021-05-07 RX ADMIN — ATORVASTATIN CALCIUM 40 MG: 20 TABLET, FILM COATED ORAL at 08:05

## 2021-05-07 RX ADMIN — FUROSEMIDE 80 MG: 40 TABLET ORAL at 07:05

## 2021-05-08 LAB
ANION GAP SERPL CALC-SCNC: 12 MMOL/L (ref 8–16)
BASOPHILS # BLD AUTO: 0.07 K/UL (ref 0–0.2)
BASOPHILS NFR BLD: 1 % (ref 0–1.9)
BUN SERPL-MCNC: 35 MG/DL (ref 8–23)
CALCIUM SERPL-MCNC: 8.6 MG/DL (ref 8.7–10.5)
CHLORIDE SERPL-SCNC: 93 MMOL/L (ref 95–110)
CO2 SERPL-SCNC: 23 MMOL/L (ref 23–29)
CREAT SERPL-MCNC: 1.6 MG/DL (ref 0.5–1.4)
DIFFERENTIAL METHOD: ABNORMAL
EOSINOPHIL # BLD AUTO: 0.3 K/UL (ref 0–0.5)
EOSINOPHIL NFR BLD: 4.3 % (ref 0–8)
ERYTHROCYTE [DISTWIDTH] IN BLOOD BY AUTOMATED COUNT: 14.2 % (ref 11.5–14.5)
EST. GFR  (AFRICAN AMERICAN): 48.7 ML/MIN/1.73 M^2
EST. GFR  (NON AFRICAN AMERICAN): 42.1 ML/MIN/1.73 M^2
GLUCOSE SERPL-MCNC: 96 MG/DL (ref 70–110)
HCT VFR BLD AUTO: 29.9 % (ref 40–54)
HGB BLD-MCNC: 10.2 G/DL (ref 14–18)
IMM GRANULOCYTES # BLD AUTO: 0.05 K/UL (ref 0–0.04)
IMM GRANULOCYTES NFR BLD AUTO: 0.7 % (ref 0–0.5)
INR PPP: 2.4 (ref 0.8–1.2)
LYMPHOCYTES # BLD AUTO: 1.6 K/UL (ref 1–4.8)
LYMPHOCYTES NFR BLD: 24 % (ref 18–48)
MAGNESIUM SERPL-MCNC: 2.1 MG/DL (ref 1.6–2.6)
MCH RBC QN AUTO: 27.9 PG (ref 27–31)
MCHC RBC AUTO-ENTMCNC: 34.1 G/DL (ref 32–36)
MCV RBC AUTO: 82 FL (ref 82–98)
MONOCYTES # BLD AUTO: 0.6 K/UL (ref 0.3–1)
MONOCYTES NFR BLD: 8.4 % (ref 4–15)
NEUTROPHILS # BLD AUTO: 4.2 K/UL (ref 1.8–7.7)
NEUTROPHILS NFR BLD: 61.6 % (ref 38–73)
NRBC BLD-RTO: 0 /100 WBC
PHOSPHATE SERPL-MCNC: 3.2 MG/DL (ref 2.7–4.5)
PLATELET # BLD AUTO: 188 K/UL (ref 150–450)
PMV BLD AUTO: 10.2 FL (ref 9.2–12.9)
POTASSIUM SERPL-SCNC: 3.8 MMOL/L (ref 3.5–5.1)
PROTHROMBIN TIME: 25.2 SEC (ref 9–12.5)
RBC # BLD AUTO: 3.66 M/UL (ref 4.6–6.2)
SARS-COV-2 RNA RESP QL NAA+PROBE: NOT DETECTED
SODIUM SERPL-SCNC: 128 MMOL/L (ref 136–145)
WBC # BLD AUTO: 6.75 K/UL (ref 3.9–12.7)

## 2021-05-08 PROCEDURE — 94761 N-INVAS EAR/PLS OXIMETRY MLT: CPT

## 2021-05-08 PROCEDURE — 99232 PR SUBSEQUENT HOSPITAL CARE,LEVL II: ICD-10-PCS | Mod: ,,, | Performed by: ANESTHESIOLOGY

## 2021-05-08 PROCEDURE — 25000003 PHARM REV CODE 250: Performed by: STUDENT IN AN ORGANIZED HEALTH CARE EDUCATION/TRAINING PROGRAM

## 2021-05-08 PROCEDURE — 20000000 HC ICU ROOM

## 2021-05-08 PROCEDURE — 63600175 PHARM REV CODE 636 W HCPCS: Performed by: THORACIC SURGERY (CARDIOTHORACIC VASCULAR SURGERY)

## 2021-05-08 PROCEDURE — 85025 COMPLETE CBC W/AUTO DIFF WBC: CPT | Performed by: STUDENT IN AN ORGANIZED HEALTH CARE EDUCATION/TRAINING PROGRAM

## 2021-05-08 PROCEDURE — U0003 INFECTIOUS AGENT DETECTION BY NUCLEIC ACID (DNA OR RNA); SEVERE ACUTE RESPIRATORY SYNDROME CORONAVIRUS 2 (SARS-COV-2) (CORONAVIRUS DISEASE [COVID-19]), AMPLIFIED PROBE TECHNIQUE, MAKING USE OF HIGH THROUGHPUT TECHNOLOGIES AS DESCRIBED BY CMS-2020-01-R: HCPCS | Performed by: INTERNAL MEDICINE

## 2021-05-08 PROCEDURE — U0005 INFEC AGEN DETEC AMPLI PROBE: HCPCS | Performed by: INTERNAL MEDICINE

## 2021-05-08 PROCEDURE — 85610 PROTHROMBIN TIME: CPT | Performed by: THORACIC SURGERY (CARDIOTHORACIC VASCULAR SURGERY)

## 2021-05-08 PROCEDURE — 84100 ASSAY OF PHOSPHORUS: CPT | Performed by: STUDENT IN AN ORGANIZED HEALTH CARE EDUCATION/TRAINING PROGRAM

## 2021-05-08 PROCEDURE — 99232 SBSQ HOSP IP/OBS MODERATE 35: CPT | Mod: ,,, | Performed by: ANESTHESIOLOGY

## 2021-05-08 PROCEDURE — 80048 BASIC METABOLIC PNL TOTAL CA: CPT | Performed by: STUDENT IN AN ORGANIZED HEALTH CARE EDUCATION/TRAINING PROGRAM

## 2021-05-08 PROCEDURE — 83735 ASSAY OF MAGNESIUM: CPT | Performed by: STUDENT IN AN ORGANIZED HEALTH CARE EDUCATION/TRAINING PROGRAM

## 2021-05-08 PROCEDURE — 63600175 PHARM REV CODE 636 W HCPCS: Performed by: STUDENT IN AN ORGANIZED HEALTH CARE EDUCATION/TRAINING PROGRAM

## 2021-05-08 PROCEDURE — 25000003 PHARM REV CODE 250: Performed by: THORACIC SURGERY (CARDIOTHORACIC VASCULAR SURGERY)

## 2021-05-08 RX ORDER — IBUPROFEN 400 MG/1
400 TABLET ORAL ONCE AS NEEDED
Status: COMPLETED | OUTPATIENT
Start: 2021-05-09 | End: 2021-05-09

## 2021-05-08 RX ORDER — FUROSEMIDE 80 MG/1
80 TABLET ORAL DAILY
Status: DISCONTINUED | OUTPATIENT
Start: 2021-05-08 | End: 2021-05-13 | Stop reason: HOSPADM

## 2021-05-08 RX ORDER — FAMOTIDINE 20 MG/1
20 TABLET, FILM COATED ORAL DAILY
Status: DISCONTINUED | OUTPATIENT
Start: 2021-05-09 | End: 2021-05-13 | Stop reason: HOSPADM

## 2021-05-08 RX ADMIN — ATORVASTATIN CALCIUM 40 MG: 20 TABLET, FILM COATED ORAL at 10:05

## 2021-05-08 RX ADMIN — ACETAMINOPHEN 650 MG: 325 TABLET ORAL at 12:05

## 2021-05-08 RX ADMIN — METOPROLOL TARTRATE 100 MG: 50 TABLET, FILM COATED ORAL at 10:05

## 2021-05-08 RX ADMIN — ACETAMINOPHEN 650 MG: 325 TABLET ORAL at 10:05

## 2021-05-08 RX ADMIN — METOPROLOL TARTRATE 100 MG: 50 TABLET, FILM COATED ORAL at 08:05

## 2021-05-08 RX ADMIN — SODIUM CHLORIDE 0.12 MCG/KG/MIN: 9 INJECTION, SOLUTION INTRAVENOUS at 07:05

## 2021-05-08 RX ADMIN — ACETAMINOPHEN 650 MG: 325 TABLET ORAL at 01:05

## 2021-05-08 RX ADMIN — ASPIRIN 81 MG: 81 TABLET, COATED ORAL at 08:05

## 2021-05-08 RX ADMIN — MIDODRINE HYDROCHLORIDE 10 MG: 5 TABLET ORAL at 03:05

## 2021-05-08 RX ADMIN — MIDODRINE HYDROCHLORIDE 10 MG: 5 TABLET ORAL at 08:05

## 2021-05-08 RX ADMIN — MIDODRINE HYDROCHLORIDE 10 MG: 5 TABLET ORAL at 10:05

## 2021-05-08 RX ADMIN — AMIODARONE HYDROCHLORIDE 200 MG: 200 TABLET ORAL at 08:05

## 2021-05-08 RX ADMIN — FAMOTIDINE 20 MG: 20 TABLET, FILM COATED ORAL at 08:05

## 2021-05-08 RX ADMIN — FUROSEMIDE 80 MG: 80 TABLET ORAL at 08:05

## 2021-05-08 RX ADMIN — WARFARIN SODIUM 4 MG: 4 TABLET ORAL at 05:05

## 2021-05-08 RX ADMIN — METOLAZONE 5 MG: 5 TABLET ORAL at 08:05

## 2021-05-08 RX ADMIN — AMIODARONE HYDROCHLORIDE 200 MG: 200 TABLET ORAL at 10:05

## 2021-05-08 RX ADMIN — POTASSIUM CHLORIDE 40 MEQ: 29.8 INJECTION, SOLUTION INTRAVENOUS at 05:05

## 2021-05-08 RX ADMIN — TAMSULOSIN HYDROCHLORIDE 0.4 MG: 0.4 CAPSULE ORAL at 08:05

## 2021-05-08 RX ADMIN — METOPROLOL TARTRATE 100 MG: 50 TABLET, FILM COATED ORAL at 03:05

## 2021-05-09 PROBLEM — I48.91 AF (ATRIAL FIBRILLATION): Status: ACTIVE | Noted: 2021-05-02

## 2021-05-09 LAB
ALBUMIN SERPL BCP-MCNC: 2.8 G/DL (ref 3.5–5.2)
ALP SERPL-CCNC: 138 U/L (ref 55–135)
ALT SERPL W/O P-5'-P-CCNC: 17 U/L (ref 10–44)
ANION GAP SERPL CALC-SCNC: 10 MMOL/L (ref 8–16)
ANION GAP SERPL CALC-SCNC: 11 MMOL/L (ref 8–16)
AST SERPL-CCNC: 20 U/L (ref 10–40)
BASOPHILS # BLD AUTO: 0.08 K/UL (ref 0–0.2)
BASOPHILS NFR BLD: 1.1 % (ref 0–1.9)
BILIRUB SERPL-MCNC: 0.8 MG/DL (ref 0.1–1)
BUN SERPL-MCNC: 41 MG/DL (ref 8–23)
BUN SERPL-MCNC: 44 MG/DL (ref 8–23)
CALCIUM SERPL-MCNC: 8.3 MG/DL (ref 8.7–10.5)
CALCIUM SERPL-MCNC: 8.5 MG/DL (ref 8.7–10.5)
CHLORIDE SERPL-SCNC: 94 MMOL/L (ref 95–110)
CHLORIDE SERPL-SCNC: 94 MMOL/L (ref 95–110)
CO2 SERPL-SCNC: 25 MMOL/L (ref 23–29)
CO2 SERPL-SCNC: 25 MMOL/L (ref 23–29)
CREAT SERPL-MCNC: 1.8 MG/DL (ref 0.5–1.4)
CREAT SERPL-MCNC: 1.9 MG/DL (ref 0.5–1.4)
DIFFERENTIAL METHOD: ABNORMAL
EOSINOPHIL # BLD AUTO: 0.2 K/UL (ref 0–0.5)
EOSINOPHIL NFR BLD: 2.6 % (ref 0–8)
ERYTHROCYTE [DISTWIDTH] IN BLOOD BY AUTOMATED COUNT: 14.3 % (ref 11.5–14.5)
EST. GFR  (AFRICAN AMERICAN): 39.6 ML/MIN/1.73 M^2
EST. GFR  (AFRICAN AMERICAN): 42.2 ML/MIN/1.73 M^2
EST. GFR  (NON AFRICAN AMERICAN): 34.2 ML/MIN/1.73 M^2
EST. GFR  (NON AFRICAN AMERICAN): 36.5 ML/MIN/1.73 M^2
GLUCOSE SERPL-MCNC: 137 MG/DL (ref 70–110)
GLUCOSE SERPL-MCNC: 88 MG/DL (ref 70–110)
HCT VFR BLD AUTO: 32 % (ref 40–54)
HGB BLD-MCNC: 10.8 G/DL (ref 14–18)
IMM GRANULOCYTES # BLD AUTO: 0.03 K/UL (ref 0–0.04)
IMM GRANULOCYTES NFR BLD AUTO: 0.4 % (ref 0–0.5)
INR PPP: 3.1 (ref 0.8–1.2)
LYMPHOCYTES # BLD AUTO: 1.7 K/UL (ref 1–4.8)
LYMPHOCYTES NFR BLD: 24.1 % (ref 18–48)
MAGNESIUM SERPL-MCNC: 1.9 MG/DL (ref 1.6–2.6)
MCH RBC QN AUTO: 27.7 PG (ref 27–31)
MCHC RBC AUTO-ENTMCNC: 33.8 G/DL (ref 32–36)
MCV RBC AUTO: 82 FL (ref 82–98)
MONOCYTES # BLD AUTO: 0.6 K/UL (ref 0.3–1)
MONOCYTES NFR BLD: 8.9 % (ref 4–15)
NEUTROPHILS # BLD AUTO: 4.4 K/UL (ref 1.8–7.7)
NEUTROPHILS NFR BLD: 62.9 % (ref 38–73)
NRBC BLD-RTO: 0 /100 WBC
PHOSPHATE SERPL-MCNC: 4 MG/DL (ref 2.7–4.5)
PLATELET # BLD AUTO: 206 K/UL (ref 150–450)
PMV BLD AUTO: 9.8 FL (ref 9.2–12.9)
POTASSIUM SERPL-SCNC: 3.8 MMOL/L (ref 3.5–5.1)
POTASSIUM SERPL-SCNC: 4 MMOL/L (ref 3.5–5.1)
PROT SERPL-MCNC: 6.9 G/DL (ref 6–8.4)
PROTHROMBIN TIME: 31.4 SEC (ref 9–12.5)
RBC # BLD AUTO: 3.9 M/UL (ref 4.6–6.2)
SARS-COV-2 RDRP RESP QL NAA+PROBE: NEGATIVE
SODIUM SERPL-SCNC: 129 MMOL/L (ref 136–145)
SODIUM SERPL-SCNC: 130 MMOL/L (ref 136–145)
WBC # BLD AUTO: 7.04 K/UL (ref 3.9–12.7)

## 2021-05-09 PROCEDURE — 20000000 HC ICU ROOM

## 2021-05-09 PROCEDURE — U0002 COVID-19 LAB TEST NON-CDC: HCPCS | Performed by: STUDENT IN AN ORGANIZED HEALTH CARE EDUCATION/TRAINING PROGRAM

## 2021-05-09 PROCEDURE — 84100 ASSAY OF PHOSPHORUS: CPT | Performed by: STUDENT IN AN ORGANIZED HEALTH CARE EDUCATION/TRAINING PROGRAM

## 2021-05-09 PROCEDURE — 86900 BLOOD TYPING SEROLOGIC ABO: CPT | Performed by: STUDENT IN AN ORGANIZED HEALTH CARE EDUCATION/TRAINING PROGRAM

## 2021-05-09 PROCEDURE — 63600175 PHARM REV CODE 636 W HCPCS: Performed by: STUDENT IN AN ORGANIZED HEALTH CARE EDUCATION/TRAINING PROGRAM

## 2021-05-09 PROCEDURE — 94761 N-INVAS EAR/PLS OXIMETRY MLT: CPT

## 2021-05-09 PROCEDURE — 99232 PR SUBSEQUENT HOSPITAL CARE,LEVL II: ICD-10-PCS | Mod: ,,, | Performed by: ANESTHESIOLOGY

## 2021-05-09 PROCEDURE — 83735 ASSAY OF MAGNESIUM: CPT | Performed by: STUDENT IN AN ORGANIZED HEALTH CARE EDUCATION/TRAINING PROGRAM

## 2021-05-09 PROCEDURE — 25000003 PHARM REV CODE 250: Performed by: STUDENT IN AN ORGANIZED HEALTH CARE EDUCATION/TRAINING PROGRAM

## 2021-05-09 PROCEDURE — 85025 COMPLETE CBC W/AUTO DIFF WBC: CPT | Performed by: STUDENT IN AN ORGANIZED HEALTH CARE EDUCATION/TRAINING PROGRAM

## 2021-05-09 PROCEDURE — 99232 SBSQ HOSP IP/OBS MODERATE 35: CPT | Mod: ,,, | Performed by: ANESTHESIOLOGY

## 2021-05-09 PROCEDURE — 80048 BASIC METABOLIC PNL TOTAL CA: CPT | Performed by: STUDENT IN AN ORGANIZED HEALTH CARE EDUCATION/TRAINING PROGRAM

## 2021-05-09 PROCEDURE — 25000003 PHARM REV CODE 250: Performed by: THORACIC SURGERY (CARDIOTHORACIC VASCULAR SURGERY)

## 2021-05-09 PROCEDURE — 85610 PROTHROMBIN TIME: CPT | Performed by: THORACIC SURGERY (CARDIOTHORACIC VASCULAR SURGERY)

## 2021-05-09 PROCEDURE — 80053 COMPREHEN METABOLIC PANEL: CPT | Performed by: THORACIC SURGERY (CARDIOTHORACIC VASCULAR SURGERY)

## 2021-05-09 RX ADMIN — IBUPROFEN 400 MG: 400 TABLET, FILM COATED ORAL at 12:05

## 2021-05-09 RX ADMIN — ACETAMINOPHEN 650 MG: 325 TABLET ORAL at 01:05

## 2021-05-09 RX ADMIN — POTASSIUM CHLORIDE 40 MEQ: 29.8 INJECTION, SOLUTION INTRAVENOUS at 06:05

## 2021-05-09 RX ADMIN — METOLAZONE 5 MG: 5 TABLET ORAL at 08:05

## 2021-05-09 RX ADMIN — AMIODARONE HYDROCHLORIDE 200 MG: 200 TABLET ORAL at 09:05

## 2021-05-09 RX ADMIN — WARFARIN SODIUM 3 MG: 2.5 TABLET ORAL at 04:05

## 2021-05-09 RX ADMIN — METOPROLOL TARTRATE 100 MG: 50 TABLET, FILM COATED ORAL at 02:05

## 2021-05-09 RX ADMIN — METOPROLOL TARTRATE 100 MG: 50 TABLET, FILM COATED ORAL at 08:05

## 2021-05-09 RX ADMIN — MIDODRINE HYDROCHLORIDE 10 MG: 5 TABLET ORAL at 08:05

## 2021-05-09 RX ADMIN — ATORVASTATIN CALCIUM 40 MG: 20 TABLET, FILM COATED ORAL at 09:05

## 2021-05-09 RX ADMIN — FUROSEMIDE 80 MG: 80 TABLET ORAL at 08:05

## 2021-05-09 RX ADMIN — METOPROLOL TARTRATE 100 MG: 50 TABLET, FILM COATED ORAL at 09:05

## 2021-05-09 RX ADMIN — MIDODRINE HYDROCHLORIDE 10 MG: 5 TABLET ORAL at 02:05

## 2021-05-09 RX ADMIN — TAMSULOSIN HYDROCHLORIDE 0.4 MG: 0.4 CAPSULE ORAL at 08:05

## 2021-05-09 RX ADMIN — FAMOTIDINE 20 MG: 20 TABLET ORAL at 08:05

## 2021-05-09 RX ADMIN — ACETAMINOPHEN 650 MG: 325 TABLET ORAL at 09:05

## 2021-05-09 RX ADMIN — AMIODARONE HYDROCHLORIDE 200 MG: 200 TABLET ORAL at 08:05

## 2021-05-09 RX ADMIN — MIDODRINE HYDROCHLORIDE 10 MG: 5 TABLET ORAL at 09:05

## 2021-05-09 RX ADMIN — ASPIRIN 81 MG: 81 TABLET, COATED ORAL at 08:05

## 2021-05-10 LAB
ABO + RH BLD: NORMAL
ANION GAP SERPL CALC-SCNC: 8 MMOL/L (ref 8–16)
ANION GAP SERPL CALC-SCNC: 9 MMOL/L (ref 8–16)
BASOPHILS # BLD AUTO: 0.1 K/UL (ref 0–0.2)
BASOPHILS NFR BLD: 1.7 % (ref 0–1.9)
BLD GP AB SCN CELLS X3 SERPL QL: NORMAL
BUN SERPL-MCNC: 39 MG/DL (ref 8–23)
BUN SERPL-MCNC: 42 MG/DL (ref 8–23)
CALCIUM SERPL-MCNC: 7.4 MG/DL (ref 8.7–10.5)
CALCIUM SERPL-MCNC: 8.8 MG/DL (ref 8.7–10.5)
CHLORIDE SERPL-SCNC: 95 MMOL/L (ref 95–110)
CHLORIDE SERPL-SCNC: 99 MMOL/L (ref 95–110)
CO2 SERPL-SCNC: 21 MMOL/L (ref 23–29)
CO2 SERPL-SCNC: 24 MMOL/L (ref 23–29)
CREAT SERPL-MCNC: 1.5 MG/DL (ref 0.5–1.4)
CREAT SERPL-MCNC: 1.8 MG/DL (ref 0.5–1.4)
DIFFERENTIAL METHOD: ABNORMAL
EOSINOPHIL # BLD AUTO: 0.2 K/UL (ref 0–0.5)
EOSINOPHIL NFR BLD: 4 % (ref 0–8)
ERYTHROCYTE [DISTWIDTH] IN BLOOD BY AUTOMATED COUNT: 14.4 % (ref 11.5–14.5)
EST. GFR  (AFRICAN AMERICAN): 42.2 ML/MIN/1.73 M^2
EST. GFR  (AFRICAN AMERICAN): 52.6 ML/MIN/1.73 M^2
EST. GFR  (NON AFRICAN AMERICAN): 36.5 ML/MIN/1.73 M^2
EST. GFR  (NON AFRICAN AMERICAN): 45.5 ML/MIN/1.73 M^2
GLUCOSE SERPL-MCNC: 132 MG/DL (ref 70–110)
GLUCOSE SERPL-MCNC: 187 MG/DL (ref 70–110)
HCT VFR BLD AUTO: 27.9 % (ref 40–54)
HGB BLD-MCNC: 9.4 G/DL (ref 14–18)
IMM GRANULOCYTES # BLD AUTO: 0.03 K/UL (ref 0–0.04)
IMM GRANULOCYTES NFR BLD AUTO: 0.5 % (ref 0–0.5)
INR PPP: 3.8 (ref 0.8–1.2)
LYMPHOCYTES # BLD AUTO: 1.7 K/UL (ref 1–4.8)
LYMPHOCYTES NFR BLD: 27.6 % (ref 18–48)
MAGNESIUM SERPL-MCNC: 1.7 MG/DL (ref 1.6–2.6)
MCH RBC QN AUTO: 26.8 PG (ref 27–31)
MCHC RBC AUTO-ENTMCNC: 33.7 G/DL (ref 32–36)
MCV RBC AUTO: 80 FL (ref 82–98)
MONOCYTES # BLD AUTO: 0.5 K/UL (ref 0.3–1)
MONOCYTES NFR BLD: 7.6 % (ref 4–15)
NEUTROPHILS # BLD AUTO: 3.6 K/UL (ref 1.8–7.7)
NEUTROPHILS NFR BLD: 58.6 % (ref 38–73)
NRBC BLD-RTO: 0 /100 WBC
PHOSPHATE SERPL-MCNC: 3 MG/DL (ref 2.7–4.5)
PLATELET # BLD AUTO: 201 K/UL (ref 150–450)
PMV BLD AUTO: 9.7 FL (ref 9.2–12.9)
POTASSIUM SERPL-SCNC: 3 MMOL/L (ref 3.5–5.1)
POTASSIUM SERPL-SCNC: 4.5 MMOL/L (ref 3.5–5.1)
PROTHROMBIN TIME: 38.3 SEC (ref 9–12.5)
RBC # BLD AUTO: 3.51 M/UL (ref 4.6–6.2)
SODIUM SERPL-SCNC: 127 MMOL/L (ref 136–145)
SODIUM SERPL-SCNC: 129 MMOL/L (ref 136–145)
WBC # BLD AUTO: 6.05 K/UL (ref 3.9–12.7)

## 2021-05-10 PROCEDURE — 25000003 PHARM REV CODE 250: Performed by: THORACIC SURGERY (CARDIOTHORACIC VASCULAR SURGERY)

## 2021-05-10 PROCEDURE — 85610 PROTHROMBIN TIME: CPT | Performed by: THORACIC SURGERY (CARDIOTHORACIC VASCULAR SURGERY)

## 2021-05-10 PROCEDURE — 80048 BASIC METABOLIC PNL TOTAL CA: CPT | Performed by: STUDENT IN AN ORGANIZED HEALTH CARE EDUCATION/TRAINING PROGRAM

## 2021-05-10 PROCEDURE — 63600175 PHARM REV CODE 636 W HCPCS: Performed by: SURGERY

## 2021-05-10 PROCEDURE — 20000000 HC ICU ROOM

## 2021-05-10 PROCEDURE — 63600175 PHARM REV CODE 636 W HCPCS: Performed by: STUDENT IN AN ORGANIZED HEALTH CARE EDUCATION/TRAINING PROGRAM

## 2021-05-10 PROCEDURE — 25000003 PHARM REV CODE 250: Performed by: STUDENT IN AN ORGANIZED HEALTH CARE EDUCATION/TRAINING PROGRAM

## 2021-05-10 PROCEDURE — 80048 BASIC METABOLIC PNL TOTAL CA: CPT | Mod: 91 | Performed by: STUDENT IN AN ORGANIZED HEALTH CARE EDUCATION/TRAINING PROGRAM

## 2021-05-10 PROCEDURE — 99499 NO LOS: ICD-10-PCS | Mod: ,,, | Performed by: INTERNAL MEDICINE

## 2021-05-10 PROCEDURE — 27200188 HC TRANSDUCER, ART ADULT/PEDS

## 2021-05-10 PROCEDURE — 84100 ASSAY OF PHOSPHORUS: CPT | Performed by: STUDENT IN AN ORGANIZED HEALTH CARE EDUCATION/TRAINING PROGRAM

## 2021-05-10 PROCEDURE — 93010 ELECTROCARDIOGRAM REPORT: CPT | Mod: ,,, | Performed by: INTERNAL MEDICINE

## 2021-05-10 PROCEDURE — 93010 EKG 12-LEAD: ICD-10-PCS | Mod: ,,, | Performed by: INTERNAL MEDICINE

## 2021-05-10 PROCEDURE — 99291 PR CRITICAL CARE, E/M 30-74 MINUTES: ICD-10-PCS | Mod: ,,, | Performed by: ANESTHESIOLOGY

## 2021-05-10 PROCEDURE — 99499 UNLISTED E&M SERVICE: CPT | Mod: ,,, | Performed by: INTERNAL MEDICINE

## 2021-05-10 PROCEDURE — 93005 ELECTROCARDIOGRAM TRACING: CPT

## 2021-05-10 PROCEDURE — 99291 CRITICAL CARE FIRST HOUR: CPT | Mod: ,,, | Performed by: ANESTHESIOLOGY

## 2021-05-10 PROCEDURE — 85025 COMPLETE CBC W/AUTO DIFF WBC: CPT | Performed by: STUDENT IN AN ORGANIZED HEALTH CARE EDUCATION/TRAINING PROGRAM

## 2021-05-10 PROCEDURE — 83735 ASSAY OF MAGNESIUM: CPT | Performed by: STUDENT IN AN ORGANIZED HEALTH CARE EDUCATION/TRAINING PROGRAM

## 2021-05-10 RX ORDER — WARFARIN 2.5 MG/1
2.5 TABLET ORAL DAILY
Status: DISCONTINUED | OUTPATIENT
Start: 2021-05-10 | End: 2021-05-11

## 2021-05-10 RX ORDER — POTASSIUM CHLORIDE 20 MEQ/1
40 TABLET, EXTENDED RELEASE ORAL 2 TIMES DAILY
Status: DISCONTINUED | OUTPATIENT
Start: 2021-05-10 | End: 2021-05-10

## 2021-05-10 RX ORDER — POTASSIUM CHLORIDE 20 MEQ/1
20 TABLET, EXTENDED RELEASE ORAL 2 TIMES DAILY
Status: DISCONTINUED | OUTPATIENT
Start: 2021-05-10 | End: 2021-05-12

## 2021-05-10 RX ORDER — POTASSIUM CHLORIDE 14.9 MG/ML
20 INJECTION INTRAVENOUS ONCE
Status: COMPLETED | OUTPATIENT
Start: 2021-05-10 | End: 2021-05-10

## 2021-05-10 RX ADMIN — ATORVASTATIN CALCIUM 40 MG: 20 TABLET, FILM COATED ORAL at 08:05

## 2021-05-10 RX ADMIN — POTASSIUM CHLORIDE 20 MEQ: 14.9 INJECTION, SOLUTION INTRAVENOUS at 01:05

## 2021-05-10 RX ADMIN — METOPROLOL TARTRATE 100 MG: 50 TABLET, FILM COATED ORAL at 08:05

## 2021-05-10 RX ADMIN — MIDODRINE HYDROCHLORIDE 10 MG: 5 TABLET ORAL at 08:05

## 2021-05-10 RX ADMIN — AMIODARONE HYDROCHLORIDE 200 MG: 200 TABLET ORAL at 08:05

## 2021-05-10 RX ADMIN — ACETAMINOPHEN 650 MG: 325 TABLET ORAL at 08:05

## 2021-05-10 RX ADMIN — WARFARIN SODIUM 2.5 MG: 2.5 TABLET ORAL at 04:05

## 2021-05-10 RX ADMIN — MIDODRINE HYDROCHLORIDE 10 MG: 5 TABLET ORAL at 04:05

## 2021-05-10 RX ADMIN — METOPROLOL TARTRATE 100 MG: 50 TABLET, FILM COATED ORAL at 04:05

## 2021-05-10 RX ADMIN — POTASSIUM CHLORIDE 60 MEQ: 14.9 INJECTION, SOLUTION INTRAVENOUS at 07:05

## 2021-05-10 RX ADMIN — METOLAZONE 5 MG: 5 TABLET ORAL at 08:05

## 2021-05-10 RX ADMIN — FUROSEMIDE 80 MG: 80 TABLET ORAL at 08:05

## 2021-05-10 RX ADMIN — FAMOTIDINE 20 MG: 20 TABLET ORAL at 08:05

## 2021-05-10 RX ADMIN — MAGNESIUM SULFATE 2 G: 2 INJECTION INTRAVENOUS at 07:05

## 2021-05-10 RX ADMIN — TAMSULOSIN HYDROCHLORIDE 0.4 MG: 0.4 CAPSULE ORAL at 08:05

## 2021-05-10 RX ADMIN — ASPIRIN 81 MG: 81 TABLET, COATED ORAL at 08:05

## 2021-05-10 RX ADMIN — POTASSIUM CHLORIDE 20 MEQ: 1500 TABLET, EXTENDED RELEASE ORAL at 08:05

## 2021-05-11 LAB
ANION GAP SERPL CALC-SCNC: 10 MMOL/L (ref 8–16)
BASOPHILS # BLD AUTO: 0.08 K/UL (ref 0–0.2)
BASOPHILS NFR BLD: 1 % (ref 0–1.9)
BUN SERPL-MCNC: 45 MG/DL (ref 8–23)
CALCIUM SERPL-MCNC: 8.7 MG/DL (ref 8.7–10.5)
CHLORIDE SERPL-SCNC: 97 MMOL/L (ref 95–110)
CO2 SERPL-SCNC: 23 MMOL/L (ref 23–29)
CREAT SERPL-MCNC: 1.8 MG/DL (ref 0.5–1.4)
DIFFERENTIAL METHOD: ABNORMAL
EOSINOPHIL # BLD AUTO: 0.2 K/UL (ref 0–0.5)
EOSINOPHIL NFR BLD: 2.6 % (ref 0–8)
ERYTHROCYTE [DISTWIDTH] IN BLOOD BY AUTOMATED COUNT: 14.6 % (ref 11.5–14.5)
EST. GFR  (AFRICAN AMERICAN): 42.2 ML/MIN/1.73 M^2
EST. GFR  (NON AFRICAN AMERICAN): 36.5 ML/MIN/1.73 M^2
GLUCOSE SERPL-MCNC: 145 MG/DL (ref 70–110)
HCT VFR BLD AUTO: 32.1 % (ref 40–54)
HGB BLD-MCNC: 10.6 G/DL (ref 14–18)
IMM GRANULOCYTES # BLD AUTO: 0.04 K/UL (ref 0–0.04)
IMM GRANULOCYTES NFR BLD AUTO: 0.5 % (ref 0–0.5)
INR PPP: 4.9 (ref 0.8–1.2)
LYMPHOCYTES # BLD AUTO: 1.6 K/UL (ref 1–4.8)
LYMPHOCYTES NFR BLD: 20.4 % (ref 18–48)
MAGNESIUM SERPL-MCNC: 2.2 MG/DL (ref 1.6–2.6)
MCH RBC QN AUTO: 27 PG (ref 27–31)
MCHC RBC AUTO-ENTMCNC: 33 G/DL (ref 32–36)
MCV RBC AUTO: 82 FL (ref 82–98)
MONOCYTES # BLD AUTO: 0.6 K/UL (ref 0.3–1)
MONOCYTES NFR BLD: 7.8 % (ref 4–15)
NEUTROPHILS # BLD AUTO: 5.3 K/UL (ref 1.8–7.7)
NEUTROPHILS NFR BLD: 67.7 % (ref 38–73)
NRBC BLD-RTO: 0 /100 WBC
PHOSPHATE SERPL-MCNC: 3.8 MG/DL (ref 2.7–4.5)
PLATELET # BLD AUTO: 252 K/UL (ref 150–450)
PMV BLD AUTO: 9.7 FL (ref 9.2–12.9)
POTASSIUM SERPL-SCNC: 4.1 MMOL/L (ref 3.5–5.1)
PROTHROMBIN TIME: 48.2 SEC (ref 9–12.5)
RBC # BLD AUTO: 3.92 M/UL (ref 4.6–6.2)
SODIUM SERPL-SCNC: 130 MMOL/L (ref 136–145)
WBC # BLD AUTO: 7.83 K/UL (ref 3.9–12.7)

## 2021-05-11 PROCEDURE — 99291 PR CRITICAL CARE, E/M 30-74 MINUTES: ICD-10-PCS | Mod: ,,, | Performed by: ANESTHESIOLOGY

## 2021-05-11 PROCEDURE — 94761 N-INVAS EAR/PLS OXIMETRY MLT: CPT

## 2021-05-11 PROCEDURE — 25000003 PHARM REV CODE 250: Performed by: THORACIC SURGERY (CARDIOTHORACIC VASCULAR SURGERY)

## 2021-05-11 PROCEDURE — 25000003 PHARM REV CODE 250: Performed by: STUDENT IN AN ORGANIZED HEALTH CARE EDUCATION/TRAINING PROGRAM

## 2021-05-11 PROCEDURE — 83735 ASSAY OF MAGNESIUM: CPT | Performed by: STUDENT IN AN ORGANIZED HEALTH CARE EDUCATION/TRAINING PROGRAM

## 2021-05-11 PROCEDURE — 20600001 HC STEP DOWN PRIVATE ROOM

## 2021-05-11 PROCEDURE — 84100 ASSAY OF PHOSPHORUS: CPT | Performed by: STUDENT IN AN ORGANIZED HEALTH CARE EDUCATION/TRAINING PROGRAM

## 2021-05-11 PROCEDURE — 99291 CRITICAL CARE FIRST HOUR: CPT | Mod: ,,, | Performed by: ANESTHESIOLOGY

## 2021-05-11 PROCEDURE — 85610 PROTHROMBIN TIME: CPT | Performed by: THORACIC SURGERY (CARDIOTHORACIC VASCULAR SURGERY)

## 2021-05-11 PROCEDURE — 80048 BASIC METABOLIC PNL TOTAL CA: CPT | Performed by: STUDENT IN AN ORGANIZED HEALTH CARE EDUCATION/TRAINING PROGRAM

## 2021-05-11 PROCEDURE — 85025 COMPLETE CBC W/AUTO DIFF WBC: CPT | Performed by: STUDENT IN AN ORGANIZED HEALTH CARE EDUCATION/TRAINING PROGRAM

## 2021-05-11 RX ADMIN — POTASSIUM CHLORIDE 20 MEQ: 1500 TABLET, EXTENDED RELEASE ORAL at 08:05

## 2021-05-11 RX ADMIN — ASPIRIN 81 MG: 81 TABLET, COATED ORAL at 08:05

## 2021-05-11 RX ADMIN — MIDODRINE HYDROCHLORIDE 10 MG: 5 TABLET ORAL at 08:05

## 2021-05-11 RX ADMIN — MIDODRINE HYDROCHLORIDE 10 MG: 5 TABLET ORAL at 02:05

## 2021-05-11 RX ADMIN — TAMSULOSIN HYDROCHLORIDE 0.4 MG: 0.4 CAPSULE ORAL at 08:05

## 2021-05-11 RX ADMIN — FAMOTIDINE 20 MG: 20 TABLET ORAL at 08:05

## 2021-05-11 RX ADMIN — METOPROLOL TARTRATE 100 MG: 50 TABLET, FILM COATED ORAL at 08:05

## 2021-05-11 RX ADMIN — METOLAZONE 5 MG: 5 TABLET ORAL at 08:05

## 2021-05-11 RX ADMIN — AMIODARONE HYDROCHLORIDE 200 MG: 200 TABLET ORAL at 08:05

## 2021-05-11 RX ADMIN — ACETAMINOPHEN 650 MG: 325 TABLET ORAL at 12:05

## 2021-05-11 RX ADMIN — WARFARIN SODIUM 1 MG: 1 TABLET ORAL at 04:05

## 2021-05-11 RX ADMIN — FUROSEMIDE 80 MG: 80 TABLET ORAL at 08:05

## 2021-05-11 RX ADMIN — METOPROLOL TARTRATE 100 MG: 50 TABLET, FILM COATED ORAL at 02:05

## 2021-05-11 RX ADMIN — ATORVASTATIN CALCIUM 40 MG: 20 TABLET, FILM COATED ORAL at 08:05

## 2021-05-12 PROBLEM — E44.0 MODERATE MALNUTRITION: Status: ACTIVE | Noted: 2021-05-12

## 2021-05-12 LAB
ANION GAP SERPL CALC-SCNC: 10 MMOL/L (ref 8–16)
BASOPHILS # BLD AUTO: 0.07 K/UL (ref 0–0.2)
BASOPHILS NFR BLD: 0.7 % (ref 0–1.9)
BLD PROD TYP BPU: NORMAL
BLD PROD TYP BPU: NORMAL
BLOOD UNIT EXPIRATION DATE: NORMAL
BLOOD UNIT EXPIRATION DATE: NORMAL
BLOOD UNIT TYPE CODE: 5100
BLOOD UNIT TYPE CODE: 5100
BLOOD UNIT TYPE: NORMAL
BLOOD UNIT TYPE: NORMAL
BUN SERPL-MCNC: 53 MG/DL (ref 8–23)
CALCIUM SERPL-MCNC: 9.2 MG/DL (ref 8.7–10.5)
CHLORIDE SERPL-SCNC: 96 MMOL/L (ref 95–110)
CO2 SERPL-SCNC: 26 MMOL/L (ref 23–29)
CODING SYSTEM: NORMAL
CODING SYSTEM: NORMAL
CREAT SERPL-MCNC: 2.5 MG/DL (ref 0.5–1.4)
DIFFERENTIAL METHOD: ABNORMAL
DISPENSE STATUS: NORMAL
DISPENSE STATUS: NORMAL
EOSINOPHIL # BLD AUTO: 0.1 K/UL (ref 0–0.5)
EOSINOPHIL NFR BLD: 0.5 % (ref 0–8)
ERYTHROCYTE [DISTWIDTH] IN BLOOD BY AUTOMATED COUNT: 14.6 % (ref 11.5–14.5)
EST. GFR  (AFRICAN AMERICAN): 28.4 ML/MIN/1.73 M^2
EST. GFR  (NON AFRICAN AMERICAN): 24.6 ML/MIN/1.73 M^2
GLUCOSE SERPL-MCNC: 119 MG/DL (ref 70–110)
HCT VFR BLD AUTO: 32.2 % (ref 40–54)
HGB BLD-MCNC: 10.6 G/DL (ref 14–18)
IMM GRANULOCYTES # BLD AUTO: 0.07 K/UL (ref 0–0.04)
IMM GRANULOCYTES NFR BLD AUTO: 0.7 % (ref 0–0.5)
INR PPP: 2.6 (ref 0.8–1.2)
INR PPP: 6.5 (ref 0.8–1.2)
LYMPHOCYTES # BLD AUTO: 1.6 K/UL (ref 1–4.8)
LYMPHOCYTES NFR BLD: 17.5 % (ref 18–48)
MAGNESIUM SERPL-MCNC: 2.2 MG/DL (ref 1.6–2.6)
MCH RBC QN AUTO: 27 PG (ref 27–31)
MCHC RBC AUTO-ENTMCNC: 32.9 G/DL (ref 32–36)
MCV RBC AUTO: 82 FL (ref 82–98)
MONOCYTES # BLD AUTO: 0.6 K/UL (ref 0.3–1)
MONOCYTES NFR BLD: 6.6 % (ref 4–15)
NEUTROPHILS # BLD AUTO: 6.9 K/UL (ref 1.8–7.7)
NEUTROPHILS NFR BLD: 74 % (ref 38–73)
NRBC BLD-RTO: 0 /100 WBC
NUM UNITS TRANS FFP: NORMAL
NUM UNITS TRANS FFP: NORMAL
PHOSPHATE SERPL-MCNC: 4 MG/DL (ref 2.7–4.5)
PLATELET # BLD AUTO: 234 K/UL (ref 150–450)
PMV BLD AUTO: 9 FL (ref 9.2–12.9)
POTASSIUM SERPL-SCNC: 4.4 MMOL/L (ref 3.5–5.1)
PROTHROMBIN TIME: 26.6 SEC (ref 9–12.5)
PROTHROMBIN TIME: 63.5 SEC (ref 9–12.5)
RBC # BLD AUTO: 3.92 M/UL (ref 4.6–6.2)
SODIUM SERPL-SCNC: 132 MMOL/L (ref 136–145)
WBC # BLD AUTO: 9.36 K/UL (ref 3.9–12.7)

## 2021-05-12 PROCEDURE — 25000003 PHARM REV CODE 250: Performed by: THORACIC SURGERY (CARDIOTHORACIC VASCULAR SURGERY)

## 2021-05-12 PROCEDURE — 36430 TRANSFUSION BLD/BLD COMPNT: CPT

## 2021-05-12 PROCEDURE — 25000003 PHARM REV CODE 250: Performed by: STUDENT IN AN ORGANIZED HEALTH CARE EDUCATION/TRAINING PROGRAM

## 2021-05-12 PROCEDURE — 25000003 PHARM REV CODE 250: Performed by: NURSE PRACTITIONER

## 2021-05-12 PROCEDURE — P9017 PLASMA 1 DONOR FRZ W/IN 8 HR: HCPCS | Performed by: NURSE PRACTITIONER

## 2021-05-12 PROCEDURE — 84100 ASSAY OF PHOSPHORUS: CPT | Performed by: STUDENT IN AN ORGANIZED HEALTH CARE EDUCATION/TRAINING PROGRAM

## 2021-05-12 PROCEDURE — 20600001 HC STEP DOWN PRIVATE ROOM

## 2021-05-12 PROCEDURE — 85025 COMPLETE CBC W/AUTO DIFF WBC: CPT | Performed by: STUDENT IN AN ORGANIZED HEALTH CARE EDUCATION/TRAINING PROGRAM

## 2021-05-12 PROCEDURE — 85610 PROTHROMBIN TIME: CPT | Mod: 91 | Performed by: NURSE PRACTITIONER

## 2021-05-12 PROCEDURE — 83735 ASSAY OF MAGNESIUM: CPT | Performed by: STUDENT IN AN ORGANIZED HEALTH CARE EDUCATION/TRAINING PROGRAM

## 2021-05-12 PROCEDURE — 80048 BASIC METABOLIC PNL TOTAL CA: CPT | Performed by: STUDENT IN AN ORGANIZED HEALTH CARE EDUCATION/TRAINING PROGRAM

## 2021-05-12 PROCEDURE — 85610 PROTHROMBIN TIME: CPT | Performed by: THORACIC SURGERY (CARDIOTHORACIC VASCULAR SURGERY)

## 2021-05-12 RX ORDER — HYDROCODONE BITARTRATE AND ACETAMINOPHEN 500; 5 MG/1; MG/1
TABLET ORAL
Status: DISCONTINUED | OUTPATIENT
Start: 2021-05-12 | End: 2021-05-13 | Stop reason: HOSPADM

## 2021-05-12 RX ORDER — POTASSIUM CHLORIDE 20 MEQ/1
40 TABLET, EXTENDED RELEASE ORAL DAILY
Status: DISCONTINUED | OUTPATIENT
Start: 2021-05-12 | End: 2021-05-12

## 2021-05-12 RX ORDER — POTASSIUM CHLORIDE 20 MEQ/1
20 TABLET, EXTENDED RELEASE ORAL DAILY
Status: DISCONTINUED | OUTPATIENT
Start: 2021-05-12 | End: 2021-05-13 | Stop reason: HOSPADM

## 2021-05-12 RX ADMIN — POTASSIUM CHLORIDE 20 MEQ: 1500 TABLET, EXTENDED RELEASE ORAL at 08:05

## 2021-05-12 RX ADMIN — ASPIRIN 81 MG: 81 TABLET, COATED ORAL at 08:05

## 2021-05-12 RX ADMIN — METOPROLOL TARTRATE 100 MG: 50 TABLET, FILM COATED ORAL at 08:05

## 2021-05-12 RX ADMIN — MIDODRINE HYDROCHLORIDE 10 MG: 5 TABLET ORAL at 08:05

## 2021-05-12 RX ADMIN — FAMOTIDINE 20 MG: 20 TABLET ORAL at 08:05

## 2021-05-12 RX ADMIN — METOLAZONE 5 MG: 5 TABLET ORAL at 08:05

## 2021-05-12 RX ADMIN — AMIODARONE HYDROCHLORIDE 200 MG: 200 TABLET ORAL at 08:05

## 2021-05-12 RX ADMIN — ATORVASTATIN CALCIUM 40 MG: 20 TABLET, FILM COATED ORAL at 08:05

## 2021-05-12 RX ADMIN — METOPROLOL TARTRATE 100 MG: 50 TABLET, FILM COATED ORAL at 03:05

## 2021-05-12 RX ADMIN — MIDODRINE HYDROCHLORIDE 10 MG: 5 TABLET ORAL at 03:05

## 2021-05-12 RX ADMIN — FUROSEMIDE 80 MG: 80 TABLET ORAL at 08:05

## 2021-05-12 RX ADMIN — ACETAMINOPHEN 650 MG: 325 TABLET ORAL at 10:05

## 2021-05-12 RX ADMIN — TAMSULOSIN HYDROCHLORIDE 0.4 MG: 0.4 CAPSULE ORAL at 08:05

## 2021-05-13 ENCOUNTER — DOCUMENTATION ONLY (OUTPATIENT)
Dept: CARDIOTHORACIC SURGERY | Facility: CLINIC | Age: 76
End: 2021-05-13

## 2021-05-13 VITALS
TEMPERATURE: 98 F | OXYGEN SATURATION: 93 % | SYSTOLIC BLOOD PRESSURE: 107 MMHG | HEART RATE: 83 BPM | HEIGHT: 69 IN | BODY MASS INDEX: 26.32 KG/M2 | DIASTOLIC BLOOD PRESSURE: 53 MMHG | WEIGHT: 177.69 LBS | RESPIRATION RATE: 18 BRPM

## 2021-05-13 LAB
ANION GAP SERPL CALC-SCNC: 12 MMOL/L (ref 8–16)
ASCENDING AORTA: 3.34 CM
AV INDEX (PROSTH): 0.3
AV MEAN GRADIENT: 8 MMHG
AV PEAK GRADIENT: 15 MMHG
AV VALVE AREA: 1.22 CM2
AV VELOCITY RATIO: 0.34
BASOPHILS # BLD AUTO: 0.07 K/UL (ref 0–0.2)
BASOPHILS NFR BLD: 0.9 % (ref 0–1.9)
BSA FOR ECHO PROCEDURE: 2.09 M2
BUN SERPL-MCNC: 59 MG/DL (ref 8–23)
CALCIUM SERPL-MCNC: 9.3 MG/DL (ref 8.7–10.5)
CHLORIDE SERPL-SCNC: 95 MMOL/L (ref 95–110)
CO2 SERPL-SCNC: 25 MMOL/L (ref 23–29)
CREAT SERPL-MCNC: 2.6 MG/DL (ref 0.5–1.4)
CV ECHO LV RWT: 0.32 CM
DIFFERENTIAL METHOD: ABNORMAL
DOP CALC AO PEAK VEL: 1.92 M/S
DOP CALC AO VTI: 29.62 CM
DOP CALC LVOT AREA: 4 CM2
DOP CALC LVOT DIAMETER: 2.26 CM
DOP CALC LVOT PEAK VEL: 0.66 M/S
DOP CALC LVOT STROKE VOLUME: 36.01 CM3
DOP CALCLVOT PEAK VEL VTI: 8.98 CM
E WAVE DECELERATION TIME: 161.26 MSEC
E/A RATIO: 2.62
E/E' RATIO: 34.89 M/S
ECHO LV POSTERIOR WALL: 0.84 CM (ref 0.6–1.1)
EJECTION FRACTION: 25 %
EOSINOPHIL # BLD AUTO: 0.1 K/UL (ref 0–0.5)
EOSINOPHIL NFR BLD: 1.6 % (ref 0–8)
ERYTHROCYTE [DISTWIDTH] IN BLOOD BY AUTOMATED COUNT: 14.8 % (ref 11.5–14.5)
EST. GFR  (AFRICAN AMERICAN): 27.1 ML/MIN/1.73 M^2
EST. GFR  (NON AFRICAN AMERICAN): 23.4 ML/MIN/1.73 M^2
FRACTIONAL SHORTENING: 14 % (ref 28–44)
GLUCOSE SERPL-MCNC: 97 MG/DL (ref 70–110)
HCT VFR BLD AUTO: 31.1 % (ref 40–54)
HGB BLD-MCNC: 10.3 G/DL (ref 14–18)
IMM GRANULOCYTES # BLD AUTO: 0.05 K/UL (ref 0–0.04)
IMM GRANULOCYTES NFR BLD AUTO: 0.6 % (ref 0–0.5)
INR PPP: 2.8 (ref 0.8–1.2)
INTERVENTRICULAR SEPTUM: 0.95 CM (ref 0.6–1.1)
LA MAJOR: 6.57 CM
LA MINOR: 6.5 CM
LA WIDTH: 3.76 CM
LEFT ATRIUM SIZE: 4.95 CM
LEFT ATRIUM VOLUME INDEX MOD: 25.4 ML/M2
LEFT ATRIUM VOLUME INDEX: 52.7 ML/M2
LEFT ATRIUM VOLUME MOD: 49.78 CM3
LEFT ATRIUM VOLUME: 103.38 CM3
LEFT INTERNAL DIMENSION IN SYSTOLE: 4.56 CM (ref 2.1–4)
LEFT VENTRICLE DIASTOLIC VOLUME INDEX: 69.7 ML/M2
LEFT VENTRICLE DIASTOLIC VOLUME: 136.61 ML
LEFT VENTRICLE MASS INDEX: 89 G/M2
LEFT VENTRICLE SYSTOLIC VOLUME INDEX: 48.6 ML/M2
LEFT VENTRICLE SYSTOLIC VOLUME: 95.25 ML
LEFT VENTRICULAR INTERNAL DIMENSION IN DIASTOLE: 5.32 CM (ref 3.5–6)
LEFT VENTRICULAR MASS: 174.37 G
LV LATERAL E/E' RATIO: 31.4 M/S
LV SEPTAL E/E' RATIO: 39.25 M/S
LYMPHOCYTES # BLD AUTO: 1.8 K/UL (ref 1–4.8)
LYMPHOCYTES NFR BLD: 22.3 % (ref 18–48)
MAGNESIUM SERPL-MCNC: 2.1 MG/DL (ref 1.6–2.6)
MCH RBC QN AUTO: 26.5 PG (ref 27–31)
MCHC RBC AUTO-ENTMCNC: 33.1 G/DL (ref 32–36)
MCV RBC AUTO: 80 FL (ref 82–98)
MONOCYTES # BLD AUTO: 0.6 K/UL (ref 0.3–1)
MONOCYTES NFR BLD: 7.8 % (ref 4–15)
MV PEAK A VEL: 0.6 M/S
MV PEAK E VEL: 1.57 M/S
MV STENOSIS PRESSURE HALF TIME: 46.77 MS
MV VALVE AREA P 1/2 METHOD: 4.7 CM2
NEUTROPHILS # BLD AUTO: 5.3 K/UL (ref 1.8–7.7)
NEUTROPHILS NFR BLD: 66.8 % (ref 38–73)
NRBC BLD-RTO: 0 /100 WBC
PHOSPHATE SERPL-MCNC: 4.4 MG/DL (ref 2.7–4.5)
PISA MRMAX VEL: 0.05 M/S
PISA TR MAX VEL: 3.53 M/S
PLATELET # BLD AUTO: 248 K/UL (ref 150–450)
PMV BLD AUTO: 9.8 FL (ref 9.2–12.9)
POTASSIUM SERPL-SCNC: 4 MMOL/L (ref 3.5–5.1)
PROTHROMBIN TIME: 28.3 SEC (ref 9–12.5)
RA MAJOR: 4.75 CM
RA PRESSURE: 15 MMHG
RA WIDTH: 2.58 CM
RBC # BLD AUTO: 3.89 M/UL (ref 4.6–6.2)
RIGHT VENTRICULAR END-DIASTOLIC DIMENSION: 3.61 CM
SINUS: 3.46 CM
SODIUM SERPL-SCNC: 132 MMOL/L (ref 136–145)
STJ: 2.8 CM
TDI LATERAL: 0.05 M/S
TDI SEPTAL: 0.04 M/S
TDI: 0.05 M/S
TR MAX PG: 50 MMHG
TRICUSPID ANNULAR PLANE SYSTOLIC EXCURSION: 0.8 CM
TV REST PULMONARY ARTERY PRESSURE: 65 MMHG
WBC # BLD AUTO: 7.98 K/UL (ref 3.9–12.7)

## 2021-05-13 PROCEDURE — 85025 COMPLETE CBC W/AUTO DIFF WBC: CPT | Performed by: STUDENT IN AN ORGANIZED HEALTH CARE EDUCATION/TRAINING PROGRAM

## 2021-05-13 PROCEDURE — 25000003 PHARM REV CODE 250: Performed by: STUDENT IN AN ORGANIZED HEALTH CARE EDUCATION/TRAINING PROGRAM

## 2021-05-13 PROCEDURE — 85610 PROTHROMBIN TIME: CPT | Performed by: THORACIC SURGERY (CARDIOTHORACIC VASCULAR SURGERY)

## 2021-05-13 PROCEDURE — 25000003 PHARM REV CODE 250: Performed by: THORACIC SURGERY (CARDIOTHORACIC VASCULAR SURGERY)

## 2021-05-13 PROCEDURE — 36415 COLL VENOUS BLD VENIPUNCTURE: CPT | Performed by: THORACIC SURGERY (CARDIOTHORACIC VASCULAR SURGERY)

## 2021-05-13 PROCEDURE — 25000003 PHARM REV CODE 250: Performed by: NURSE PRACTITIONER

## 2021-05-13 PROCEDURE — 25500020 PHARM REV CODE 255: Performed by: INTERNAL MEDICINE

## 2021-05-13 PROCEDURE — 80048 BASIC METABOLIC PNL TOTAL CA: CPT | Performed by: STUDENT IN AN ORGANIZED HEALTH CARE EDUCATION/TRAINING PROGRAM

## 2021-05-13 PROCEDURE — 84100 ASSAY OF PHOSPHORUS: CPT | Performed by: STUDENT IN AN ORGANIZED HEALTH CARE EDUCATION/TRAINING PROGRAM

## 2021-05-13 PROCEDURE — 83735 ASSAY OF MAGNESIUM: CPT | Performed by: STUDENT IN AN ORGANIZED HEALTH CARE EDUCATION/TRAINING PROGRAM

## 2021-05-13 RX ORDER — AMIODARONE HYDROCHLORIDE 200 MG/1
200 TABLET ORAL 2 TIMES DAILY
Qty: 10 TABLET | Refills: 0 | Status: SHIPPED | OUTPATIENT
Start: 2021-05-13 | End: 2021-05-18

## 2021-05-13 RX ORDER — MIDODRINE HYDROCHLORIDE 10 MG/1
10 TABLET ORAL 3 TIMES DAILY
Qty: 90 TABLET | Refills: 11 | Status: ON HOLD | OUTPATIENT
Start: 2021-05-13 | End: 2021-06-08 | Stop reason: HOSPADM

## 2021-05-13 RX ORDER — METOPROLOL TARTRATE 100 MG/1
100 TABLET ORAL 3 TIMES DAILY
Qty: 90 TABLET | Refills: 11 | Status: ON HOLD | OUTPATIENT
Start: 2021-05-13 | End: 2021-06-08 | Stop reason: HOSPADM

## 2021-05-13 RX ORDER — METOLAZONE 5 MG/1
5 TABLET ORAL DAILY
Qty: 30 TABLET | Refills: 11 | Status: ON HOLD | OUTPATIENT
Start: 2021-05-15 | End: 2021-06-08 | Stop reason: HOSPADM

## 2021-05-13 RX ORDER — FUROSEMIDE 80 MG/1
80 TABLET ORAL DAILY
Qty: 90 TABLET | Refills: 0 | Status: ON HOLD | OUTPATIENT
Start: 2021-05-15 | End: 2021-06-08 | Stop reason: SDUPTHER

## 2021-05-13 RX ORDER — SPIRONOLACTONE 25 MG/1
25 TABLET ORAL DAILY
Qty: 30 TABLET | Refills: 11 | Status: ON HOLD | OUTPATIENT
Start: 2021-05-13 | End: 2021-06-08 | Stop reason: HOSPADM

## 2021-05-13 RX ORDER — DABIGATRAN ETEXILATE 75 MG/1
75 CAPSULE ORAL 2 TIMES DAILY
Qty: 60 CAPSULE | Refills: 0 | Status: ON HOLD | OUTPATIENT
Start: 2021-05-13 | End: 2021-06-08 | Stop reason: HOSPADM

## 2021-05-13 RX ORDER — POTASSIUM CHLORIDE 20 MEQ/1
20 TABLET, EXTENDED RELEASE ORAL DAILY
Qty: 30 TABLET | Refills: 1 | Status: ON HOLD | OUTPATIENT
Start: 2021-05-15 | End: 2021-06-08 | Stop reason: HOSPADM

## 2021-05-13 RX ORDER — SPIRONOLACTONE 25 MG/1
25 TABLET ORAL DAILY
Status: DISCONTINUED | OUTPATIENT
Start: 2021-05-13 | End: 2021-05-13 | Stop reason: HOSPADM

## 2021-05-13 RX ORDER — AMIODARONE HYDROCHLORIDE 200 MG/1
200 TABLET ORAL DAILY
Qty: 30 TABLET | Refills: 11 | Status: ON HOLD | OUTPATIENT
Start: 2021-05-18 | End: 2021-06-08 | Stop reason: HOSPADM

## 2021-05-13 RX ADMIN — POTASSIUM CHLORIDE 20 MEQ: 1500 TABLET, EXTENDED RELEASE ORAL at 09:05

## 2021-05-13 RX ADMIN — MIDODRINE HYDROCHLORIDE 10 MG: 5 TABLET ORAL at 08:05

## 2021-05-13 RX ADMIN — SPIRONOLACTONE 25 MG: 25 TABLET, FILM COATED ORAL at 08:05

## 2021-05-13 RX ADMIN — HUMAN ALBUMIN MICROSPHERES AND PERFLUTREN 0.66 MG: 10; .22 INJECTION, SOLUTION INTRAVENOUS at 11:05

## 2021-05-13 RX ADMIN — ASPIRIN 81 MG: 81 TABLET, COATED ORAL at 08:05

## 2021-05-13 RX ADMIN — FUROSEMIDE 80 MG: 80 TABLET ORAL at 08:05

## 2021-05-13 RX ADMIN — METOLAZONE 5 MG: 5 TABLET ORAL at 08:05

## 2021-05-13 RX ADMIN — METOPROLOL TARTRATE 100 MG: 50 TABLET, FILM COATED ORAL at 08:05

## 2021-05-13 RX ADMIN — TAMSULOSIN HYDROCHLORIDE 0.4 MG: 0.4 CAPSULE ORAL at 08:05

## 2021-05-13 RX ADMIN — AMIODARONE HYDROCHLORIDE 200 MG: 200 TABLET ORAL at 08:05

## 2021-05-13 RX ADMIN — FAMOTIDINE 20 MG: 20 TABLET ORAL at 08:05

## 2021-05-15 ENCOUNTER — PATIENT OUTREACH (OUTPATIENT)
Dept: ADMINISTRATIVE | Facility: CLINIC | Age: 76
End: 2021-05-15

## 2021-05-22 ENCOUNTER — HOSPITAL ENCOUNTER (INPATIENT)
Facility: HOSPITAL | Age: 76
LOS: 24 days | Discharge: NURSING, REHAB OR LONG-TERM CARE | DRG: 314 | End: 2021-06-16
Attending: EMERGENCY MEDICINE | Admitting: INTERNAL MEDICINE
Payer: OTHER GOVERNMENT

## 2021-05-22 DIAGNOSIS — E87.5 HYPERKALEMIA: ICD-10-CM

## 2021-05-22 DIAGNOSIS — I25.119 CORONARY ARTERY DISEASE INVOLVING NATIVE CORONARY ARTERY OF NATIVE HEART WITH ANGINA PECTORIS: ICD-10-CM

## 2021-05-22 DIAGNOSIS — I60.9 SUBARACHNOID HEMORRHAGE: Primary | ICD-10-CM

## 2021-05-22 DIAGNOSIS — E87.1 HYPONATREMIA: ICD-10-CM

## 2021-05-22 DIAGNOSIS — K31.811 UPPER GASTROINTESTINAL HEMORRHAGE DUE TO ANGIODYSPLASIA: ICD-10-CM

## 2021-05-22 DIAGNOSIS — N17.1 ACUTE RENAL FAILURE WITH ACUTE RENAL CORTICAL NECROSIS SUPERIMPOSED ON STAGE 3B CHRONIC KIDNEY DISEASE: ICD-10-CM

## 2021-05-22 DIAGNOSIS — N19 UREMIA: ICD-10-CM

## 2021-05-22 DIAGNOSIS — D68.9 COAGULOPATHY: ICD-10-CM

## 2021-05-22 DIAGNOSIS — B95.7 STAPHYLOCOCCUS EPIDERMIDIS BACTEREMIA: ICD-10-CM

## 2021-05-22 DIAGNOSIS — I49.9 ARRHYTHMIA: ICD-10-CM

## 2021-05-22 DIAGNOSIS — R94.31 ABNORMAL EKG: ICD-10-CM

## 2021-05-22 DIAGNOSIS — N18.32 ACUTE RENAL FAILURE WITH ACUTE RENAL CORTICAL NECROSIS SUPERIMPOSED ON STAGE 3B CHRONIC KIDNEY DISEASE: ICD-10-CM

## 2021-05-22 DIAGNOSIS — D62 ACUTE BLOOD LOSS ANEMIA: ICD-10-CM

## 2021-05-22 DIAGNOSIS — N19 RENAL FAILURE: ICD-10-CM

## 2021-05-22 DIAGNOSIS — E86.1 HYPOTENSION DUE TO HYPOVOLEMIA: ICD-10-CM

## 2021-05-22 DIAGNOSIS — I95.9 HYPOTENSION: ICD-10-CM

## 2021-05-22 DIAGNOSIS — R57.9 SHOCK: ICD-10-CM

## 2021-05-22 DIAGNOSIS — T82.7XXA ICD (IMPLANTABLE CARDIOVERTER-DEFIBRILLATOR) INFECTION, INITIAL ENCOUNTER: ICD-10-CM

## 2021-05-22 DIAGNOSIS — N17.9 ACUTE RENAL FAILURE, UNSPECIFIED ACUTE RENAL FAILURE TYPE: ICD-10-CM

## 2021-05-22 DIAGNOSIS — I50.22 CHRONIC SYSTOLIC HEART FAILURE: ICD-10-CM

## 2021-05-22 DIAGNOSIS — Z98.890 S/P TVR (TRICUSPID VALVE REPAIR): ICD-10-CM

## 2021-05-22 DIAGNOSIS — R73.9 HYPERGLYCEMIA: ICD-10-CM

## 2021-05-22 DIAGNOSIS — R09.89 ENDOCARDITIS, SUSPECTED: ICD-10-CM

## 2021-05-22 DIAGNOSIS — R78.81 STAPHYLOCOCCUS EPIDERMIDIS BACTEREMIA: ICD-10-CM

## 2021-05-22 DIAGNOSIS — R78.81 BACTEREMIA: ICD-10-CM

## 2021-05-22 DIAGNOSIS — I50.9 ACUTE DECOMPENSATED HEART FAILURE: ICD-10-CM

## 2021-05-22 DIAGNOSIS — R07.9 CHEST PAIN: ICD-10-CM

## 2021-05-22 DIAGNOSIS — E11.9 TYPE 2 DIABETES MELLITUS WITHOUT COMPLICATION, WITHOUT LONG-TERM CURRENT USE OF INSULIN: ICD-10-CM

## 2021-05-22 DIAGNOSIS — D64.9 ANEMIA, UNSPECIFIED TYPE: ICD-10-CM

## 2021-05-22 LAB
ALBUMIN SERPL BCP-MCNC: 2.5 G/DL (ref 3.5–5.2)
ALP SERPL-CCNC: 119 U/L (ref 55–135)
ALT SERPL W/O P-5'-P-CCNC: 17 U/L (ref 10–44)
ANION GAP SERPL CALC-SCNC: 19 MMOL/L (ref 8–16)
AST SERPL-CCNC: 17 U/L (ref 10–40)
BASOPHILS # BLD AUTO: 0.03 K/UL (ref 0–0.2)
BASOPHILS NFR BLD: 0.2 % (ref 0–1.9)
BILIRUB SERPL-MCNC: 0.6 MG/DL (ref 0.1–1)
BNP SERPL-MCNC: 158 PG/ML (ref 0–99)
BUN SERPL-MCNC: 190 MG/DL (ref 8–23)
CALCIUM SERPL-MCNC: 9.1 MG/DL (ref 8.7–10.5)
CHLORIDE SERPL-SCNC: 91 MMOL/L (ref 95–110)
CO2 SERPL-SCNC: 14 MMOL/L (ref 23–29)
CREAT SERPL-MCNC: 7.7 MG/DL (ref 0.5–1.4)
CTP QC/QA: YES
DIFFERENTIAL METHOD: ABNORMAL
EOSINOPHIL # BLD AUTO: 0 K/UL (ref 0–0.5)
EOSINOPHIL NFR BLD: 0 % (ref 0–8)
ERYTHROCYTE [DISTWIDTH] IN BLOOD BY AUTOMATED COUNT: 15.7 % (ref 11.5–14.5)
EST. GFR  (AFRICAN AMERICAN): 7.3 ML/MIN/1.73 M^2
EST. GFR  (NON AFRICAN AMERICAN): 6.3 ML/MIN/1.73 M^2
GLUCOSE SERPL-MCNC: 140 MG/DL (ref 70–110)
HCT VFR BLD AUTO: 23.3 % (ref 40–54)
HGB BLD-MCNC: 7.3 G/DL (ref 14–18)
IMM GRANULOCYTES # BLD AUTO: 0.24 K/UL (ref 0–0.04)
IMM GRANULOCYTES NFR BLD AUTO: 1.9 % (ref 0–0.5)
LYMPHOCYTES # BLD AUTO: 0.6 K/UL (ref 1–4.8)
LYMPHOCYTES NFR BLD: 4.7 % (ref 18–48)
MAGNESIUM SERPL-MCNC: 2.3 MG/DL (ref 1.6–2.6)
MCH RBC QN AUTO: 27.4 PG (ref 27–31)
MCHC RBC AUTO-ENTMCNC: 31.3 G/DL (ref 32–36)
MCV RBC AUTO: 88 FL (ref 82–98)
MONOCYTES # BLD AUTO: 0.8 K/UL (ref 0.3–1)
MONOCYTES NFR BLD: 6.3 % (ref 4–15)
NEUTROPHILS # BLD AUTO: 11.2 K/UL (ref 1.8–7.7)
NEUTROPHILS NFR BLD: 86.9 % (ref 38–73)
NRBC BLD-RTO: 0 /100 WBC
PLATELET # BLD AUTO: 251 K/UL (ref 150–450)
PMV BLD AUTO: 10.2 FL (ref 9.2–12.9)
POCT GLUCOSE: 144 MG/DL (ref 70–110)
POCT GLUCOSE: 254 MG/DL (ref 70–110)
POTASSIUM SERPL-SCNC: 6.7 MMOL/L (ref 3.5–5.1)
PROT SERPL-MCNC: 6.3 G/DL (ref 6–8.4)
RBC # BLD AUTO: 2.66 M/UL (ref 4.6–6.2)
SARS-COV-2 RDRP RESP QL NAA+PROBE: NEGATIVE
SODIUM SERPL-SCNC: 124 MMOL/L (ref 136–145)
TROPONIN I SERPL DL<=0.01 NG/ML-MCNC: 0.06 NG/ML (ref 0–0.03)
WBC # BLD AUTO: 12.89 K/UL (ref 3.9–12.7)

## 2021-05-22 PROCEDURE — 93005 ELECTROCARDIOGRAM TRACING: CPT

## 2021-05-22 PROCEDURE — 85025 COMPLETE CBC W/AUTO DIFF WBC: CPT | Performed by: STUDENT IN AN ORGANIZED HEALTH CARE EDUCATION/TRAINING PROGRAM

## 2021-05-22 PROCEDURE — 25000003 PHARM REV CODE 250: Performed by: STUDENT IN AN ORGANIZED HEALTH CARE EDUCATION/TRAINING PROGRAM

## 2021-05-22 PROCEDURE — 82962 GLUCOSE BLOOD TEST: CPT

## 2021-05-22 PROCEDURE — 99291 CRITICAL CARE FIRST HOUR: CPT

## 2021-05-22 PROCEDURE — 93010 ELECTROCARDIOGRAM REPORT: CPT | Mod: ,,, | Performed by: INTERNAL MEDICINE

## 2021-05-22 PROCEDURE — 36556 PR INSERT NON-TUNNEL CV CATH 5+ YRS OLD: ICD-10-PCS | Mod: 59,,, | Performed by: EMERGENCY MEDICINE

## 2021-05-22 PROCEDURE — 93010 EKG 12-LEAD: ICD-10-PCS | Mod: 76,,, | Performed by: INTERNAL MEDICINE

## 2021-05-22 PROCEDURE — 83880 ASSAY OF NATRIURETIC PEPTIDE: CPT | Performed by: STUDENT IN AN ORGANIZED HEALTH CARE EDUCATION/TRAINING PROGRAM

## 2021-05-22 PROCEDURE — 63600175 PHARM REV CODE 636 W HCPCS: Performed by: EMERGENCY MEDICINE

## 2021-05-22 PROCEDURE — 36410 VNPNXR 3YR/> PHY/QHP DX/THER: CPT | Mod: 59,,, | Performed by: EMERGENCY MEDICINE

## 2021-05-22 PROCEDURE — 80053 COMPREHEN METABOLIC PANEL: CPT | Performed by: EMERGENCY MEDICINE

## 2021-05-22 PROCEDURE — 83735 ASSAY OF MAGNESIUM: CPT | Performed by: EMERGENCY MEDICINE

## 2021-05-22 PROCEDURE — 96365 THER/PROPH/DIAG IV INF INIT: CPT

## 2021-05-22 PROCEDURE — 36556 INSERT NON-TUNNEL CV CATH: CPT | Mod: 59,,, | Performed by: EMERGENCY MEDICINE

## 2021-05-22 PROCEDURE — 96375 TX/PRO/DX INJ NEW DRUG ADDON: CPT

## 2021-05-22 PROCEDURE — 25000242 PHARM REV CODE 250 ALT 637 W/ HCPCS: Performed by: EMERGENCY MEDICINE

## 2021-05-22 PROCEDURE — 99291 PR CRITICAL CARE, E/M 30-74 MINUTES: ICD-10-PCS | Mod: 25,,, | Performed by: EMERGENCY MEDICINE

## 2021-05-22 PROCEDURE — 80047 BASIC METABLC PNL IONIZED CA: CPT

## 2021-05-22 PROCEDURE — 93010 ELECTROCARDIOGRAM REPORT: CPT | Mod: 76,,, | Performed by: INTERNAL MEDICINE

## 2021-05-22 PROCEDURE — 36410 PR VENIPUNC NEED PHYS SKILL,DX OR RX: ICD-10-PCS | Mod: 59,,, | Performed by: EMERGENCY MEDICINE

## 2021-05-22 PROCEDURE — 25000003 PHARM REV CODE 250: Performed by: EMERGENCY MEDICINE

## 2021-05-22 PROCEDURE — 99291 CRITICAL CARE FIRST HOUR: CPT | Mod: 25,,, | Performed by: EMERGENCY MEDICINE

## 2021-05-22 PROCEDURE — 96366 THER/PROPH/DIAG IV INF ADDON: CPT

## 2021-05-22 PROCEDURE — 84484 ASSAY OF TROPONIN QUANT: CPT | Performed by: STUDENT IN AN ORGANIZED HEALTH CARE EDUCATION/TRAINING PROGRAM

## 2021-05-22 PROCEDURE — U0002 COVID-19 LAB TEST NON-CDC: HCPCS | Performed by: EMERGENCY MEDICINE

## 2021-05-22 PROCEDURE — 94640 AIRWAY INHALATION TREATMENT: CPT

## 2021-05-22 RX ORDER — ALBUTEROL SULFATE 2.5 MG/.5ML
10 SOLUTION RESPIRATORY (INHALATION)
Status: COMPLETED | OUTPATIENT
Start: 2021-05-22 | End: 2021-05-22

## 2021-05-22 RX ORDER — ASPIRIN 325 MG
325 TABLET ORAL
Status: COMPLETED | OUTPATIENT
Start: 2021-05-22 | End: 2021-05-22

## 2021-05-22 RX ADMIN — DEXTROSE MONOHYDRATE 25 G: 25 INJECTION, SOLUTION INTRAVENOUS at 11:05

## 2021-05-22 RX ADMIN — ASPIRIN 325 MG ORAL TABLET 325 MG: 325 PILL ORAL at 09:05

## 2021-05-22 RX ADMIN — INSULIN HUMAN 5 UNITS: 100 INJECTION, SOLUTION PARENTERAL at 11:05

## 2021-05-22 RX ADMIN — ALBUTEROL SULFATE 10 MG: 2.5 SOLUTION RESPIRATORY (INHALATION) at 11:05

## 2021-05-22 RX ADMIN — CALCIUM GLUCONATE 1 G: 98 INJECTION, SOLUTION INTRAVENOUS at 11:05

## 2021-05-23 PROBLEM — N17.9 ACUTE RENAL FAILURE SUPERIMPOSED ON STAGE 3 CHRONIC KIDNEY DISEASE: Status: ACTIVE | Noted: 2021-05-23

## 2021-05-23 PROBLEM — D68.9 COAGULOPATHY: Status: ACTIVE | Noted: 2021-05-23

## 2021-05-23 PROBLEM — N18.30 ACUTE RENAL FAILURE SUPERIMPOSED ON STAGE 3 CHRONIC KIDNEY DISEASE: Status: ACTIVE | Noted: 2021-05-23

## 2021-05-23 PROBLEM — I50.23 ACUTE ON CHRONIC SYSTOLIC CONGESTIVE HEART FAILURE: Status: ACTIVE | Noted: 2021-05-23

## 2021-05-23 PROBLEM — R57.0 CARDIOGENIC SHOCK: Status: ACTIVE | Noted: 2021-05-23

## 2021-05-23 PROBLEM — E87.5 HYPERKALEMIA: Status: ACTIVE | Noted: 2021-05-23

## 2021-05-23 PROBLEM — N17.9 ACUTE RENAL FAILURE: Status: ACTIVE | Noted: 2021-05-23

## 2021-05-23 PROBLEM — R57.9 SHOCK: Status: ACTIVE | Noted: 2021-05-23

## 2021-05-23 PROBLEM — I60.9 SUBARACHNOID HEMORRHAGE: Status: ACTIVE | Noted: 2021-05-23

## 2021-05-23 PROBLEM — I95.9 HYPOTENSION: Status: ACTIVE | Noted: 2021-05-23

## 2021-05-23 LAB
ABO + RH BLD: NORMAL
ALBUMIN SERPL BCP-MCNC: 2.3 G/DL (ref 3.5–5.2)
ALLENS TEST: ABNORMAL
ANION GAP SERPL CALC-SCNC: 14 MMOL/L (ref 8–16)
ANION GAP SERPL CALC-SCNC: 14 MMOL/L (ref 8–16)
ANION GAP SERPL CALC-SCNC: 17 MMOL/L (ref 8–16)
APTT BLDCRRT: 52.9 SEC (ref 21–32)
APTT BLDCRRT: 55.3 SEC (ref 21–32)
APTT BLDCRRT: >150 SEC (ref 21–32)
ASCENDING AORTA: 3.12 CM
AV INDEX (PROSTH): 0.39
AV MEAN GRADIENT: 17 MMHG
AV PEAK GRADIENT: 25 MMHG
AV VALVE AREA: 1.59 CM2
AV VELOCITY RATIO: 0.53
BACTERIA #/AREA URNS AUTO: ABNORMAL /HPF
BASOPHILS # BLD AUTO: 0.02 K/UL (ref 0–0.2)
BASOPHILS # BLD AUTO: 0.02 K/UL (ref 0–0.2)
BASOPHILS NFR BLD: 0.1 % (ref 0–1.9)
BASOPHILS NFR BLD: 0.2 % (ref 0–1.9)
BILIRUB UR QL STRIP: NEGATIVE
BLD GP AB SCN CELLS X3 SERPL QL: NORMAL
BLD PROD TYP BPU: NORMAL
BLOOD UNIT EXPIRATION DATE: NORMAL
BLOOD UNIT TYPE CODE: 5100
BLOOD UNIT TYPE: NORMAL
BSA FOR ECHO PROCEDURE: 1.94 M2
BUN SERPL-MCNC: 173 MG/DL (ref 8–23)
BUN SERPL-MCNC: 181 MG/DL (ref 8–23)
BUN SERPL-MCNC: 190 MG/DL (ref 8–23)
CALCIUM SERPL-MCNC: 8.8 MG/DL (ref 8.7–10.5)
CALCIUM SERPL-MCNC: 9 MG/DL (ref 8.7–10.5)
CALCIUM SERPL-MCNC: 9.1 MG/DL (ref 8.7–10.5)
CHLORIDE SERPL-SCNC: 91 MMOL/L (ref 95–110)
CHLORIDE SERPL-SCNC: 92 MMOL/L (ref 95–110)
CHLORIDE SERPL-SCNC: 93 MMOL/L (ref 95–110)
CLARITY UR REFRACT.AUTO: ABNORMAL
CO2 SERPL-SCNC: 17 MMOL/L (ref 23–29)
CO2 SERPL-SCNC: 18 MMOL/L (ref 23–29)
CO2 SERPL-SCNC: 19 MMOL/L (ref 23–29)
CODING SYSTEM: NORMAL
COLOR UR AUTO: YELLOW
CREAT SERPL-MCNC: 5.2 MG/DL (ref 0.5–1.4)
CREAT SERPL-MCNC: 5.8 MG/DL (ref 0.5–1.4)
CREAT SERPL-MCNC: 6.9 MG/DL (ref 0.5–1.4)
CREAT UR-MCNC: 84 MG/DL (ref 23–375)
CV ECHO LV RWT: 0.36 CM
D DIMER PPP IA.FEU-MCNC: 7.35 MG/L FEU
DELSYS: ABNORMAL
DIFFERENTIAL METHOD: ABNORMAL
DIFFERENTIAL METHOD: ABNORMAL
DISPENSE STATUS: NORMAL
DOP CALC AO PEAK VEL: 2.49 M/S
DOP CALC AO VTI: 38.39 CM
DOP CALC LVOT AREA: 4.1 CM2
DOP CALC LVOT DIAMETER: 2.28 CM
DOP CALC LVOT PEAK VEL: 1.32 M/S
DOP CALC LVOT STROKE VOLUME: 60.97 CM3
DOP CALCLVOT PEAK VEL VTI: 14.94 CM
E/E' RATIO: 16.24 M/S
ECHO LV POSTERIOR WALL: 0.84 CM (ref 0.6–1.1)
EJECTION FRACTION: 30 %
EOSINOPHIL # BLD AUTO: 0 K/UL (ref 0–0.5)
EOSINOPHIL # BLD AUTO: 0 K/UL (ref 0–0.5)
EOSINOPHIL NFR BLD: 0.1 % (ref 0–8)
EOSINOPHIL NFR BLD: 0.1 % (ref 0–8)
ERYTHROCYTE [DISTWIDTH] IN BLOOD BY AUTOMATED COUNT: 14.8 % (ref 11.5–14.5)
ERYTHROCYTE [DISTWIDTH] IN BLOOD BY AUTOMATED COUNT: 17 % (ref 11.5–14.5)
EST. GFR  (AFRICAN AMERICAN): 10.3 ML/MIN/1.73 M^2
EST. GFR  (AFRICAN AMERICAN): 11.7 ML/MIN/1.73 M^2
EST. GFR  (AFRICAN AMERICAN): 8.3 ML/MIN/1.73 M^2
EST. GFR  (NON AFRICAN AMERICAN): 10.1 ML/MIN/1.73 M^2
EST. GFR  (NON AFRICAN AMERICAN): 7.2 ML/MIN/1.73 M^2
EST. GFR  (NON AFRICAN AMERICAN): 8.9 ML/MIN/1.73 M^2
FIBRINOGEN PPP-MCNC: 514 MG/DL (ref 182–400)
FRACTIONAL SHORTENING: 34 % (ref 28–44)
GLUCOSE SERPL-MCNC: 116 MG/DL (ref 70–110)
GLUCOSE SERPL-MCNC: 183 MG/DL (ref 70–110)
GLUCOSE SERPL-MCNC: 228 MG/DL (ref 70–110)
GLUCOSE UR QL STRIP: NEGATIVE
HAPTOGLOB SERPL-MCNC: 66 MG/DL (ref 30–250)
HCO3 UR-SCNC: 18.4 MMOL/L (ref 24–28)
HCO3 UR-SCNC: 19.5 MMOL/L (ref 24–28)
HCO3 UR-SCNC: 19.5 MMOL/L (ref 24–28)
HCT VFR BLD AUTO: 15.1 % (ref 40–54)
HCT VFR BLD AUTO: 24.3 % (ref 40–54)
HGB BLD-MCNC: 5.2 G/DL (ref 14–18)
HGB BLD-MCNC: 8.4 G/DL (ref 14–18)
HGB UR QL STRIP: ABNORMAL
HYALINE CASTS UR QL AUTO: 3 /LPF
IMM GRANULOCYTES # BLD AUTO: 0.2 K/UL (ref 0–0.04)
IMM GRANULOCYTES # BLD AUTO: 0.26 K/UL (ref 0–0.04)
IMM GRANULOCYTES NFR BLD AUTO: 1.4 % (ref 0–0.5)
IMM GRANULOCYTES NFR BLD AUTO: 2 % (ref 0–0.5)
INR PPP: 1.5 (ref 0.8–1.2)
INR PPP: 1.6 (ref 0.8–1.2)
INR PPP: >10 (ref 0.8–1.2)
INR PPP: >10 (ref 0.8–1.2)
INTERVENTRICULAR SEPTUM: 0.84 CM (ref 0.6–1.1)
IVRT: 58.99 MSEC
KETONES UR QL STRIP: NEGATIVE
LA MAJOR: 6.06 CM
LA MINOR: 6.14 CM
LA WIDTH: 4.81 CM
LACTATE SERPL-SCNC: 0.9 MMOL/L (ref 0.5–2.2)
LACTATE SERPL-SCNC: 1.9 MMOL/L (ref 0.5–2.2)
LDH SERPL L TO P-CCNC: 272 U/L (ref 110–260)
LEFT ATRIUM SIZE: 4.12 CM
LEFT ATRIUM VOLUME INDEX MOD: 51 ML/M2
LEFT ATRIUM VOLUME INDEX: 53.2 ML/M2
LEFT ATRIUM VOLUME MOD: 98.35 CM3
LEFT ATRIUM VOLUME: 102.75 CM3
LEFT INTERNAL DIMENSION IN SYSTOLE: 3.08 CM (ref 2.1–4)
LEFT VENTRICLE DIASTOLIC VOLUME INDEX: 52.06 ML/M2
LEFT VENTRICLE DIASTOLIC VOLUME: 100.47 ML
LEFT VENTRICLE MASS INDEX: 67 G/M2
LEFT VENTRICLE SYSTOLIC VOLUME INDEX: 19.3 ML/M2
LEFT VENTRICLE SYSTOLIC VOLUME: 37.23 ML
LEFT VENTRICULAR INTERNAL DIMENSION IN DIASTOLE: 4.66 CM (ref 3.5–6)
LEFT VENTRICULAR MASS: 128.43 G
LEUKOCYTE ESTERASE UR QL STRIP: NEGATIVE
LV LATERAL E/E' RATIO: 23 M/S
LV SEPTAL E/E' RATIO: 12.55 M/S
LYMPHOCYTES # BLD AUTO: 1.3 K/UL (ref 1–4.8)
LYMPHOCYTES # BLD AUTO: 1.3 K/UL (ref 1–4.8)
LYMPHOCYTES NFR BLD: 9.2 % (ref 18–48)
LYMPHOCYTES NFR BLD: 9.9 % (ref 18–48)
MAGNESIUM SERPL-MCNC: 2.1 MG/DL (ref 1.6–2.6)
MCH RBC QN AUTO: 27.4 PG (ref 27–31)
MCH RBC QN AUTO: 28 PG (ref 27–31)
MCHC RBC AUTO-ENTMCNC: 34.4 G/DL (ref 32–36)
MCHC RBC AUTO-ENTMCNC: 34.6 G/DL (ref 32–36)
MCV RBC AUTO: 80 FL (ref 82–98)
MCV RBC AUTO: 81 FL (ref 82–98)
MICROSCOPIC COMMENT: ABNORMAL
MONOCYTES # BLD AUTO: 1 K/UL (ref 0.3–1)
MONOCYTES # BLD AUTO: 1 K/UL (ref 0.3–1)
MONOCYTES NFR BLD: 6.9 % (ref 4–15)
MONOCYTES NFR BLD: 7.7 % (ref 4–15)
MV PEAK E VEL: 1.38 M/S
NEUTROPHILS # BLD AUTO: 10.4 K/UL (ref 1.8–7.7)
NEUTROPHILS # BLD AUTO: 11.9 K/UL (ref 1.8–7.7)
NEUTROPHILS NFR BLD: 80.1 % (ref 38–73)
NEUTROPHILS NFR BLD: 82.3 % (ref 38–73)
NITRITE UR QL STRIP: NEGATIVE
NRBC BLD-RTO: 0 /100 WBC
NRBC BLD-RTO: 0 /100 WBC
NUM UNITS TRANS FFP: NORMAL
NUM UNITS TRANS FFP: NORMAL
NUM UNITS TRANS PACKED RBC: NORMAL
PCO2 BLDA: 31.5 MMHG (ref 35–45)
PCO2 BLDA: 32.1 MMHG (ref 35–45)
PCO2 BLDA: 33 MMHG (ref 35–45)
PH SMN: 7.37 [PH] (ref 7.35–7.45)
PH SMN: 7.38 [PH] (ref 7.35–7.45)
PH SMN: 7.4 [PH] (ref 7.35–7.45)
PH UR STRIP: 5 [PH] (ref 5–8)
PHOSPHATE SERPL-MCNC: 4.8 MG/DL (ref 2.7–4.5)
PISA TR MAX VEL: 3.35 M/S
PLATELET # BLD AUTO: 179 K/UL (ref 150–450)
PLATELET # BLD AUTO: 223 K/UL (ref 150–450)
PLATELET BLD QL SMEAR: ABNORMAL
PMV BLD AUTO: 10.2 FL (ref 9.2–12.9)
PMV BLD AUTO: 9.9 FL (ref 9.2–12.9)
PO2 BLDA: 27 MMHG (ref 40–60)
PO2 BLDA: 31 MMHG (ref 40–60)
PO2 BLDA: 31 MMHG (ref 40–60)
POC BE: -5 MMOL/L
POC BE: -6 MMOL/L
POC BE: -7 MMOL/L
POC SATURATED O2: 48 % (ref 95–100)
POC SATURATED O2: 58 % (ref 95–100)
POC SATURATED O2: 60 % (ref 95–100)
POC TCO2: 19 MMOL/L (ref 24–29)
POC TCO2: 20 MMOL/L (ref 24–29)
POC TCO2: 21 MMOL/L (ref 24–29)
POCT GLUCOSE: 127 MG/DL (ref 70–110)
POCT GLUCOSE: 232 MG/DL (ref 70–110)
POTASSIUM SERPL-SCNC: 5.4 MMOL/L (ref 3.5–5.1)
POTASSIUM SERPL-SCNC: 5.5 MMOL/L (ref 3.5–5.1)
POTASSIUM SERPL-SCNC: 5.7 MMOL/L (ref 3.5–5.1)
PROT UR QL STRIP: NEGATIVE
PROTHROMBIN TIME: 16.5 SEC (ref 9–12.5)
PROTHROMBIN TIME: 16.7 SEC (ref 9–12.5)
PROTHROMBIN TIME: >100 SEC (ref 9–12.5)
PROTHROMBIN TIME: >100 SEC (ref 9–12.5)
RA MAJOR: 4.84 CM
RA PRESSURE: 3 MMHG
RA WIDTH: 3.99 CM
RBC # BLD AUTO: 1.9 M/UL (ref 4.6–6.2)
RBC # BLD AUTO: 3 M/UL (ref 4.6–6.2)
RBC #/AREA URNS AUTO: 1 /HPF (ref 0–4)
RIGHT VENTRICULAR END-DIASTOLIC DIMENSION: 3.62 CM
RV TISSUE DOPPLER FREE WALL SYSTOLIC VELOCITY 1 (APICAL 4 CHAMBER VIEW): 12.03 CM/S
SAMPLE: ABNORMAL
SINUS: 3.25 CM
SITE: ABNORMAL
SODIUM SERPL-SCNC: 124 MMOL/L (ref 136–145)
SODIUM SERPL-SCNC: 125 MMOL/L (ref 136–145)
SODIUM SERPL-SCNC: 126 MMOL/L (ref 136–145)
SODIUM UR-SCNC: 45 MMOL/L (ref 20–250)
SP GR UR STRIP: 1.01 (ref 1–1.03)
SQUAMOUS #/AREA URNS AUTO: 1 /HPF
STJ: 2.85 CM
TDI LATERAL: 0.06 M/S
TDI SEPTAL: 0.11 M/S
TDI: 0.09 M/S
TR MAX PG: 45 MMHG
TRANS ERYTHROCYTES VOL PATIENT: NORMAL ML
TRANS ERYTHROCYTES VOL PATIENT: NORMAL ML
TRICUSPID ANNULAR PLANE SYSTOLIC EXCURSION: 0.86 CM
TROPONIN I SERPL DL<=0.01 NG/ML-MCNC: 0.06 NG/ML (ref 0–0.03)
TV REST PULMONARY ARTERY PRESSURE: 48 MMHG
URN SPEC COLLECT METH UR: ABNORMAL
WBC # BLD AUTO: 13.02 K/UL (ref 3.9–12.7)
WBC # BLD AUTO: 14.4 K/UL (ref 3.9–12.7)
WBC #/AREA URNS AUTO: 2 /HPF (ref 0–5)

## 2021-05-23 PROCEDURE — 63600175 PHARM REV CODE 636 W HCPCS: Performed by: STUDENT IN AN ORGANIZED HEALTH CARE EDUCATION/TRAINING PROGRAM

## 2021-05-23 PROCEDURE — 25000003 PHARM REV CODE 250: Performed by: INTERNAL MEDICINE

## 2021-05-23 PROCEDURE — 82962 GLUCOSE BLOOD TEST: CPT

## 2021-05-23 PROCEDURE — 99223 1ST HOSP IP/OBS HIGH 75: CPT | Mod: ,,, | Performed by: INTERNAL MEDICINE

## 2021-05-23 PROCEDURE — A4216 STERILE WATER/SALINE, 10 ML: HCPCS | Performed by: STUDENT IN AN ORGANIZED HEALTH CARE EDUCATION/TRAINING PROGRAM

## 2021-05-23 PROCEDURE — 25000003 PHARM REV CODE 250: Performed by: STUDENT IN AN ORGANIZED HEALTH CARE EDUCATION/TRAINING PROGRAM

## 2021-05-23 PROCEDURE — 85610 PROTHROMBIN TIME: CPT | Mod: 91 | Performed by: STUDENT IN AN ORGANIZED HEALTH CARE EDUCATION/TRAINING PROGRAM

## 2021-05-23 PROCEDURE — 27000221 HC OXYGEN, UP TO 24 HOURS

## 2021-05-23 PROCEDURE — 85384 FIBRINOGEN ACTIVITY: CPT | Performed by: EMERGENCY MEDICINE

## 2021-05-23 PROCEDURE — 99291 CRITICAL CARE FIRST HOUR: CPT | Mod: 25,,, | Performed by: INTERNAL MEDICINE

## 2021-05-23 PROCEDURE — 85379 FIBRIN DEGRADATION QUANT: CPT | Performed by: EMERGENCY MEDICINE

## 2021-05-23 PROCEDURE — 87040 BLOOD CULTURE FOR BACTERIA: CPT | Mod: 59 | Performed by: STUDENT IN AN ORGANIZED HEALTH CARE EDUCATION/TRAINING PROGRAM

## 2021-05-23 PROCEDURE — 80069 RENAL FUNCTION PANEL: CPT | Performed by: HOSPITALIST

## 2021-05-23 PROCEDURE — 87077 CULTURE AEROBIC IDENTIFY: CPT | Mod: 59 | Performed by: STUDENT IN AN ORGANIZED HEALTH CARE EDUCATION/TRAINING PROGRAM

## 2021-05-23 PROCEDURE — 85025 COMPLETE CBC W/AUTO DIFF WBC: CPT | Mod: 91 | Performed by: INTERNAL MEDICINE

## 2021-05-23 PROCEDURE — P9016 RBC LEUKOCYTES REDUCED: HCPCS | Performed by: STUDENT IN AN ORGANIZED HEALTH CARE EDUCATION/TRAINING PROGRAM

## 2021-05-23 PROCEDURE — 80048 BASIC METABOLIC PNL TOTAL CA: CPT | Mod: 91 | Performed by: STUDENT IN AN ORGANIZED HEALTH CARE EDUCATION/TRAINING PROGRAM

## 2021-05-23 PROCEDURE — C9113 INJ PANTOPRAZOLE SODIUM, VIA: HCPCS | Performed by: STUDENT IN AN ORGANIZED HEALTH CARE EDUCATION/TRAINING PROGRAM

## 2021-05-23 PROCEDURE — 94761 N-INVAS EAR/PLS OXIMETRY MLT: CPT

## 2021-05-23 PROCEDURE — 85610 PROTHROMBIN TIME: CPT | Performed by: EMERGENCY MEDICINE

## 2021-05-23 PROCEDURE — 93005 ELECTROCARDIOGRAM TRACING: CPT

## 2021-05-23 PROCEDURE — 86920 COMPATIBILITY TEST SPIN: CPT | Performed by: STUDENT IN AN ORGANIZED HEALTH CARE EDUCATION/TRAINING PROGRAM

## 2021-05-23 PROCEDURE — 83615 LACTATE (LD) (LDH) ENZYME: CPT | Performed by: STUDENT IN AN ORGANIZED HEALTH CARE EDUCATION/TRAINING PROGRAM

## 2021-05-23 PROCEDURE — 25000003 PHARM REV CODE 250

## 2021-05-23 PROCEDURE — 85025 COMPLETE CBC W/AUTO DIFF WBC: CPT | Performed by: STUDENT IN AN ORGANIZED HEALTH CARE EDUCATION/TRAINING PROGRAM

## 2021-05-23 PROCEDURE — 83605 ASSAY OF LACTIC ACID: CPT | Performed by: STUDENT IN AN ORGANIZED HEALTH CARE EDUCATION/TRAINING PROGRAM

## 2021-05-23 PROCEDURE — C9132 KCENTRA, PER I.U.: HCPCS | Mod: JG | Performed by: STUDENT IN AN ORGANIZED HEALTH CARE EDUCATION/TRAINING PROGRAM

## 2021-05-23 PROCEDURE — 93010 ELECTROCARDIOGRAM REPORT: CPT | Mod: ,,, | Performed by: INTERNAL MEDICINE

## 2021-05-23 PROCEDURE — 20000000 HC ICU ROOM

## 2021-05-23 PROCEDURE — 93010 EKG 12-LEAD: ICD-10-PCS | Mod: ,,, | Performed by: INTERNAL MEDICINE

## 2021-05-23 PROCEDURE — 63600175 PHARM REV CODE 636 W HCPCS: Mod: JG | Performed by: STUDENT IN AN ORGANIZED HEALTH CARE EDUCATION/TRAINING PROGRAM

## 2021-05-23 PROCEDURE — 99223 PR INITIAL HOSPITAL CARE,LEVL III: ICD-10-PCS | Mod: ,,, | Performed by: INTERNAL MEDICINE

## 2021-05-23 PROCEDURE — 87186 SC STD MICRODIL/AGAR DIL: CPT | Performed by: STUDENT IN AN ORGANIZED HEALTH CARE EDUCATION/TRAINING PROGRAM

## 2021-05-23 PROCEDURE — 99291 PR CRITICAL CARE, E/M 30-74 MINUTES: ICD-10-PCS | Mod: 25,,, | Performed by: INTERNAL MEDICINE

## 2021-05-23 PROCEDURE — 99900035 HC TECH TIME PER 15 MIN (STAT)

## 2021-05-23 PROCEDURE — 80048 BASIC METABOLIC PNL TOTAL CA: CPT | Performed by: STUDENT IN AN ORGANIZED HEALTH CARE EDUCATION/TRAINING PROGRAM

## 2021-05-23 PROCEDURE — 99233 SBSQ HOSP IP/OBS HIGH 50: CPT | Mod: ,,, | Performed by: NEUROLOGICAL SURGERY

## 2021-05-23 PROCEDURE — P9021 RED BLOOD CELLS UNIT: HCPCS | Performed by: STUDENT IN AN ORGANIZED HEALTH CARE EDUCATION/TRAINING PROGRAM

## 2021-05-23 PROCEDURE — 83010 ASSAY OF HAPTOGLOBIN QUANT: CPT | Performed by: STUDENT IN AN ORGANIZED HEALTH CARE EDUCATION/TRAINING PROGRAM

## 2021-05-23 PROCEDURE — 93010 ELECTROCARDIOGRAM REPORT: CPT | Mod: 76,,, | Performed by: INTERNAL MEDICINE

## 2021-05-23 PROCEDURE — 83605 ASSAY OF LACTIC ACID: CPT | Mod: 91 | Performed by: STUDENT IN AN ORGANIZED HEALTH CARE EDUCATION/TRAINING PROGRAM

## 2021-05-23 PROCEDURE — 36430 TRANSFUSION BLD/BLD COMPNT: CPT

## 2021-05-23 PROCEDURE — 36620 INSERTION CATHETER ARTERY: CPT

## 2021-05-23 PROCEDURE — 84484 ASSAY OF TROPONIN QUANT: CPT | Performed by: STUDENT IN AN ORGANIZED HEALTH CARE EDUCATION/TRAINING PROGRAM

## 2021-05-23 PROCEDURE — 99233 PR SUBSEQUENT HOSPITAL CARE,LEVL III: ICD-10-PCS | Mod: ,,, | Performed by: NEUROLOGICAL SURGERY

## 2021-05-23 PROCEDURE — P9017 PLASMA 1 DONOR FRZ W/IN 8 HR: HCPCS | Performed by: STUDENT IN AN ORGANIZED HEALTH CARE EDUCATION/TRAINING PROGRAM

## 2021-05-23 PROCEDURE — 96366 THER/PROPH/DIAG IV INF ADDON: CPT | Mod: 59

## 2021-05-23 PROCEDURE — 82570 ASSAY OF URINE CREATININE: CPT | Performed by: HOSPITALIST

## 2021-05-23 PROCEDURE — 25000003 PHARM REV CODE 250: Performed by: EMERGENCY MEDICINE

## 2021-05-23 PROCEDURE — 85730 THROMBOPLASTIN TIME PARTIAL: CPT | Mod: 91 | Performed by: STUDENT IN AN ORGANIZED HEALTH CARE EDUCATION/TRAINING PROGRAM

## 2021-05-23 PROCEDURE — 96365 THER/PROPH/DIAG IV INF INIT: CPT | Mod: 59

## 2021-05-23 PROCEDURE — 96375 TX/PRO/DX INJ NEW DRUG ADDON: CPT | Mod: 59

## 2021-05-23 PROCEDURE — 83735 ASSAY OF MAGNESIUM: CPT | Performed by: STUDENT IN AN ORGANIZED HEALTH CARE EDUCATION/TRAINING PROGRAM

## 2021-05-23 PROCEDURE — 81001 URINALYSIS AUTO W/SCOPE: CPT | Performed by: EMERGENCY MEDICINE

## 2021-05-23 PROCEDURE — 84300 ASSAY OF URINE SODIUM: CPT | Performed by: HOSPITALIST

## 2021-05-23 PROCEDURE — 96361 HYDRATE IV INFUSION ADD-ON: CPT

## 2021-05-23 PROCEDURE — 86900 BLOOD TYPING SEROLOGIC ABO: CPT | Performed by: STUDENT IN AN ORGANIZED HEALTH CARE EDUCATION/TRAINING PROGRAM

## 2021-05-23 PROCEDURE — 96367 TX/PROPH/DG ADDL SEQ IV INF: CPT

## 2021-05-23 RX ORDER — NOREPINEPHRINE BITARTRATE/D5W 4MG/250ML
0-3 PLASTIC BAG, INJECTION (ML) INTRAVENOUS CONTINUOUS
Status: DISCONTINUED | OUTPATIENT
Start: 2021-05-23 | End: 2021-05-23

## 2021-05-23 RX ORDER — CEFEPIME HYDROCHLORIDE 1 G/1
1 INJECTION, POWDER, FOR SOLUTION INTRAMUSCULAR; INTRAVENOUS
Status: COMPLETED | OUTPATIENT
Start: 2021-05-23 | End: 2021-05-23

## 2021-05-23 RX ORDER — HYDROCODONE BITARTRATE AND ACETAMINOPHEN 500; 5 MG/1; MG/1
TABLET ORAL
Status: DISCONTINUED | OUTPATIENT
Start: 2021-05-23 | End: 2021-05-25

## 2021-05-23 RX ORDER — LIDOCAINE HYDROCHLORIDE 10 MG/ML
1 INJECTION INFILTRATION; PERINEURAL ONCE
Status: COMPLETED | OUTPATIENT
Start: 2021-05-23 | End: 2021-05-23

## 2021-05-23 RX ORDER — FUROSEMIDE 10 MG/ML
40 INJECTION INTRAMUSCULAR; INTRAVENOUS ONCE
Status: COMPLETED | OUTPATIENT
Start: 2021-05-23 | End: 2021-05-23

## 2021-05-23 RX ORDER — NOREPINEPHRINE BITARTRATE/D5W 4MG/250ML
0-3 PLASTIC BAG, INJECTION (ML) INTRAVENOUS CONTINUOUS
Status: DISCONTINUED | OUTPATIENT
Start: 2021-05-23 | End: 2021-05-24

## 2021-05-23 RX ORDER — DIPHENHYDRAMINE HCL 50 MG
50 CAPSULE ORAL ONCE AS NEEDED
Status: DISCONTINUED | OUTPATIENT
Start: 2021-05-23 | End: 2021-05-25

## 2021-05-23 RX ORDER — MUPIROCIN 20 MG/G
OINTMENT TOPICAL 2 TIMES DAILY
Status: COMPLETED | OUTPATIENT
Start: 2021-05-23 | End: 2021-05-28

## 2021-05-23 RX ORDER — FUROSEMIDE 10 MG/ML
40 INJECTION INTRAMUSCULAR; INTRAVENOUS
Status: DISCONTINUED | OUTPATIENT
Start: 2021-05-23 | End: 2021-05-23

## 2021-05-23 RX ORDER — LIDOCAINE HYDROCHLORIDE AND EPINEPHRINE 10; 10 MG/ML; UG/ML
1 INJECTION, SOLUTION INFILTRATION; PERINEURAL ONCE
Status: COMPLETED | OUTPATIENT
Start: 2021-05-23 | End: 2021-05-23

## 2021-05-23 RX ORDER — LIDOCAINE HYDROCHLORIDE AND EPINEPHRINE 10; 10 MG/ML; UG/ML
1 INJECTION, SOLUTION INFILTRATION; PERINEURAL ONCE
Status: DISCONTINUED | OUTPATIENT
Start: 2021-05-23 | End: 2021-05-23

## 2021-05-23 RX ORDER — PANTOPRAZOLE SODIUM 40 MG/10ML
40 INJECTION, POWDER, LYOPHILIZED, FOR SOLUTION INTRAVENOUS 2 TIMES DAILY
Status: DISCONTINUED | OUTPATIENT
Start: 2021-05-23 | End: 2021-05-28

## 2021-05-23 RX ORDER — LIDOCAINE HYDROCHLORIDE 10 MG/ML
INJECTION INFILTRATION; PERINEURAL
Status: COMPLETED
Start: 2021-05-23 | End: 2021-05-23

## 2021-05-23 RX ORDER — HYDROCODONE BITARTRATE AND ACETAMINOPHEN 5; 325 MG/1; MG/1
1 TABLET ORAL EVERY 6 HOURS PRN
Status: DISCONTINUED | OUTPATIENT
Start: 2021-05-23 | End: 2021-06-16 | Stop reason: HOSPADM

## 2021-05-23 RX ORDER — DOBUTAMINE HYDROCHLORIDE 400 MG/100ML
5 INJECTION INTRAVENOUS CONTINUOUS
Status: DISCONTINUED | OUTPATIENT
Start: 2021-05-23 | End: 2021-05-24

## 2021-05-23 RX ADMIN — DOBUTAMINE HYDROCHLORIDE 2 MCG/KG/MIN: 400 INJECTION INTRAVENOUS at 01:05

## 2021-05-23 RX ADMIN — HYDROCODONE BITARTRATE AND ACETAMINOPHEN 1 TABLET: 5; 325 TABLET ORAL at 08:05

## 2021-05-23 RX ADMIN — Medication 0.08 MCG/KG/MIN: at 04:05

## 2021-05-23 RX ADMIN — FUROSEMIDE 40 MG: 10 INJECTION, SOLUTION INTRAMUSCULAR; INTRAVENOUS at 05:05

## 2021-05-23 RX ADMIN — SODIUM CHLORIDE, SODIUM LACTATE, POTASSIUM CHLORIDE, AND CALCIUM CHLORIDE 250 ML: .6; .31; .03; .02 INJECTION, SOLUTION INTRAVENOUS at 12:05

## 2021-05-23 RX ADMIN — PANTOPRAZOLE SODIUM 40 MG: 40 INJECTION, POWDER, FOR SOLUTION INTRAVENOUS at 06:05

## 2021-05-23 RX ADMIN — PHYTONADIONE 5 MG: 10 INJECTION, EMULSION INTRAMUSCULAR; INTRAVENOUS; SUBCUTANEOUS at 07:05

## 2021-05-23 RX ADMIN — LIDOCAINE HYDROCHLORIDE 1 ML: 10 INJECTION, SOLUTION INFILTRATION; PERINEURAL at 09:05

## 2021-05-23 RX ADMIN — FUROSEMIDE 40 MG: 10 INJECTION, SOLUTION INTRAMUSCULAR; INTRAVENOUS at 11:05

## 2021-05-23 RX ADMIN — VASOPRESSIN 0.04 UNITS/MIN: 20 INJECTION INTRAVENOUS at 10:05

## 2021-05-23 RX ADMIN — LIDOCAINE HYDROCHLORIDE 1 ML: 10 INJECTION INFILTRATION; PERINEURAL at 09:05

## 2021-05-23 RX ADMIN — Medication 0.1 MCG/KG/MIN: at 04:05

## 2021-05-23 RX ADMIN — PANTOPRAZOLE SODIUM 40 MG: 40 INJECTION, POWDER, FOR SOLUTION INTRAVENOUS at 08:05

## 2021-05-23 RX ADMIN — SODIUM ZIRCONIUM CYCLOSILICATE 10 G: 10 POWDER, FOR SUSPENSION ORAL at 12:05

## 2021-05-23 RX ADMIN — Medication 0.24 MCG/KG/MIN: at 10:05

## 2021-05-23 RX ADMIN — LIDOCAINE HYDROCHLORIDE AND EPINEPHRINE 1 ML: 10; 10 INJECTION, SOLUTION INFILTRATION; PERINEURAL at 04:05

## 2021-05-23 RX ADMIN — Medication 0.14 MCG/KG/MIN: at 02:05

## 2021-05-23 RX ADMIN — CEFEPIME 1 G: 1 INJECTION, POWDER, FOR SOLUTION INTRAMUSCULAR; INTRAVENOUS at 04:05

## 2021-05-23 RX ADMIN — Medication 3864 UNITS: at 01:05

## 2021-05-23 RX ADMIN — VANCOMYCIN HYDROCHLORIDE 1750 MG: 10 INJECTION, POWDER, LYOPHILIZED, FOR SOLUTION INTRAVENOUS at 04:05

## 2021-05-23 RX ADMIN — MUPIROCIN: 20 OINTMENT TOPICAL at 08:05

## 2021-05-23 RX ADMIN — Medication 0.26 MCG/KG/MIN: at 09:05

## 2021-05-24 ENCOUNTER — TELEPHONE (OUTPATIENT)
Dept: NEUROSURGERY | Facility: CLINIC | Age: 76
End: 2021-05-24

## 2021-05-24 PROBLEM — J90 PLEURAL EFFUSION: Status: ACTIVE | Noted: 2021-05-24

## 2021-05-24 PROBLEM — R78.81 GRAM-POSITIVE COCCI BACTEREMIA: Status: ACTIVE | Noted: 2021-05-24

## 2021-05-24 LAB
ALBUMIN FLD-MCNC: 2 G/DL
ALBUMIN SERPL BCP-MCNC: 2.5 G/DL (ref 3.5–5.2)
ALBUMIN SERPL BCP-MCNC: 2.7 G/DL (ref 3.5–5.2)
ALLENS TEST: ABNORMAL
ALLENS TEST: ABNORMAL
ALLENS TEST: NORMAL
ALP SERPL-CCNC: 112 U/L (ref 55–135)
ALP SERPL-CCNC: 117 U/L (ref 55–135)
ALT SERPL W/O P-5'-P-CCNC: 16 U/L (ref 10–44)
ALT SERPL W/O P-5'-P-CCNC: 18 U/L (ref 10–44)
ANION GAP SERPL CALC-SCNC: 11 MMOL/L (ref 8–16)
ANION GAP SERPL CALC-SCNC: 12 MMOL/L (ref 8–16)
ANION GAP SERPL CALC-SCNC: 16 MMOL/L (ref 8–16)
APPEARANCE FLD: NORMAL
APTT BLDCRRT: 47.9 SEC (ref 21–32)
APTT BLDCRRT: 48.1 SEC (ref 21–32)
APTT BLDCRRT: 48.3 SEC (ref 21–32)
APTT BLDCRRT: 87.4 SEC (ref 21–32)
APTT BLDCRRT: 87.4 SEC (ref 21–32)
AST SERPL-CCNC: 15 U/L (ref 10–40)
AST SERPL-CCNC: 16 U/L (ref 10–40)
BASOPHILS # BLD AUTO: 0.02 K/UL (ref 0–0.2)
BASOPHILS # BLD AUTO: 0.03 K/UL (ref 0–0.2)
BASOPHILS # BLD AUTO: 0.03 K/UL (ref 0–0.2)
BASOPHILS # BLD AUTO: 0.04 K/UL (ref 0–0.2)
BASOPHILS # BLD AUTO: 0.04 K/UL (ref 0–0.2)
BASOPHILS NFR BLD: 0.1 % (ref 0–1.9)
BASOPHILS NFR BLD: 0.2 % (ref 0–1.9)
BASOPHILS NFR BLD: 0.3 % (ref 0–1.9)
BILIRUB SERPL-MCNC: 1 MG/DL (ref 0.1–1)
BILIRUB SERPL-MCNC: 1.1 MG/DL (ref 0.1–1)
BODY FLD TYPE: NORMAL
BODY FLUID SOURCE, LDH: NORMAL
BUN SERPL-MCNC: 160 MG/DL (ref 8–23)
BUN SERPL-MCNC: 167 MG/DL (ref 8–23)
BUN SERPL-MCNC: 179 MG/DL (ref 8–23)
BUN SERPL-MCNC: >140 MG/DL (ref 6–30)
CALCIUM SERPL-MCNC: 9 MG/DL (ref 8.7–10.5)
CALCIUM SERPL-MCNC: 9.4 MG/DL (ref 8.7–10.5)
CALCIUM SERPL-MCNC: 9.7 MG/DL (ref 8.7–10.5)
CHLORIDE SERPL-SCNC: 89 MMOL/L (ref 95–110)
CHLORIDE SERPL-SCNC: 91 MMOL/L (ref 95–110)
CHLORIDE SERPL-SCNC: 91 MMOL/L (ref 95–110)
CHLORIDE SERPL-SCNC: 93 MMOL/L (ref 95–110)
CHLORIDE SERPL-SCNC: 94 MMOL/L (ref 95–110)
CHLORIDE SERPL-SCNC: 97 MMOL/L (ref 95–110)
CO2 SERPL-SCNC: 15 MMOL/L (ref 23–29)
CO2 SERPL-SCNC: 20 MMOL/L (ref 23–29)
CO2 SERPL-SCNC: 21 MMOL/L (ref 23–29)
COLOR FLD: NORMAL
CREAT SERPL-MCNC: 3.1 MG/DL (ref 0.5–1.4)
CREAT SERPL-MCNC: 3.3 MG/DL (ref 0.5–1.4)
CREAT SERPL-MCNC: 4.1 MG/DL (ref 0.5–1.4)
CREAT SERPL-MCNC: 8.3 MG/DL (ref 0.5–1.4)
CREAT SERPL-MCNC: 8.3 MG/DL (ref 0.5–1.4)
CREAT SERPL-MCNC: 8.9 MG/DL (ref 0.5–1.4)
DELSYS: ABNORMAL
DELSYS: ABNORMAL
DELSYS: NORMAL
DIFFERENTIAL METHOD: ABNORMAL
EOSINOPHIL # BLD AUTO: 0 K/UL (ref 0–0.5)
EOSINOPHIL NFR BLD: 0.1 % (ref 0–8)
EOSINOPHIL NFR BLD: 0.1 % (ref 0–8)
EOSINOPHIL NFR BLD: 0.2 % (ref 0–8)
EOSINOPHIL NFR BLD: 0.3 % (ref 0–8)
EOSINOPHIL NFR BLD: 0.3 % (ref 0–8)
ERYTHROCYTE [DISTWIDTH] IN BLOOD BY AUTOMATED COUNT: 16.4 % (ref 11.5–14.5)
ERYTHROCYTE [DISTWIDTH] IN BLOOD BY AUTOMATED COUNT: 16.5 % (ref 11.5–14.5)
ERYTHROCYTE [DISTWIDTH] IN BLOOD BY AUTOMATED COUNT: 16.5 % (ref 11.5–14.5)
EST. GFR  (AFRICAN AMERICAN): 15.6 ML/MIN/1.73 M^2
EST. GFR  (AFRICAN AMERICAN): 20.3 ML/MIN/1.73 M^2
EST. GFR  (AFRICAN AMERICAN): 21.9 ML/MIN/1.73 M^2
EST. GFR  (NON AFRICAN AMERICAN): 13.5 ML/MIN/1.73 M^2
EST. GFR  (NON AFRICAN AMERICAN): 17.6 ML/MIN/1.73 M^2
EST. GFR  (NON AFRICAN AMERICAN): 18.9 ML/MIN/1.73 M^2
FIBRINOGEN PPP-MCNC: 580 MG/DL (ref 182–400)
FIO2: 28
FIO2: 28
GLUCOSE FLD-MCNC: 231 MG/DL
GLUCOSE SERPL-MCNC: 130 MG/DL (ref 70–110)
GLUCOSE SERPL-MCNC: 138 MG/DL (ref 70–110)
GLUCOSE SERPL-MCNC: 157 MG/DL (ref 70–110)
GLUCOSE SERPL-MCNC: 235 MG/DL (ref 70–110)
GLUCOSE SERPL-MCNC: 292 MG/DL (ref 70–110)
GLUCOSE SERPL-MCNC: 316 MG/DL (ref 70–110)
GRAM STN SPEC: NORMAL
GRAM STN SPEC: NORMAL
HCO3 UR-SCNC: 20.9 MMOL/L (ref 24–28)
HCO3 UR-SCNC: 21.6 MMOL/L (ref 24–28)
HCT VFR BLD AUTO: 23.9 % (ref 40–54)
HCT VFR BLD AUTO: 23.9 % (ref 40–54)
HCT VFR BLD AUTO: 24.5 % (ref 40–54)
HCT VFR BLD AUTO: 24.5 % (ref 40–54)
HCT VFR BLD AUTO: 24.9 % (ref 40–54)
HCT VFR BLD CALC: 18 %PCV (ref 36–54)
HCT VFR BLD CALC: 21 %PCV (ref 36–54)
HCT VFR BLD CALC: 23 %PCV (ref 36–54)
HGB BLD-MCNC: 8.3 G/DL (ref 14–18)
HGB BLD-MCNC: 8.3 G/DL (ref 14–18)
HGB BLD-MCNC: 8.5 G/DL (ref 14–18)
HGB BLD-MCNC: 8.6 G/DL (ref 14–18)
HGB BLD-MCNC: 8.8 G/DL (ref 14–18)
IMM GRANULOCYTES # BLD AUTO: 0.25 K/UL (ref 0–0.04)
IMM GRANULOCYTES # BLD AUTO: 0.25 K/UL (ref 0–0.04)
IMM GRANULOCYTES # BLD AUTO: 0.26 K/UL (ref 0–0.04)
IMM GRANULOCYTES # BLD AUTO: 0.31 K/UL (ref 0–0.04)
IMM GRANULOCYTES # BLD AUTO: 0.33 K/UL (ref 0–0.04)
IMM GRANULOCYTES NFR BLD AUTO: 1.7 % (ref 0–0.5)
IMM GRANULOCYTES NFR BLD AUTO: 2 % (ref 0–0.5)
IMM GRANULOCYTES NFR BLD AUTO: 2.2 % (ref 0–0.5)
INR PPP: 1.5 (ref 0.8–1.2)
LDH FLD L TO P-CCNC: 278 U/L
LDH SERPL L TO P-CCNC: 0.85 MMOL/L (ref 0.5–2.2)
LDH SERPL L TO P-CCNC: 381 U/L (ref 110–260)
LYMPHOCYTES # BLD AUTO: 1 K/UL (ref 1–4.8)
LYMPHOCYTES # BLD AUTO: 1 K/UL (ref 1–4.8)
LYMPHOCYTES # BLD AUTO: 1.3 K/UL (ref 1–4.8)
LYMPHOCYTES # BLD AUTO: 1.3 K/UL (ref 1–4.8)
LYMPHOCYTES # BLD AUTO: 1.4 K/UL (ref 1–4.8)
LYMPHOCYTES NFR BLD: 6.9 % (ref 18–48)
LYMPHOCYTES NFR BLD: 7.1 % (ref 18–48)
LYMPHOCYTES NFR BLD: 8 % (ref 18–48)
LYMPHOCYTES NFR BLD: 9 % (ref 18–48)
LYMPHOCYTES NFR BLD: 9.3 % (ref 18–48)
LYMPHOCYTES NFR FLD MANUAL: 10 %
MAGNESIUM SERPL-MCNC: 1.8 MG/DL (ref 1.6–2.6)
MCH RBC QN AUTO: 26.9 PG (ref 27–31)
MCH RBC QN AUTO: 27.9 PG (ref 27–31)
MCH RBC QN AUTO: 27.9 PG (ref 27–31)
MCH RBC QN AUTO: 28 PG (ref 27–31)
MCH RBC QN AUTO: 28.2 PG (ref 27–31)
MCHC RBC AUTO-ENTMCNC: 33.9 G/DL (ref 32–36)
MCHC RBC AUTO-ENTMCNC: 34.7 G/DL (ref 32–36)
MCHC RBC AUTO-ENTMCNC: 34.7 G/DL (ref 32–36)
MCHC RBC AUTO-ENTMCNC: 35.3 G/DL (ref 32–36)
MCHC RBC AUTO-ENTMCNC: 36 G/DL (ref 32–36)
MCV RBC AUTO: 78 FL (ref 82–98)
MCV RBC AUTO: 79 FL (ref 82–98)
MCV RBC AUTO: 80 FL (ref 82–98)
MCV RBC AUTO: 81 FL (ref 82–98)
MCV RBC AUTO: 81 FL (ref 82–98)
MODE: ABNORMAL
MODE: NORMAL
MONOCYTES # BLD AUTO: 1 K/UL (ref 0.3–1)
MONOCYTES # BLD AUTO: 1 K/UL (ref 0.3–1)
MONOCYTES # BLD AUTO: 1.1 K/UL (ref 0.3–1)
MONOCYTES # BLD AUTO: 1.1 K/UL (ref 0.3–1)
MONOCYTES # BLD AUTO: 1.4 K/UL (ref 0.3–1)
MONOCYTES NFR BLD: 6.8 % (ref 4–15)
MONOCYTES NFR BLD: 7 % (ref 4–15)
MONOCYTES NFR BLD: 7 % (ref 4–15)
MONOCYTES NFR BLD: 7.3 % (ref 4–15)
MONOCYTES NFR BLD: 8.1 % (ref 4–15)
MONOS+MACROS NFR FLD MANUAL: 15 %
NEUTROPHILS # BLD AUTO: 11.7 K/UL (ref 1.8–7.7)
NEUTROPHILS # BLD AUTO: 12 K/UL (ref 1.8–7.7)
NEUTROPHILS # BLD AUTO: 12.2 K/UL (ref 1.8–7.7)
NEUTROPHILS # BLD AUTO: 12.8 K/UL (ref 1.8–7.7)
NEUTROPHILS # BLD AUTO: 13.6 K/UL (ref 1.8–7.7)
NEUTROPHILS NFR BLD: 81.4 % (ref 38–73)
NEUTROPHILS NFR BLD: 81.5 % (ref 38–73)
NEUTROPHILS NFR BLD: 81.5 % (ref 38–73)
NEUTROPHILS NFR BLD: 83.6 % (ref 38–73)
NEUTROPHILS NFR BLD: 84.2 % (ref 38–73)
NEUTROPHILS NFR FLD MANUAL: 75 %
NRBC BLD-RTO: 0 /100 WBC
PCO2 BLDA: 35.2 MMHG (ref 35–45)
PCO2 BLDA: 36.6 MMHG (ref 35–45)
PH SMN: 7.38 [PH] (ref 7.35–7.45)
PH SMN: 7.38 [PH] (ref 7.35–7.45)
PHOSPHATE SERPL-MCNC: 4.2 MG/DL (ref 2.7–4.5)
PLATELET # BLD AUTO: 185 K/UL (ref 150–450)
PLATELET # BLD AUTO: 196 K/UL (ref 150–450)
PLATELET # BLD AUTO: 205 K/UL (ref 150–450)
PLATELET # BLD AUTO: 206 K/UL (ref 150–450)
PLATELET # BLD AUTO: 209 K/UL (ref 150–450)
PMV BLD AUTO: 9 FL (ref 9.2–12.9)
PMV BLD AUTO: 9.3 FL (ref 9.2–12.9)
PMV BLD AUTO: 9.5 FL (ref 9.2–12.9)
PMV BLD AUTO: 9.6 FL (ref 9.2–12.9)
PMV BLD AUTO: 9.6 FL (ref 9.2–12.9)
PO2 BLDA: 35 MMHG (ref 40–60)
PO2 BLDA: 39 MMHG (ref 40–60)
POC BE: -3 MMOL/L
POC BE: -4 MMOL/L
POC IONIZED CALCIUM: 0.62 MMOL/L (ref 1.06–1.42)
POC IONIZED CALCIUM: 0.99 MMOL/L (ref 1.06–1.42)
POC IONIZED CALCIUM: 1.17 MMOL/L (ref 1.06–1.42)
POC SATURATED O2: 67 % (ref 95–100)
POC SATURATED O2: 72 % (ref 95–100)
POC TCO2 (MEASURED): 10 MMOL/L (ref 23–29)
POC TCO2 (MEASURED): 18 MMOL/L (ref 23–29)
POC TCO2 (MEASURED): 20 MMOL/L (ref 23–29)
POC TCO2: 22 MMOL/L (ref 24–29)
POC TCO2: 23 MMOL/L (ref 24–29)
POCT GLUCOSE: 306 MG/DL (ref 70–110)
POCT GLUCOSE: 317 MG/DL (ref 70–110)
POTASSIUM BLD-SCNC: 5.5 MMOL/L (ref 3.5–5.1)
POTASSIUM BLD-SCNC: 6.3 MMOL/L (ref 3.5–5.1)
POTASSIUM BLD-SCNC: 8.1 MMOL/L (ref 3.5–5.1)
POTASSIUM SERPL-SCNC: 5.2 MMOL/L (ref 3.5–5.1)
POTASSIUM SERPL-SCNC: 5.4 MMOL/L (ref 3.5–5.1)
POTASSIUM SERPL-SCNC: 5.5 MMOL/L (ref 3.5–5.1)
PROT FLD-MCNC: 4 G/DL
PROT SERPL-MCNC: 6.3 G/DL (ref 6–8.4)
PROT SERPL-MCNC: 6.8 G/DL (ref 6–8.4)
PROTHROMBIN TIME: 15.6 SEC (ref 9–12.5)
PROTHROMBIN TIME: 15.8 SEC (ref 9–12.5)
PROTHROMBIN TIME: 16.1 SEC (ref 9–12.5)
PROTHROMBIN TIME: 16.4 SEC (ref 9–12.5)
RBC # BLD AUTO: 2.97 M/UL (ref 4.6–6.2)
RBC # BLD AUTO: 3.04 M/UL (ref 4.6–6.2)
RBC # BLD AUTO: 3.05 M/UL (ref 4.6–6.2)
RBC # BLD AUTO: 3.08 M/UL (ref 4.6–6.2)
RBC # BLD AUTO: 3.15 M/UL (ref 4.6–6.2)
SAMPLE: ABNORMAL
SAMPLE: NORMAL
SITE: ABNORMAL
SITE: ABNORMAL
SITE: NORMAL
SODIUM BLD-SCNC: 115 MMOL/L (ref 136–145)
SODIUM BLD-SCNC: 121 MMOL/L (ref 136–145)
SODIUM BLD-SCNC: 123 MMOL/L (ref 136–145)
SODIUM SERPL-SCNC: 122 MMOL/L (ref 136–145)
SODIUM SERPL-SCNC: 125 MMOL/L (ref 136–145)
SODIUM SERPL-SCNC: 126 MMOL/L (ref 136–145)
SPECIMEN SOURCE: NORMAL
VANCOMYCIN SERPL-MCNC: 12.2 UG/ML
WBC # BLD AUTO: 14.31 K/UL (ref 3.9–12.7)
WBC # BLD AUTO: 14.33 K/UL (ref 3.9–12.7)
WBC # BLD AUTO: 14.98 K/UL (ref 3.9–12.7)
WBC # BLD AUTO: 15.14 K/UL (ref 3.9–12.7)
WBC # BLD AUTO: 16.71 K/UL (ref 3.9–12.7)
WBC # FLD: 1733 /CU MM

## 2021-05-24 PROCEDURE — 83735 ASSAY OF MAGNESIUM: CPT | Performed by: STUDENT IN AN ORGANIZED HEALTH CARE EDUCATION/TRAINING PROGRAM

## 2021-05-24 PROCEDURE — 25000003 PHARM REV CODE 250: Performed by: STUDENT IN AN ORGANIZED HEALTH CARE EDUCATION/TRAINING PROGRAM

## 2021-05-24 PROCEDURE — 84157 ASSAY OF PROTEIN OTHER: CPT | Performed by: INTERNAL MEDICINE

## 2021-05-24 PROCEDURE — 85025 COMPLETE CBC W/AUTO DIFF WBC: CPT | Mod: 91 | Performed by: INTERNAL MEDICINE

## 2021-05-24 PROCEDURE — 89051 BODY FLUID CELL COUNT: CPT | Performed by: INTERNAL MEDICINE

## 2021-05-24 PROCEDURE — 25000003 PHARM REV CODE 250: Performed by: INTERNAL MEDICINE

## 2021-05-24 PROCEDURE — 87205 SMEAR GRAM STAIN: CPT | Performed by: INTERNAL MEDICINE

## 2021-05-24 PROCEDURE — 87040 BLOOD CULTURE FOR BACTERIA: CPT | Performed by: INTERNAL MEDICINE

## 2021-05-24 PROCEDURE — 88112 CYTOPATH CELL ENHANCE TECH: CPT | Performed by: PATHOLOGY

## 2021-05-24 PROCEDURE — 94761 N-INVAS EAR/PLS OXIMETRY MLT: CPT

## 2021-05-24 PROCEDURE — 88305 TISSUE EXAM BY PATHOLOGIST: CPT | Mod: 26,,, | Performed by: PATHOLOGY

## 2021-05-24 PROCEDURE — 88112 CYTOPATH CELL ENHANCE TECH: CPT | Mod: 26,,, | Performed by: PATHOLOGY

## 2021-05-24 PROCEDURE — 99223 PR INITIAL HOSPITAL CARE,LEVL III: ICD-10-PCS | Mod: ,,, | Performed by: INTERNAL MEDICINE

## 2021-05-24 PROCEDURE — 82945 GLUCOSE OTHER FLUID: CPT | Performed by: INTERNAL MEDICINE

## 2021-05-24 PROCEDURE — 99900035 HC TECH TIME PER 15 MIN (STAT)

## 2021-05-24 PROCEDURE — 88305 TISSUE EXAM BY PATHOLOGIST: CPT | Performed by: PATHOLOGY

## 2021-05-24 PROCEDURE — 87186 SC STD MICRODIL/AGAR DIL: CPT | Performed by: INTERNAL MEDICINE

## 2021-05-24 PROCEDURE — 83615 LACTATE (LD) (LDH) ENZYME: CPT | Performed by: INTERNAL MEDICINE

## 2021-05-24 PROCEDURE — 99223 1ST HOSP IP/OBS HIGH 75: CPT | Mod: ,,, | Performed by: INTERNAL MEDICINE

## 2021-05-24 PROCEDURE — 99232 SBSQ HOSP IP/OBS MODERATE 35: CPT | Mod: ,,, | Performed by: PHYSICIAN ASSISTANT

## 2021-05-24 PROCEDURE — 63600175 PHARM REV CODE 636 W HCPCS: Performed by: STUDENT IN AN ORGANIZED HEALTH CARE EDUCATION/TRAINING PROGRAM

## 2021-05-24 PROCEDURE — 87070 CULTURE OTHR SPECIMN AEROBIC: CPT | Performed by: INTERNAL MEDICINE

## 2021-05-24 PROCEDURE — C9113 INJ PANTOPRAZOLE SODIUM, VIA: HCPCS | Performed by: STUDENT IN AN ORGANIZED HEALTH CARE EDUCATION/TRAINING PROGRAM

## 2021-05-24 PROCEDURE — 87077 CULTURE AEROBIC IDENTIFY: CPT | Performed by: INTERNAL MEDICINE

## 2021-05-24 PROCEDURE — 83605 ASSAY OF LACTIC ACID: CPT

## 2021-05-24 PROCEDURE — 80053 COMPREHEN METABOLIC PANEL: CPT | Performed by: STUDENT IN AN ORGANIZED HEALTH CARE EDUCATION/TRAINING PROGRAM

## 2021-05-24 PROCEDURE — 80202 ASSAY OF VANCOMYCIN: CPT | Performed by: STUDENT IN AN ORGANIZED HEALTH CARE EDUCATION/TRAINING PROGRAM

## 2021-05-24 PROCEDURE — 63600175 PHARM REV CODE 636 W HCPCS: Performed by: INTERNAL MEDICINE

## 2021-05-24 PROCEDURE — 82042 OTHER SOURCE ALBUMIN QUAN EA: CPT | Performed by: INTERNAL MEDICINE

## 2021-05-24 PROCEDURE — 85610 PROTHROMBIN TIME: CPT | Mod: 91 | Performed by: STUDENT IN AN ORGANIZED HEALTH CARE EDUCATION/TRAINING PROGRAM

## 2021-05-24 PROCEDURE — 85025 COMPLETE CBC W/AUTO DIFF WBC: CPT | Mod: 91 | Performed by: STUDENT IN AN ORGANIZED HEALTH CARE EDUCATION/TRAINING PROGRAM

## 2021-05-24 PROCEDURE — 88112 PR  CYTOPATH, CELL ENHANCE TECH: ICD-10-PCS | Mod: 26,,, | Performed by: PATHOLOGY

## 2021-05-24 PROCEDURE — 85730 THROMBOPLASTIN TIME PARTIAL: CPT | Mod: 91 | Performed by: STUDENT IN AN ORGANIZED HEALTH CARE EDUCATION/TRAINING PROGRAM

## 2021-05-24 PROCEDURE — 99232 PR SUBSEQUENT HOSPITAL CARE,LEVL II: ICD-10-PCS | Mod: ,,, | Performed by: PHYSICIAN ASSISTANT

## 2021-05-24 PROCEDURE — 83615 LACTATE (LD) (LDH) ENZYME: CPT | Mod: 91 | Performed by: INTERNAL MEDICINE

## 2021-05-24 PROCEDURE — 25000003 PHARM REV CODE 250

## 2021-05-24 PROCEDURE — 80048 BASIC METABOLIC PNL TOTAL CA: CPT | Performed by: STUDENT IN AN ORGANIZED HEALTH CARE EDUCATION/TRAINING PROGRAM

## 2021-05-24 PROCEDURE — 99233 PR SUBSEQUENT HOSPITAL CARE,LEVL III: ICD-10-PCS | Mod: ,,, | Performed by: INTERNAL MEDICINE

## 2021-05-24 PROCEDURE — 20000000 HC ICU ROOM

## 2021-05-24 PROCEDURE — 88305 TISSUE EXAM BY PATHOLOGIST: ICD-10-PCS | Mod: 26,,, | Performed by: PATHOLOGY

## 2021-05-24 PROCEDURE — 27000221 HC OXYGEN, UP TO 24 HOURS

## 2021-05-24 PROCEDURE — 80053 COMPREHEN METABOLIC PANEL: CPT | Mod: 91 | Performed by: INTERNAL MEDICINE

## 2021-05-24 PROCEDURE — 85384 FIBRINOGEN ACTIVITY: CPT | Performed by: EMERGENCY MEDICINE

## 2021-05-24 PROCEDURE — 84100 ASSAY OF PHOSPHORUS: CPT | Performed by: STUDENT IN AN ORGANIZED HEALTH CARE EDUCATION/TRAINING PROGRAM

## 2021-05-24 PROCEDURE — 82803 BLOOD GASES ANY COMBINATION: CPT

## 2021-05-24 PROCEDURE — 85730 THROMBOPLASTIN TIME PARTIAL: CPT | Performed by: STUDENT IN AN ORGANIZED HEALTH CARE EDUCATION/TRAINING PROGRAM

## 2021-05-24 PROCEDURE — 99233 SBSQ HOSP IP/OBS HIGH 50: CPT | Mod: ,,, | Performed by: INTERNAL MEDICINE

## 2021-05-24 RX ORDER — SODIUM CHLORIDE 0.9 % (FLUSH) 0.9 %
3 SYRINGE (ML) INJECTION
Status: DISCONTINUED | OUTPATIENT
Start: 2021-05-24 | End: 2021-06-16 | Stop reason: HOSPADM

## 2021-05-24 RX ORDER — ASPIRIN 81 MG/1
81 TABLET ORAL DAILY
Status: DISCONTINUED | OUTPATIENT
Start: 2021-05-24 | End: 2021-05-25

## 2021-05-24 RX ORDER — HEPARIN SODIUM,PORCINE/D5W 25000/250
0-40 INTRAVENOUS SOLUTION INTRAVENOUS CONTINUOUS
Status: DISCONTINUED | OUTPATIENT
Start: 2021-05-24 | End: 2021-05-25

## 2021-05-24 RX ORDER — INSULIN ASPART 100 [IU]/ML
0-5 INJECTION, SOLUTION INTRAVENOUS; SUBCUTANEOUS
Status: DISCONTINUED | OUTPATIENT
Start: 2021-05-24 | End: 2021-06-16 | Stop reason: HOSPADM

## 2021-05-24 RX ORDER — IBUPROFEN 200 MG
24 TABLET ORAL
Status: DISCONTINUED | OUTPATIENT
Start: 2021-05-24 | End: 2021-06-16 | Stop reason: HOSPADM

## 2021-05-24 RX ORDER — GLUCAGON 1 MG
1 KIT INJECTION
Status: DISCONTINUED | OUTPATIENT
Start: 2021-05-24 | End: 2021-06-16 | Stop reason: HOSPADM

## 2021-05-24 RX ORDER — LIDOCAINE HYDROCHLORIDE 10 MG/ML
INJECTION INFILTRATION; PERINEURAL
Status: COMPLETED
Start: 2021-05-24 | End: 2021-05-24

## 2021-05-24 RX ORDER — MAGNESIUM SULFATE HEPTAHYDRATE 40 MG/ML
2 INJECTION, SOLUTION INTRAVENOUS ONCE
Status: COMPLETED | OUTPATIENT
Start: 2021-05-24 | End: 2021-05-24

## 2021-05-24 RX ORDER — LIDOCAINE HYDROCHLORIDE 10 MG/ML
10 INJECTION INFILTRATION; PERINEURAL ONCE
Status: COMPLETED | OUTPATIENT
Start: 2021-05-24 | End: 2021-05-24

## 2021-05-24 RX ORDER — SODIUM CHLORIDE 9 MG/ML
INJECTION, SOLUTION INTRAVENOUS
Status: DISCONTINUED | OUTPATIENT
Start: 2021-05-24 | End: 2021-06-16 | Stop reason: HOSPADM

## 2021-05-24 RX ORDER — IBUPROFEN 200 MG
16 TABLET ORAL
Status: DISCONTINUED | OUTPATIENT
Start: 2021-05-24 | End: 2021-06-16 | Stop reason: HOSPADM

## 2021-05-24 RX ORDER — DOBUTAMINE HYDROCHLORIDE 400 MG/100ML
2.5 INJECTION INTRAVENOUS CONTINUOUS
Status: DISCONTINUED | OUTPATIENT
Start: 2021-05-24 | End: 2021-05-29

## 2021-05-24 RX ORDER — CEFEPIME HYDROCHLORIDE 2 G/1
2 INJECTION, POWDER, FOR SOLUTION INTRAVENOUS
Status: DISCONTINUED | OUTPATIENT
Start: 2021-05-24 | End: 2021-05-26

## 2021-05-24 RX ORDER — HEPARIN SODIUM,PORCINE/D5W 25000/250
0-40 INTRAVENOUS SOLUTION INTRAVENOUS CONTINUOUS
Status: DISCONTINUED | OUTPATIENT
Start: 2021-05-24 | End: 2021-05-24

## 2021-05-24 RX ADMIN — SODIUM CHLORIDE: 0.9 INJECTION, SOLUTION INTRAVENOUS at 05:05

## 2021-05-24 RX ADMIN — MUPIROCIN: 20 OINTMENT TOPICAL at 08:05

## 2021-05-24 RX ADMIN — VANCOMYCIN HYDROCHLORIDE 1250 MG: 1.25 INJECTION, POWDER, LYOPHILIZED, FOR SOLUTION INTRAVENOUS at 01:05

## 2021-05-24 RX ADMIN — LIDOCAINE HYDROCHLORIDE 8 ML: 10 INJECTION, SOLUTION INFILTRATION; PERINEURAL at 01:05

## 2021-05-24 RX ADMIN — PANTOPRAZOLE SODIUM 40 MG: 40 INJECTION, POWDER, FOR SOLUTION INTRAVENOUS at 08:05

## 2021-05-24 RX ADMIN — CEFEPIME 2 G: 2 INJECTION, POWDER, FOR SOLUTION INTRAVENOUS at 11:05

## 2021-05-24 RX ADMIN — VASOPRESSIN 0.04 UNITS/MIN: 20 INJECTION INTRAVENOUS at 05:05

## 2021-05-24 RX ADMIN — VASOPRESSIN 0.04 UNITS/MIN: 20 INJECTION INTRAVENOUS at 10:05

## 2021-05-24 RX ADMIN — Medication 0.22 MCG/KG/MIN: at 02:05

## 2021-05-24 RX ADMIN — MAGNESIUM SULFATE 2 G: 2 INJECTION INTRAVENOUS at 05:05

## 2021-05-24 RX ADMIN — NOREPINEPHRINE BITARTRATE 0.26 MCG/KG/MIN: 1 INJECTION, SOLUTION, CONCENTRATE INTRAVENOUS at 05:05

## 2021-05-24 RX ADMIN — LIDOCAINE HYDROCHLORIDE 8 ML: 10 INJECTION INFILTRATION; PERINEURAL at 01:05

## 2021-05-24 RX ADMIN — ASPIRIN 81 MG: 81 TABLET, COATED ORAL at 11:05

## 2021-05-24 RX ADMIN — VASOPRESSIN 0.04 UNITS/MIN: 20 INJECTION INTRAVENOUS at 02:05

## 2021-05-24 RX ADMIN — DOBUTAMINE HYDROCHLORIDE 2.5 MCG/KG/MIN: 400 INJECTION INTRAVENOUS at 09:05

## 2021-05-24 RX ADMIN — INSULIN ASPART 4 UNITS: 100 INJECTION, SOLUTION INTRAVENOUS; SUBCUTANEOUS at 06:05

## 2021-05-24 RX ADMIN — INSULIN ASPART 2 UNITS: 100 INJECTION, SOLUTION INTRAVENOUS; SUBCUTANEOUS at 09:05

## 2021-05-24 RX ADMIN — HEPARIN SODIUM AND DEXTROSE 12 UNITS/KG/HR: 10000; 5 INJECTION INTRAVENOUS at 03:05

## 2021-05-24 RX ADMIN — HYDROCODONE BITARTRATE AND ACETAMINOPHEN 1 TABLET: 5; 325 TABLET ORAL at 08:05

## 2021-05-24 RX ADMIN — HYDROCODONE BITARTRATE AND ACETAMINOPHEN 1 TABLET: 5; 325 TABLET ORAL at 04:05

## 2021-05-25 LAB
ALBUMIN SERPL BCP-MCNC: 2.6 G/DL (ref 3.5–5.2)
ALLENS TEST: ABNORMAL
ALP SERPL-CCNC: 105 U/L (ref 55–135)
ALT SERPL W/O P-5'-P-CCNC: 16 U/L (ref 10–44)
ANION GAP SERPL CALC-SCNC: 12 MMOL/L (ref 8–16)
ANION GAP SERPL CALC-SCNC: 8 MMOL/L (ref 8–16)
ANISOCYTOSIS BLD QL SMEAR: SLIGHT
ANISOCYTOSIS BLD QL SMEAR: SLIGHT
APTT BLDCRRT: 55.2 SEC (ref 21–32)
APTT BLDCRRT: 59 SEC (ref 21–32)
APTT BLDCRRT: 64 SEC (ref 21–32)
AST SERPL-CCNC: 14 U/L (ref 10–40)
BASO STIPL BLD QL SMEAR: ABNORMAL
BASO STIPL BLD QL SMEAR: ABNORMAL
BASOPHILS # BLD AUTO: 0.03 K/UL (ref 0–0.2)
BASOPHILS # BLD AUTO: 0.04 K/UL (ref 0–0.2)
BASOPHILS NFR BLD: 0.2 % (ref 0–1.9)
BASOPHILS NFR BLD: 0.3 % (ref 0–1.9)
BASOPHILS NFR BLD: 0.3 % (ref 0–1.9)
BASOPHILS NFR BLD: 0.4 % (ref 0–1.9)
BILIRUB SERPL-MCNC: 0.9 MG/DL (ref 0.1–1)
BLD PROD TYP BPU: NORMAL
BLOOD UNIT EXPIRATION DATE: NORMAL
BLOOD UNIT TYPE CODE: 5100
BLOOD UNIT TYPE: NORMAL
BUN SERPL-MCNC: 131 MG/DL (ref 8–23)
BUN SERPL-MCNC: 135 MG/DL (ref 8–23)
BUN SERPL-MCNC: 140 MG/DL (ref 8–23)
BUN SERPL-MCNC: 146 MG/DL (ref 8–23)
CALCIUM SERPL-MCNC: 9.2 MG/DL (ref 8.7–10.5)
CALCIUM SERPL-MCNC: 9.3 MG/DL (ref 8.7–10.5)
CALCIUM SERPL-MCNC: 9.4 MG/DL (ref 8.7–10.5)
CALCIUM SERPL-MCNC: 9.4 MG/DL (ref 8.7–10.5)
CHLORIDE SERPL-SCNC: 95 MMOL/L (ref 95–110)
CHLORIDE SERPL-SCNC: 95 MMOL/L (ref 95–110)
CHLORIDE SERPL-SCNC: 96 MMOL/L (ref 95–110)
CHLORIDE SERPL-SCNC: 97 MMOL/L (ref 95–110)
CO2 SERPL-SCNC: 18 MMOL/L (ref 23–29)
CO2 SERPL-SCNC: 19 MMOL/L (ref 23–29)
CO2 SERPL-SCNC: 19 MMOL/L (ref 23–29)
CO2 SERPL-SCNC: 23 MMOL/L (ref 23–29)
CODING SYSTEM: NORMAL
CREAT SERPL-MCNC: 2.1 MG/DL (ref 0.5–1.4)
CREAT SERPL-MCNC: 2.2 MG/DL (ref 0.5–1.4)
CREAT SERPL-MCNC: 2.4 MG/DL (ref 0.5–1.4)
CREAT SERPL-MCNC: 2.6 MG/DL (ref 0.5–1.4)
DELSYS: ABNORMAL
DIFFERENTIAL METHOD: ABNORMAL
DISPENSE STATUS: NORMAL
EOSINOPHIL # BLD AUTO: 0 K/UL (ref 0–0.5)
EOSINOPHIL # BLD AUTO: 0.1 K/UL (ref 0–0.5)
EOSINOPHIL NFR BLD: 0.1 % (ref 0–8)
EOSINOPHIL NFR BLD: 0.9 % (ref 0–8)
EOSINOPHIL NFR BLD: 1 % (ref 0–8)
EOSINOPHIL NFR BLD: 1.1 % (ref 0–8)
ERYTHROCYTE [DISTWIDTH] IN BLOOD BY AUTOMATED COUNT: 15.9 % (ref 11.5–14.5)
ERYTHROCYTE [DISTWIDTH] IN BLOOD BY AUTOMATED COUNT: 16.6 % (ref 11.5–14.5)
ERYTHROCYTE [DISTWIDTH] IN BLOOD BY AUTOMATED COUNT: 16.7 % (ref 11.5–14.5)
ERYTHROCYTE [DISTWIDTH] IN BLOOD BY AUTOMATED COUNT: 16.7 % (ref 11.5–14.5)
EST. GFR  (AFRICAN AMERICAN): 27.1 ML/MIN/1.73 M^2
EST. GFR  (AFRICAN AMERICAN): 29.8 ML/MIN/1.73 M^2
EST. GFR  (AFRICAN AMERICAN): 33.1 ML/MIN/1.73 M^2
EST. GFR  (AFRICAN AMERICAN): 35 ML/MIN/1.73 M^2
EST. GFR  (NON AFRICAN AMERICAN): 23.4 ML/MIN/1.73 M^2
EST. GFR  (NON AFRICAN AMERICAN): 25.8 ML/MIN/1.73 M^2
EST. GFR  (NON AFRICAN AMERICAN): 28.7 ML/MIN/1.73 M^2
EST. GFR  (NON AFRICAN AMERICAN): 30.3 ML/MIN/1.73 M^2
FIBRINOGEN PPP-MCNC: 551 MG/DL (ref 182–400)
GLUCOSE SERPL-MCNC: 268 MG/DL (ref 70–110)
GLUCOSE SERPL-MCNC: 272 MG/DL (ref 70–110)
GLUCOSE SERPL-MCNC: 285 MG/DL (ref 70–110)
GLUCOSE SERPL-MCNC: 285 MG/DL (ref 70–110)
HCO3 UR-SCNC: 18.8 MMOL/L (ref 24–28)
HCT VFR BLD AUTO: 19.6 % (ref 40–54)
HCT VFR BLD AUTO: 19.6 % (ref 40–54)
HCT VFR BLD AUTO: 20.6 % (ref 40–54)
HCT VFR BLD AUTO: 20.9 % (ref 40–54)
HCT VFR BLD AUTO: 22.2 % (ref 40–54)
HCT VFR BLD AUTO: 22.3 % (ref 40–54)
HGB BLD-MCNC: 6.5 G/DL (ref 14–18)
HGB BLD-MCNC: 6.5 G/DL (ref 14–18)
HGB BLD-MCNC: 7 G/DL (ref 14–18)
HGB BLD-MCNC: 7.4 G/DL (ref 14–18)
HGB BLD-MCNC: 7.7 G/DL (ref 14–18)
HGB BLD-MCNC: 7.7 G/DL (ref 14–18)
HYPOCHROMIA BLD QL SMEAR: ABNORMAL
HYPOCHROMIA BLD QL SMEAR: ABNORMAL
IMM GRANULOCYTES # BLD AUTO: 0.25 K/UL (ref 0–0.04)
IMM GRANULOCYTES # BLD AUTO: 0.26 K/UL (ref 0–0.04)
IMM GRANULOCYTES # BLD AUTO: 0.29 K/UL (ref 0–0.04)
IMM GRANULOCYTES # BLD AUTO: 0.36 K/UL (ref 0–0.04)
IMM GRANULOCYTES # BLD AUTO: 0.37 K/UL (ref 0–0.04)
IMM GRANULOCYTES # BLD AUTO: 0.37 K/UL (ref 0–0.04)
IMM GRANULOCYTES NFR BLD AUTO: 1.9 % (ref 0–0.5)
IMM GRANULOCYTES NFR BLD AUTO: 2 % (ref 0–0.5)
IMM GRANULOCYTES NFR BLD AUTO: 2.4 % (ref 0–0.5)
IMM GRANULOCYTES NFR BLD AUTO: 2.8 % (ref 0–0.5)
IMM GRANULOCYTES NFR BLD AUTO: 3 % (ref 0–0.5)
IMM GRANULOCYTES NFR BLD AUTO: 3 % (ref 0–0.5)
INR PPP: 1.5 (ref 0.8–1.2)
LYMPHOCYTES # BLD AUTO: 1.1 K/UL (ref 1–4.8)
LYMPHOCYTES # BLD AUTO: 1.1 K/UL (ref 1–4.8)
LYMPHOCYTES # BLD AUTO: 1.3 K/UL (ref 1–4.8)
LYMPHOCYTES # BLD AUTO: 1.5 K/UL (ref 1–4.8)
LYMPHOCYTES NFR BLD: 10.1 % (ref 18–48)
LYMPHOCYTES NFR BLD: 10.4 % (ref 18–48)
LYMPHOCYTES NFR BLD: 10.4 % (ref 18–48)
LYMPHOCYTES NFR BLD: 11.6 % (ref 18–48)
LYMPHOCYTES NFR BLD: 7.6 % (ref 18–48)
LYMPHOCYTES NFR BLD: 9.6 % (ref 18–48)
MAGNESIUM SERPL-MCNC: 2 MG/DL (ref 1.6–2.6)
MCH RBC QN AUTO: 27.5 PG (ref 27–31)
MCH RBC QN AUTO: 27.9 PG (ref 27–31)
MCH RBC QN AUTO: 27.9 PG (ref 27–31)
MCH RBC QN AUTO: 28 PG (ref 27–31)
MCH RBC QN AUTO: 28.5 PG (ref 27–31)
MCH RBC QN AUTO: 29.2 PG (ref 27–31)
MCHC RBC AUTO-ENTMCNC: 33.2 G/DL (ref 32–36)
MCHC RBC AUTO-ENTMCNC: 33.2 G/DL (ref 32–36)
MCHC RBC AUTO-ENTMCNC: 34 G/DL (ref 32–36)
MCHC RBC AUTO-ENTMCNC: 34.5 G/DL (ref 32–36)
MCHC RBC AUTO-ENTMCNC: 34.7 G/DL (ref 32–36)
MCHC RBC AUTO-ENTMCNC: 35.4 G/DL (ref 32–36)
MCV RBC AUTO: 81 FL (ref 82–98)
MCV RBC AUTO: 81 FL (ref 82–98)
MCV RBC AUTO: 82 FL (ref 82–98)
MCV RBC AUTO: 83 FL (ref 82–98)
MCV RBC AUTO: 84 FL (ref 82–98)
MCV RBC AUTO: 84 FL (ref 82–98)
MONOCYTES # BLD AUTO: 0.8 K/UL (ref 0.3–1)
MONOCYTES # BLD AUTO: 0.9 K/UL (ref 0.3–1)
MONOCYTES # BLD AUTO: 1 K/UL (ref 0.3–1)
MONOCYTES NFR BLD: 6.9 % (ref 4–15)
MONOCYTES NFR BLD: 7 % (ref 4–15)
MONOCYTES NFR BLD: 7 % (ref 4–15)
MONOCYTES NFR BLD: 7.2 % (ref 4–15)
MONOCYTES NFR BLD: 7.2 % (ref 4–15)
MONOCYTES NFR BLD: 7.3 % (ref 4–15)
NEUTROPHILS # BLD AUTO: 10.1 K/UL (ref 1.8–7.7)
NEUTROPHILS # BLD AUTO: 10.1 K/UL (ref 1.8–7.7)
NEUTROPHILS # BLD AUTO: 12.1 K/UL (ref 1.8–7.7)
NEUTROPHILS # BLD AUTO: 8.7 K/UL (ref 1.8–7.7)
NEUTROPHILS # BLD AUTO: 9.8 K/UL (ref 1.8–7.7)
NEUTROPHILS # BLD AUTO: 9.8 K/UL (ref 1.8–7.7)
NEUTROPHILS NFR BLD: 78.2 % (ref 38–73)
NEUTROPHILS NFR BLD: 78.3 % (ref 38–73)
NEUTROPHILS NFR BLD: 78.3 % (ref 38–73)
NEUTROPHILS NFR BLD: 78.5 % (ref 38–73)
NEUTROPHILS NFR BLD: 79.4 % (ref 38–73)
NEUTROPHILS NFR BLD: 83.2 % (ref 38–73)
NRBC BLD-RTO: 0 /100 WBC
OVALOCYTES BLD QL SMEAR: ABNORMAL
OVALOCYTES BLD QL SMEAR: ABNORMAL
PCO2 BLDA: 30.1 MMHG (ref 35–45)
PH SMN: 7.4 [PH] (ref 7.35–7.45)
PHOSPHATE SERPL-MCNC: 3.1 MG/DL (ref 2.7–4.5)
PLATELET # BLD AUTO: 153 K/UL (ref 150–450)
PLATELET # BLD AUTO: 189 K/UL (ref 150–450)
PLATELET # BLD AUTO: 192 K/UL (ref 150–450)
PLATELET # BLD AUTO: 192 K/UL (ref 150–450)
PLATELET # BLD AUTO: 195 K/UL (ref 150–450)
PLATELET # BLD AUTO: 196 K/UL (ref 150–450)
PLATELET BLD QL SMEAR: ABNORMAL
PLATELET BLD QL SMEAR: ABNORMAL
PMV BLD AUTO: 9.3 FL (ref 9.2–12.9)
PMV BLD AUTO: 9.4 FL (ref 9.2–12.9)
PMV BLD AUTO: 9.5 FL (ref 9.2–12.9)
PMV BLD AUTO: 9.7 FL (ref 9.2–12.9)
PMV BLD AUTO: 9.8 FL (ref 9.2–12.9)
PMV BLD AUTO: 9.8 FL (ref 9.2–12.9)
PO2 BLDA: 36 MMHG (ref 40–60)
POC BE: -6 MMOL/L
POC SATURATED O2: 70 % (ref 95–100)
POC TCO2: 20 MMOL/L (ref 24–29)
POCT GLUCOSE: 259 MG/DL (ref 70–110)
POCT GLUCOSE: 285 MG/DL (ref 70–110)
POCT GLUCOSE: 296 MG/DL (ref 70–110)
POCT GLUCOSE: 300 MG/DL (ref 70–110)
POIKILOCYTOSIS BLD QL SMEAR: SLIGHT
POIKILOCYTOSIS BLD QL SMEAR: SLIGHT
POLYCHROMASIA BLD QL SMEAR: ABNORMAL
POLYCHROMASIA BLD QL SMEAR: ABNORMAL
POTASSIUM SERPL-SCNC: 5.1 MMOL/L (ref 3.5–5.1)
POTASSIUM SERPL-SCNC: 5.1 MMOL/L (ref 3.5–5.1)
POTASSIUM SERPL-SCNC: 5.2 MMOL/L (ref 3.5–5.1)
POTASSIUM SERPL-SCNC: 5.3 MMOL/L (ref 3.5–5.1)
PROT SERPL-MCNC: 6.6 G/DL (ref 6–8.4)
PROTHROMBIN TIME: 15.9 SEC (ref 9–12.5)
PROTHROMBIN TIME: 16.1 SEC (ref 9–12.5)
PROTHROMBIN TIME: 16.4 SEC (ref 9–12.5)
RBC # BLD AUTO: 2.33 M/UL (ref 4.6–6.2)
RBC # BLD AUTO: 2.33 M/UL (ref 4.6–6.2)
RBC # BLD AUTO: 2.53 M/UL (ref 4.6–6.2)
RBC # BLD AUTO: 2.55 M/UL (ref 4.6–6.2)
RBC # BLD AUTO: 2.7 M/UL (ref 4.6–6.2)
RBC # BLD AUTO: 2.75 M/UL (ref 4.6–6.2)
SAMPLE: ABNORMAL
SITE: ABNORMAL
SMUDGE CELLS BLD QL SMEAR: PRESENT
SMUDGE CELLS BLD QL SMEAR: PRESENT
SODIUM SERPL-SCNC: 126 MMOL/L (ref 136–145)
SODIUM SERPL-SCNC: 126 MMOL/L (ref 136–145)
SODIUM SERPL-SCNC: 127 MMOL/L (ref 136–145)
SODIUM SERPL-SCNC: 127 MMOL/L (ref 136–145)
TRANS ERYTHROCYTES VOL PATIENT: NORMAL ML
VANCOMYCIN SERPL-MCNC: 18.8 UG/ML
WBC # BLD AUTO: 10.99 K/UL (ref 3.9–12.7)
WBC # BLD AUTO: 12.46 K/UL (ref 3.9–12.7)
WBC # BLD AUTO: 12.46 K/UL (ref 3.9–12.7)
WBC # BLD AUTO: 12.8 K/UL (ref 3.9–12.7)
WBC # BLD AUTO: 12.89 K/UL (ref 3.9–12.7)
WBC # BLD AUTO: 14.54 K/UL (ref 3.9–12.7)
WBC TOXIC VACUOLES BLD QL SMEAR: PRESENT
WBC TOXIC VACUOLES BLD QL SMEAR: PRESENT

## 2021-05-25 PROCEDURE — 63600175 PHARM REV CODE 636 W HCPCS: Performed by: INTERNAL MEDICINE

## 2021-05-25 PROCEDURE — 25000003 PHARM REV CODE 250: Performed by: INTERNAL MEDICINE

## 2021-05-25 PROCEDURE — 99900035 HC TECH TIME PER 15 MIN (STAT)

## 2021-05-25 PROCEDURE — C9113 INJ PANTOPRAZOLE SODIUM, VIA: HCPCS | Performed by: STUDENT IN AN ORGANIZED HEALTH CARE EDUCATION/TRAINING PROGRAM

## 2021-05-25 PROCEDURE — 85025 COMPLETE CBC W/AUTO DIFF WBC: CPT | Mod: 91 | Performed by: STUDENT IN AN ORGANIZED HEALTH CARE EDUCATION/TRAINING PROGRAM

## 2021-05-25 PROCEDURE — 85610 PROTHROMBIN TIME: CPT | Mod: 91 | Performed by: STUDENT IN AN ORGANIZED HEALTH CARE EDUCATION/TRAINING PROGRAM

## 2021-05-25 PROCEDURE — 85384 FIBRINOGEN ACTIVITY: CPT | Performed by: EMERGENCY MEDICINE

## 2021-05-25 PROCEDURE — 80048 BASIC METABOLIC PNL TOTAL CA: CPT | Performed by: STUDENT IN AN ORGANIZED HEALTH CARE EDUCATION/TRAINING PROGRAM

## 2021-05-25 PROCEDURE — 80202 ASSAY OF VANCOMYCIN: CPT | Performed by: INTERNAL MEDICINE

## 2021-05-25 PROCEDURE — 99233 PR SUBSEQUENT HOSPITAL CARE,LEVL III: ICD-10-PCS | Mod: ,,, | Performed by: INTERNAL MEDICINE

## 2021-05-25 PROCEDURE — 84100 ASSAY OF PHOSPHORUS: CPT | Performed by: STUDENT IN AN ORGANIZED HEALTH CARE EDUCATION/TRAINING PROGRAM

## 2021-05-25 PROCEDURE — 25000003 PHARM REV CODE 250: Performed by: STUDENT IN AN ORGANIZED HEALTH CARE EDUCATION/TRAINING PROGRAM

## 2021-05-25 PROCEDURE — 27000221 HC OXYGEN, UP TO 24 HOURS

## 2021-05-25 PROCEDURE — 85730 THROMBOPLASTIN TIME PARTIAL: CPT | Mod: 91 | Performed by: STUDENT IN AN ORGANIZED HEALTH CARE EDUCATION/TRAINING PROGRAM

## 2021-05-25 PROCEDURE — 99233 SBSQ HOSP IP/OBS HIGH 50: CPT | Mod: ,,, | Performed by: INTERNAL MEDICINE

## 2021-05-25 PROCEDURE — C9399 UNCLASSIFIED DRUGS OR BIOLOG: HCPCS | Performed by: STUDENT IN AN ORGANIZED HEALTH CARE EDUCATION/TRAINING PROGRAM

## 2021-05-25 PROCEDURE — 94761 N-INVAS EAR/PLS OXIMETRY MLT: CPT

## 2021-05-25 PROCEDURE — 80048 BASIC METABOLIC PNL TOTAL CA: CPT | Mod: 91 | Performed by: STUDENT IN AN ORGANIZED HEALTH CARE EDUCATION/TRAINING PROGRAM

## 2021-05-25 PROCEDURE — 20000000 HC ICU ROOM

## 2021-05-25 PROCEDURE — P9021 RED BLOOD CELLS UNIT: HCPCS | Performed by: STUDENT IN AN ORGANIZED HEALTH CARE EDUCATION/TRAINING PROGRAM

## 2021-05-25 PROCEDURE — 83735 ASSAY OF MAGNESIUM: CPT | Performed by: STUDENT IN AN ORGANIZED HEALTH CARE EDUCATION/TRAINING PROGRAM

## 2021-05-25 PROCEDURE — 63600175 PHARM REV CODE 636 W HCPCS: Performed by: STUDENT IN AN ORGANIZED HEALTH CARE EDUCATION/TRAINING PROGRAM

## 2021-05-25 PROCEDURE — 80053 COMPREHEN METABOLIC PANEL: CPT | Performed by: STUDENT IN AN ORGANIZED HEALTH CARE EDUCATION/TRAINING PROGRAM

## 2021-05-25 PROCEDURE — 85730 THROMBOPLASTIN TIME PARTIAL: CPT | Mod: 91 | Performed by: INTERNAL MEDICINE

## 2021-05-25 PROCEDURE — 85730 THROMBOPLASTIN TIME PARTIAL: CPT | Performed by: STUDENT IN AN ORGANIZED HEALTH CARE EDUCATION/TRAINING PROGRAM

## 2021-05-25 RX ORDER — HYDROCODONE BITARTRATE AND ACETAMINOPHEN 500; 5 MG/1; MG/1
TABLET ORAL CONTINUOUS
Status: DISCONTINUED | OUTPATIENT
Start: 2021-05-25 | End: 2021-05-25

## 2021-05-25 RX ORDER — POLYETHYLENE GLYCOL 3350 17 G/17G
17 POWDER, FOR SOLUTION ORAL DAILY
Status: DISCONTINUED | OUTPATIENT
Start: 2021-05-25 | End: 2021-06-16 | Stop reason: HOSPADM

## 2021-05-25 RX ORDER — HYDROCODONE BITARTRATE AND ACETAMINOPHEN 500; 5 MG/1; MG/1
TABLET ORAL
Status: DISCONTINUED | OUTPATIENT
Start: 2021-05-25 | End: 2021-05-27

## 2021-05-25 RX ORDER — MAGNESIUM SULFATE HEPTAHYDRATE 40 MG/ML
2 INJECTION, SOLUTION INTRAVENOUS
Status: DISCONTINUED | OUTPATIENT
Start: 2021-05-25 | End: 2021-05-25

## 2021-05-25 RX ADMIN — PANTOPRAZOLE SODIUM 40 MG: 40 INJECTION, POWDER, FOR SOLUTION INTRAVENOUS at 08:05

## 2021-05-25 RX ADMIN — HYDROCODONE BITARTRATE AND ACETAMINOPHEN 1 TABLET: 5; 325 TABLET ORAL at 07:05

## 2021-05-25 RX ADMIN — MUPIROCIN: 20 OINTMENT TOPICAL at 08:05

## 2021-05-25 RX ADMIN — INSULIN ASPART 3 UNITS: 100 INJECTION, SOLUTION INTRAVENOUS; SUBCUTANEOUS at 10:05

## 2021-05-25 RX ADMIN — NOREPINEPHRINE BITARTRATE 0.3 MCG/KG/MIN: 1 INJECTION, SOLUTION, CONCENTRATE INTRAVENOUS at 12:05

## 2021-05-25 RX ADMIN — HYDROCODONE BITARTRATE AND ACETAMINOPHEN 1 TABLET: 5; 325 TABLET ORAL at 08:05

## 2021-05-25 RX ADMIN — DOBUTAMINE HYDROCHLORIDE 2.5 MCG/KG/MIN: 400 INJECTION INTRAVENOUS at 04:05

## 2021-05-25 RX ADMIN — INSULIN ASPART 1 UNITS: 100 INJECTION, SOLUTION INTRAVENOUS; SUBCUTANEOUS at 09:05

## 2021-05-25 RX ADMIN — VASOPRESSIN 0.04 UNITS/MIN: 20 INJECTION INTRAVENOUS at 05:05

## 2021-05-25 RX ADMIN — DOCUSATE SODIUM 50 MG: 50 CAPSULE, LIQUID FILLED ORAL at 08:05

## 2021-05-25 RX ADMIN — VASOPRESSIN 0.04 UNITS/MIN: 20 INJECTION INTRAVENOUS at 08:05

## 2021-05-25 RX ADMIN — ASPIRIN 81 MG: 81 TABLET, COATED ORAL at 08:05

## 2021-05-25 RX ADMIN — HYDROCODONE BITARTRATE AND ACETAMINOPHEN 1 TABLET: 5; 325 TABLET ORAL at 02:05

## 2021-05-25 RX ADMIN — INSULIN DETEMIR 3 UNITS: 100 INJECTION, SOLUTION SUBCUTANEOUS at 10:05

## 2021-05-25 RX ADMIN — CEFEPIME 2 G: 2 INJECTION, POWDER, FOR SOLUTION INTRAVENOUS at 10:05

## 2021-05-25 RX ADMIN — VANCOMYCIN HYDROCHLORIDE 1250 MG: 1.25 INJECTION, POWDER, LYOPHILIZED, FOR SOLUTION INTRAVENOUS at 03:05

## 2021-05-25 RX ADMIN — POLYETHYLENE GLYCOL 3350 17 G: 17 POWDER, FOR SOLUTION ORAL at 08:05

## 2021-05-25 RX ADMIN — SODIUM CHLORIDE: 0.9 INJECTION, SOLUTION INTRAVENOUS at 12:05

## 2021-05-26 ENCOUNTER — ANESTHESIA EVENT (OUTPATIENT)
Dept: ENDOSCOPY | Facility: HOSPITAL | Age: 76
DRG: 314 | End: 2021-05-26
Payer: OTHER GOVERNMENT

## 2021-05-26 LAB
ALBUMIN SERPL BCP-MCNC: 2.4 G/DL (ref 3.5–5.2)
ALLENS TEST: ABNORMAL
ALLENS TEST: ABNORMAL
ALP SERPL-CCNC: 90 U/L (ref 55–135)
ALT SERPL W/O P-5'-P-CCNC: 15 U/L (ref 10–44)
ANION GAP SERPL CALC-SCNC: 10 MMOL/L (ref 8–16)
ANION GAP SERPL CALC-SCNC: 10 MMOL/L (ref 8–16)
ANION GAP SERPL CALC-SCNC: 11 MMOL/L (ref 8–16)
ANION GAP SERPL CALC-SCNC: 12 MMOL/L (ref 8–16)
APTT BLDCRRT: 42.9 SEC (ref 21–32)
AST SERPL-CCNC: 15 U/L (ref 10–40)
BACTERIA BLD CULT: ABNORMAL
BASOPHILS # BLD AUTO: 0.02 K/UL (ref 0–0.2)
BASOPHILS # BLD AUTO: 0.03 K/UL (ref 0–0.2)
BASOPHILS # BLD AUTO: 0.04 K/UL (ref 0–0.2)
BASOPHILS NFR BLD: 0.1 % (ref 0–1.9)
BASOPHILS NFR BLD: 0.2 % (ref 0–1.9)
BASOPHILS NFR BLD: 0.3 % (ref 0–1.9)
BILIRUB SERPL-MCNC: 0.7 MG/DL (ref 0.1–1)
BLD PROD TYP BPU: NORMAL
BLOOD UNIT EXPIRATION DATE: NORMAL
BLOOD UNIT TYPE CODE: 5100
BLOOD UNIT TYPE: NORMAL
BUN SERPL-MCNC: 112 MG/DL (ref 8–23)
BUN SERPL-MCNC: 113 MG/DL (ref 8–23)
BUN SERPL-MCNC: 118 MG/DL (ref 8–23)
BUN SERPL-MCNC: 119 MG/DL (ref 8–23)
CALCIUM SERPL-MCNC: 8.9 MG/DL (ref 8.7–10.5)
CALCIUM SERPL-MCNC: 9.1 MG/DL (ref 8.7–10.5)
CALCIUM SERPL-MCNC: 9.1 MG/DL (ref 8.7–10.5)
CALCIUM SERPL-MCNC: 9.2 MG/DL (ref 8.7–10.5)
CHLORIDE SERPL-SCNC: 96 MMOL/L (ref 95–110)
CHLORIDE SERPL-SCNC: 97 MMOL/L (ref 95–110)
CHLORIDE SERPL-SCNC: 98 MMOL/L (ref 95–110)
CHLORIDE SERPL-SCNC: 99 MMOL/L (ref 95–110)
CO2 SERPL-SCNC: 20 MMOL/L (ref 23–29)
CO2 SERPL-SCNC: 20 MMOL/L (ref 23–29)
CO2 SERPL-SCNC: 21 MMOL/L (ref 23–29)
CO2 SERPL-SCNC: 21 MMOL/L (ref 23–29)
CODING SYSTEM: NORMAL
CREAT SERPL-MCNC: 1.6 MG/DL (ref 0.5–1.4)
CREAT SERPL-MCNC: 1.7 MG/DL (ref 0.5–1.4)
D DIMER PPP IA.FEU-MCNC: 4.4 MG/L FEU
DAT IGG-SP REAG RBC-IMP: NORMAL
DELSYS: ABNORMAL
DELSYS: ABNORMAL
DIFFERENTIAL METHOD: ABNORMAL
DISPENSE STATUS: NORMAL
EOSINOPHIL # BLD AUTO: 0.1 K/UL (ref 0–0.5)
EOSINOPHIL NFR BLD: 0.6 % (ref 0–8)
EOSINOPHIL NFR BLD: 0.7 % (ref 0–8)
EOSINOPHIL NFR BLD: 1 % (ref 0–8)
ERYTHROCYTE [DISTWIDTH] IN BLOOD BY AUTOMATED COUNT: 15.9 % (ref 11.5–14.5)
ERYTHROCYTE [DISTWIDTH] IN BLOOD BY AUTOMATED COUNT: 15.9 % (ref 11.5–14.5)
ERYTHROCYTE [DISTWIDTH] IN BLOOD BY AUTOMATED COUNT: 16.5 % (ref 11.5–14.5)
EST. GFR  (AFRICAN AMERICAN): 45.2 ML/MIN/1.73 M^2
EST. GFR  (AFRICAN AMERICAN): 48.7 ML/MIN/1.73 M^2
EST. GFR  (NON AFRICAN AMERICAN): 39.1 ML/MIN/1.73 M^2
EST. GFR  (NON AFRICAN AMERICAN): 42.1 ML/MIN/1.73 M^2
FIBRINOGEN PPP-MCNC: 484 MG/DL (ref 182–400)
GLUCOSE SERPL-MCNC: 236 MG/DL (ref 70–110)
GLUCOSE SERPL-MCNC: 261 MG/DL (ref 70–110)
GLUCOSE SERPL-MCNC: 267 MG/DL (ref 70–110)
GLUCOSE SERPL-MCNC: 361 MG/DL (ref 70–110)
HAPTOGLOB SERPL-MCNC: 18 MG/DL (ref 30–250)
HCO3 UR-SCNC: 24.8 MMOL/L (ref 24–28)
HCO3 UR-SCNC: 25.3 MMOL/L (ref 24–28)
HCT VFR BLD AUTO: 20.8 % (ref 40–54)
HCT VFR BLD AUTO: 21 % (ref 40–54)
HCT VFR BLD AUTO: 22.6 % (ref 40–54)
HGB BLD-MCNC: 7 G/DL (ref 14–18)
HGB BLD-MCNC: 7.1 G/DL (ref 14–18)
HGB BLD-MCNC: 7.6 G/DL (ref 14–18)
IMM GRANULOCYTES # BLD AUTO: 0.3 K/UL (ref 0–0.04)
IMM GRANULOCYTES # BLD AUTO: 0.33 K/UL (ref 0–0.04)
IMM GRANULOCYTES # BLD AUTO: 0.34 K/UL (ref 0–0.04)
IMM GRANULOCYTES NFR BLD AUTO: 2.1 % (ref 0–0.5)
IMM GRANULOCYTES NFR BLD AUTO: 2.2 % (ref 0–0.5)
IMM GRANULOCYTES NFR BLD AUTO: 2.4 % (ref 0–0.5)
INR PPP: 1.5 (ref 0.8–1.2)
INR PPP: 1.6 (ref 0.8–1.2)
INR PPP: 1.7 (ref 0.8–1.2)
INR PPP: 1.8 (ref 0.8–1.2)
LDH SERPL L TO P-CCNC: 338 U/L (ref 110–260)
LYMPHOCYTES # BLD AUTO: 1.1 K/UL (ref 1–4.8)
LYMPHOCYTES # BLD AUTO: 1.3 K/UL (ref 1–4.8)
LYMPHOCYTES # BLD AUTO: 1.4 K/UL (ref 1–4.8)
LYMPHOCYTES NFR BLD: 10.1 % (ref 18–48)
LYMPHOCYTES NFR BLD: 7.7 % (ref 18–48)
LYMPHOCYTES NFR BLD: 8.8 % (ref 18–48)
MAGNESIUM SERPL-MCNC: 1.7 MG/DL (ref 1.6–2.6)
MCH RBC QN AUTO: 27.9 PG (ref 27–31)
MCH RBC QN AUTO: 28.2 PG (ref 27–31)
MCH RBC QN AUTO: 28.2 PG (ref 27–31)
MCHC RBC AUTO-ENTMCNC: 33.6 G/DL (ref 32–36)
MCHC RBC AUTO-ENTMCNC: 33.7 G/DL (ref 32–36)
MCHC RBC AUTO-ENTMCNC: 33.8 G/DL (ref 32–36)
MCV RBC AUTO: 83 FL (ref 82–98)
MCV RBC AUTO: 83 FL (ref 82–98)
MCV RBC AUTO: 84 FL (ref 82–98)
MIXING STUDIES PPP-IMP: NORMAL
MIXING STUDIES PPP-IMP: NORMAL
MODE: ABNORMAL
MODE: ABNORMAL
MONOCYTES # BLD AUTO: 0.9 K/UL (ref 0.3–1)
MONOCYTES # BLD AUTO: 1 K/UL (ref 0.3–1)
MONOCYTES # BLD AUTO: 1.1 K/UL (ref 0.3–1)
MONOCYTES NFR BLD: 6.1 % (ref 4–15)
MONOCYTES NFR BLD: 6.3 % (ref 4–15)
MONOCYTES NFR BLD: 7.6 % (ref 4–15)
NEUTROPHILS # BLD AUTO: 11.1 K/UL (ref 1.8–7.7)
NEUTROPHILS # BLD AUTO: 12.1 K/UL (ref 1.8–7.7)
NEUTROPHILS # BLD AUTO: 12.4 K/UL (ref 1.8–7.7)
NEUTROPHILS NFR BLD: 78.8 % (ref 38–73)
NEUTROPHILS NFR BLD: 81.8 % (ref 38–73)
NEUTROPHILS NFR BLD: 83.2 % (ref 38–73)
NRBC BLD-RTO: 0 /100 WBC
NUM UNITS TRANS PACKED RBC: NORMAL
PCO2 BLDA: 38.9 MMHG (ref 35–45)
PCO2 BLDA: 39.3 MMHG (ref 35–45)
PH SMN: 7.41 [PH] (ref 7.35–7.45)
PH SMN: 7.42 [PH] (ref 7.35–7.45)
PHOSPHATE SERPL-MCNC: 2.7 MG/DL (ref 2.7–4.5)
PLATELET # BLD AUTO: 157 K/UL (ref 150–450)
PLATELET # BLD AUTO: 159 K/UL (ref 150–450)
PLATELET # BLD AUTO: 177 K/UL (ref 150–450)
PMV BLD AUTO: 9.2 FL (ref 9.2–12.9)
PMV BLD AUTO: 9.5 FL (ref 9.2–12.9)
PMV BLD AUTO: 9.7 FL (ref 9.2–12.9)
PO2 BLDA: 27 MMHG (ref 40–60)
PO2 BLDA: 30 MMHG (ref 40–60)
POC BE: 0 MMOL/L
POC BE: 1 MMOL/L
POC SATURATED O2: 50 % (ref 95–100)
POC SATURATED O2: 58 % (ref 95–100)
POC TCO2: 26 MMOL/L (ref 24–29)
POC TCO2: 26 MMOL/L (ref 24–29)
POCT GLUCOSE: 207 MG/DL (ref 70–110)
POCT GLUCOSE: 259 MG/DL (ref 70–110)
POCT GLUCOSE: 260 MG/DL (ref 70–110)
POCT GLUCOSE: 319 MG/DL (ref 70–110)
POTASSIUM SERPL-SCNC: 4.6 MMOL/L (ref 3.5–5.1)
POTASSIUM SERPL-SCNC: 4.8 MMOL/L (ref 3.5–5.1)
POTASSIUM SERPL-SCNC: 4.9 MMOL/L (ref 3.5–5.1)
POTASSIUM SERPL-SCNC: 5.1 MMOL/L (ref 3.5–5.1)
PROT SERPL-MCNC: 6 G/DL (ref 6–8.4)
PROTHROMBIN TIME: 16.5 SEC (ref 9–12.5)
PROTHROMBIN TIME: 17.1 SEC (ref 9–12.5)
PROTHROMBIN TIME: 18.4 SEC (ref 9–12.5)
PROTHROMBIN TIME: 19.4 SEC (ref 9–12.5)
RBC # BLD AUTO: 2.48 M/UL (ref 4.6–6.2)
RBC # BLD AUTO: 2.52 M/UL (ref 4.6–6.2)
RBC # BLD AUTO: 2.72 M/UL (ref 4.6–6.2)
RETICS/RBC NFR AUTO: 6 % (ref 0.4–2)
SAMPLE: ABNORMAL
SAMPLE: ABNORMAL
SARS-COV-2 RNA RESP QL NAA+PROBE: NOT DETECTED
SITE: ABNORMAL
SITE: ABNORMAL
SODIUM SERPL-SCNC: 126 MMOL/L (ref 136–145)
SODIUM SERPL-SCNC: 129 MMOL/L (ref 136–145)
SODIUM SERPL-SCNC: 130 MMOL/L (ref 136–145)
SODIUM SERPL-SCNC: 130 MMOL/L (ref 136–145)
VANCOMYCIN SERPL-MCNC: 21.5 UG/ML
WBC # BLD AUTO: 14.02 K/UL (ref 3.9–12.7)
WBC # BLD AUTO: 14.5 K/UL (ref 3.9–12.7)
WBC # BLD AUTO: 15.21 K/UL (ref 3.9–12.7)

## 2021-05-26 PROCEDURE — 86920 COMPATIBILITY TEST SPIN: CPT | Performed by: STUDENT IN AN ORGANIZED HEALTH CARE EDUCATION/TRAINING PROGRAM

## 2021-05-26 PROCEDURE — 63600175 PHARM REV CODE 636 W HCPCS: Performed by: INTERNAL MEDICINE

## 2021-05-26 PROCEDURE — 85379 FIBRIN DEGRADATION QUANT: CPT | Performed by: STUDENT IN AN ORGANIZED HEALTH CARE EDUCATION/TRAINING PROGRAM

## 2021-05-26 PROCEDURE — 99223 1ST HOSP IP/OBS HIGH 75: CPT | Mod: ,,, | Performed by: INTERNAL MEDICINE

## 2021-05-26 PROCEDURE — 80202 ASSAY OF VANCOMYCIN: CPT | Performed by: INTERNAL MEDICINE

## 2021-05-26 PROCEDURE — 25000003 PHARM REV CODE 250: Performed by: STUDENT IN AN ORGANIZED HEALTH CARE EDUCATION/TRAINING PROGRAM

## 2021-05-26 PROCEDURE — 80053 COMPREHEN METABOLIC PANEL: CPT | Performed by: STUDENT IN AN ORGANIZED HEALTH CARE EDUCATION/TRAINING PROGRAM

## 2021-05-26 PROCEDURE — 83735 ASSAY OF MAGNESIUM: CPT | Performed by: STUDENT IN AN ORGANIZED HEALTH CARE EDUCATION/TRAINING PROGRAM

## 2021-05-26 PROCEDURE — 25000003 PHARM REV CODE 250: Performed by: INTERNAL MEDICINE

## 2021-05-26 PROCEDURE — P9016 RBC LEUKOCYTES REDUCED: HCPCS | Performed by: STUDENT IN AN ORGANIZED HEALTH CARE EDUCATION/TRAINING PROGRAM

## 2021-05-26 PROCEDURE — 87040 BLOOD CULTURE FOR BACTERIA: CPT | Performed by: STUDENT IN AN ORGANIZED HEALTH CARE EDUCATION/TRAINING PROGRAM

## 2021-05-26 PROCEDURE — 86900 BLOOD TYPING SEROLOGIC ABO: CPT | Performed by: INTERNAL MEDICINE

## 2021-05-26 PROCEDURE — 85025 COMPLETE CBC W/AUTO DIFF WBC: CPT | Mod: 91 | Performed by: STUDENT IN AN ORGANIZED HEALTH CARE EDUCATION/TRAINING PROGRAM

## 2021-05-26 PROCEDURE — 85045 AUTOMATED RETICULOCYTE COUNT: CPT | Performed by: STUDENT IN AN ORGANIZED HEALTH CARE EDUCATION/TRAINING PROGRAM

## 2021-05-26 PROCEDURE — C9399 UNCLASSIFIED DRUGS OR BIOLOG: HCPCS | Performed by: STUDENT IN AN ORGANIZED HEALTH CARE EDUCATION/TRAINING PROGRAM

## 2021-05-26 PROCEDURE — 94761 N-INVAS EAR/PLS OXIMETRY MLT: CPT

## 2021-05-26 PROCEDURE — 85611 PROTHROMBIN TEST: CPT | Performed by: STUDENT IN AN ORGANIZED HEALTH CARE EDUCATION/TRAINING PROGRAM

## 2021-05-26 PROCEDURE — 36430 TRANSFUSION BLD/BLD COMPNT: CPT

## 2021-05-26 PROCEDURE — 99900035 HC TECH TIME PER 15 MIN (STAT)

## 2021-05-26 PROCEDURE — 82803 BLOOD GASES ANY COMBINATION: CPT

## 2021-05-26 PROCEDURE — C9113 INJ PANTOPRAZOLE SODIUM, VIA: HCPCS | Performed by: STUDENT IN AN ORGANIZED HEALTH CARE EDUCATION/TRAINING PROGRAM

## 2021-05-26 PROCEDURE — 63600175 PHARM REV CODE 636 W HCPCS: Performed by: STUDENT IN AN ORGANIZED HEALTH CARE EDUCATION/TRAINING PROGRAM

## 2021-05-26 PROCEDURE — U0003 INFECTIOUS AGENT DETECTION BY NUCLEIC ACID (DNA OR RNA); SEVERE ACUTE RESPIRATORY SYNDROME CORONAVIRUS 2 (SARS-COV-2) (CORONAVIRUS DISEASE [COVID-19]), AMPLIFIED PROBE TECHNIQUE, MAKING USE OF HIGH THROUGHPUT TECHNOLOGIES AS DESCRIBED BY CMS-2020-01-R: HCPCS | Performed by: INTERNAL MEDICINE

## 2021-05-26 PROCEDURE — 85732 THROMBOPLASTIN TIME PARTIAL: CPT | Performed by: STUDENT IN AN ORGANIZED HEALTH CARE EDUCATION/TRAINING PROGRAM

## 2021-05-26 PROCEDURE — 99232 PR SUBSEQUENT HOSPITAL CARE,LEVL II: ICD-10-PCS | Mod: ,,, | Performed by: INTERNAL MEDICINE

## 2021-05-26 PROCEDURE — 85384 FIBRINOGEN ACTIVITY: CPT | Performed by: EMERGENCY MEDICINE

## 2021-05-26 PROCEDURE — 83010 ASSAY OF HAPTOGLOBIN QUANT: CPT | Performed by: STUDENT IN AN ORGANIZED HEALTH CARE EDUCATION/TRAINING PROGRAM

## 2021-05-26 PROCEDURE — 99232 PR SUBSEQUENT HOSPITAL CARE,LEVL II: ICD-10-PCS | Mod: ,,, | Performed by: HOSPITALIST

## 2021-05-26 PROCEDURE — 80048 BASIC METABOLIC PNL TOTAL CA: CPT | Mod: 91 | Performed by: STUDENT IN AN ORGANIZED HEALTH CARE EDUCATION/TRAINING PROGRAM

## 2021-05-26 PROCEDURE — 99232 SBSQ HOSP IP/OBS MODERATE 35: CPT | Mod: ,,, | Performed by: NURSE PRACTITIONER

## 2021-05-26 PROCEDURE — 85610 PROTHROMBIN TIME: CPT | Mod: 91 | Performed by: STUDENT IN AN ORGANIZED HEALTH CARE EDUCATION/TRAINING PROGRAM

## 2021-05-26 PROCEDURE — 86880 COOMBS TEST DIRECT: CPT | Performed by: STUDENT IN AN ORGANIZED HEALTH CARE EDUCATION/TRAINING PROGRAM

## 2021-05-26 PROCEDURE — 36415 COLL VENOUS BLD VENIPUNCTURE: CPT | Performed by: STUDENT IN AN ORGANIZED HEALTH CARE EDUCATION/TRAINING PROGRAM

## 2021-05-26 PROCEDURE — 83615 LACTATE (LD) (LDH) ENZYME: CPT | Performed by: INTERNAL MEDICINE

## 2021-05-26 PROCEDURE — 99232 PR SUBSEQUENT HOSPITAL CARE,LEVL II: ICD-10-PCS | Mod: ,,, | Performed by: NURSE PRACTITIONER

## 2021-05-26 PROCEDURE — 99232 SBSQ HOSP IP/OBS MODERATE 35: CPT | Mod: ,,, | Performed by: HOSPITALIST

## 2021-05-26 PROCEDURE — 84100 ASSAY OF PHOSPHORUS: CPT | Performed by: STUDENT IN AN ORGANIZED HEALTH CARE EDUCATION/TRAINING PROGRAM

## 2021-05-26 PROCEDURE — 20000000 HC ICU ROOM

## 2021-05-26 PROCEDURE — 99223 PR INITIAL HOSPITAL CARE,LEVL III: ICD-10-PCS | Mod: ,,, | Performed by: INTERNAL MEDICINE

## 2021-05-26 PROCEDURE — 99232 SBSQ HOSP IP/OBS MODERATE 35: CPT | Mod: ,,, | Performed by: INTERNAL MEDICINE

## 2021-05-26 PROCEDURE — U0005 INFEC AGEN DETEC AMPLI PROBE: HCPCS | Performed by: INTERNAL MEDICINE

## 2021-05-26 PROCEDURE — 85730 THROMBOPLASTIN TIME PARTIAL: CPT | Performed by: INTERNAL MEDICINE

## 2021-05-26 RX ORDER — FUROSEMIDE 10 MG/ML
40 INJECTION INTRAMUSCULAR; INTRAVENOUS DAILY
Status: DISCONTINUED | OUTPATIENT
Start: 2021-05-26 | End: 2021-05-29

## 2021-05-26 RX ORDER — HYDROCODONE BITARTRATE AND ACETAMINOPHEN 500; 5 MG/1; MG/1
TABLET ORAL
Status: DISCONTINUED | OUTPATIENT
Start: 2021-05-26 | End: 2021-05-27

## 2021-05-26 RX ORDER — CEFEPIME HYDROCHLORIDE 2 G/1
2 INJECTION, POWDER, FOR SOLUTION INTRAVENOUS
Status: DISCONTINUED | OUTPATIENT
Start: 2021-05-26 | End: 2021-05-27

## 2021-05-26 RX ORDER — VANCOMYCIN HCL IN 5 % DEXTROSE 1G/250ML
1000 PLASTIC BAG, INJECTION (ML) INTRAVENOUS
Status: DISCONTINUED | OUTPATIENT
Start: 2021-05-26 | End: 2021-06-01

## 2021-05-26 RX ORDER — LACTULOSE 10 G/15ML
200 SOLUTION ORAL; RECTAL ONCE
Status: DISCONTINUED | OUTPATIENT
Start: 2021-05-26 | End: 2021-05-28

## 2021-05-26 RX ADMIN — PANTOPRAZOLE SODIUM 40 MG: 40 INJECTION, POWDER, FOR SOLUTION INTRAVENOUS at 08:05

## 2021-05-26 RX ADMIN — NOREPINEPHRINE BITARTRATE 0.2 MCG/KG/MIN: 1 INJECTION, SOLUTION, CONCENTRATE INTRAVENOUS at 05:05

## 2021-05-26 RX ADMIN — INSULIN DETEMIR 3 UNITS: 100 INJECTION, SOLUTION SUBCUTANEOUS at 08:05

## 2021-05-26 RX ADMIN — VASOPRESSIN 0.04 UNITS/MIN: 20 INJECTION INTRAVENOUS at 09:05

## 2021-05-26 RX ADMIN — INSULIN ASPART 1 UNITS: 100 INJECTION, SOLUTION INTRAVENOUS; SUBCUTANEOUS at 08:05

## 2021-05-26 RX ADMIN — MUPIROCIN: 20 OINTMENT TOPICAL at 08:05

## 2021-05-26 RX ADMIN — VASOPRESSIN 0.04 UNITS/MIN: 20 INJECTION INTRAVENOUS at 02:05

## 2021-05-26 RX ADMIN — DOCUSATE SODIUM 50 MG: 50 CAPSULE, LIQUID FILLED ORAL at 08:05

## 2021-05-26 RX ADMIN — HYDROCODONE BITARTRATE AND ACETAMINOPHEN 1 TABLET: 5; 325 TABLET ORAL at 08:05

## 2021-05-26 RX ADMIN — FUROSEMIDE 40 MG: 10 INJECTION, SOLUTION INTRAMUSCULAR; INTRAVENOUS at 11:05

## 2021-05-26 RX ADMIN — VASOPRESSIN 0.04 UNITS/MIN: 20 INJECTION INTRAVENOUS at 11:05

## 2021-05-26 RX ADMIN — INSULIN ASPART 3 UNITS: 100 INJECTION, SOLUTION INTRAVENOUS; SUBCUTANEOUS at 12:05

## 2021-05-26 RX ADMIN — POLYETHYLENE GLYCOL 3350 17 G: 17 POWDER, FOR SOLUTION ORAL at 08:05

## 2021-05-26 RX ADMIN — CEFEPIME 2 G: 2 INJECTION, POWDER, FOR SOLUTION INTRAVENOUS at 08:05

## 2021-05-26 RX ADMIN — VANCOMYCIN HYDROCHLORIDE 1000 MG: 1 INJECTION, POWDER, LYOPHILIZED, FOR SOLUTION INTRAVENOUS at 08:05

## 2021-05-26 RX ADMIN — INSULIN ASPART 3 UNITS: 100 INJECTION, SOLUTION INTRAVENOUS; SUBCUTANEOUS at 08:05

## 2021-05-26 RX ADMIN — INSULIN ASPART 5 UNITS: 100 INJECTION, SOLUTION INTRAVENOUS; SUBCUTANEOUS at 05:05

## 2021-05-27 ENCOUNTER — ANESTHESIA (OUTPATIENT)
Dept: ENDOSCOPY | Facility: HOSPITAL | Age: 76
DRG: 314 | End: 2021-05-27
Payer: OTHER GOVERNMENT

## 2021-05-27 PROBLEM — R57.9 SHOCK: Status: RESOLVED | Noted: 2021-05-23 | Resolved: 2021-05-27

## 2021-05-27 LAB
ABO + RH BLD: NORMAL
ALBUMIN SERPL BCP-MCNC: 2.3 G/DL (ref 3.5–5.2)
ALLENS TEST: ABNORMAL
ALP SERPL-CCNC: 79 U/L (ref 55–135)
ALT SERPL W/O P-5'-P-CCNC: 13 U/L (ref 10–44)
ANION GAP SERPL CALC-SCNC: 10 MMOL/L (ref 8–16)
ANION GAP SERPL CALC-SCNC: 10 MMOL/L (ref 8–16)
ANION GAP SERPL CALC-SCNC: 12 MMOL/L (ref 8–16)
AST SERPL-CCNC: 16 U/L (ref 10–40)
BACTERIA SPEC AEROBE CULT: NO GROWTH
BASOPHILS # BLD AUTO: 0.02 K/UL (ref 0–0.2)
BASOPHILS # BLD AUTO: 0.03 K/UL (ref 0–0.2)
BASOPHILS # BLD AUTO: 0.04 K/UL (ref 0–0.2)
BASOPHILS # BLD AUTO: 0.06 K/UL (ref 0–0.2)
BASOPHILS NFR BLD: 0.1 % (ref 0–1.9)
BASOPHILS NFR BLD: 0.2 % (ref 0–1.9)
BASOPHILS NFR BLD: 0.3 % (ref 0–1.9)
BASOPHILS NFR BLD: 0.4 % (ref 0–1.9)
BILIRUB SERPL-MCNC: 0.8 MG/DL (ref 0.1–1)
BLD GP AB SCN CELLS X3 SERPL QL: NORMAL
BUN SERPL-MCNC: 100 MG/DL (ref 8–23)
BUN SERPL-MCNC: 107 MG/DL (ref 8–23)
BUN SERPL-MCNC: 93 MG/DL (ref 8–23)
CALCIUM SERPL-MCNC: 8.9 MG/DL (ref 8.7–10.5)
CALCIUM SERPL-MCNC: 9.1 MG/DL (ref 8.7–10.5)
CALCIUM SERPL-MCNC: 9.4 MG/DL (ref 8.7–10.5)
CHLORIDE SERPL-SCNC: 100 MMOL/L (ref 95–110)
CHLORIDE SERPL-SCNC: 98 MMOL/L (ref 95–110)
CHLORIDE SERPL-SCNC: 99 MMOL/L (ref 95–110)
CO2 SERPL-SCNC: 23 MMOL/L (ref 23–29)
CO2 SERPL-SCNC: 23 MMOL/L (ref 23–29)
CO2 SERPL-SCNC: 24 MMOL/L (ref 23–29)
CREAT SERPL-MCNC: 1.4 MG/DL (ref 0.5–1.4)
CREAT SERPL-MCNC: 1.4 MG/DL (ref 0.5–1.4)
CREAT SERPL-MCNC: 1.5 MG/DL (ref 0.5–1.4)
DELSYS: ABNORMAL
DIFFERENTIAL METHOD: ABNORMAL
EOSINOPHIL # BLD AUTO: 0.3 K/UL (ref 0–0.5)
EOSINOPHIL # BLD AUTO: 0.4 K/UL (ref 0–0.5)
EOSINOPHIL NFR BLD: 2 % (ref 0–8)
EOSINOPHIL NFR BLD: 2.5 % (ref 0–8)
EOSINOPHIL NFR BLD: 2.6 % (ref 0–8)
EOSINOPHIL NFR BLD: 2.7 % (ref 0–8)
ERYTHROCYTE [DISTWIDTH] IN BLOOD BY AUTOMATED COUNT: 16.8 % (ref 11.5–14.5)
ERYTHROCYTE [DISTWIDTH] IN BLOOD BY AUTOMATED COUNT: 17 % (ref 11.5–14.5)
ERYTHROCYTE [DISTWIDTH] IN BLOOD BY AUTOMATED COUNT: 17.2 % (ref 11.5–14.5)
ERYTHROCYTE [DISTWIDTH] IN BLOOD BY AUTOMATED COUNT: 17.4 % (ref 11.5–14.5)
EST. GFR  (AFRICAN AMERICAN): 52.6 ML/MIN/1.73 M^2
EST. GFR  (AFRICAN AMERICAN): 57.2 ML/MIN/1.73 M^2
EST. GFR  (AFRICAN AMERICAN): 57.2 ML/MIN/1.73 M^2
EST. GFR  (NON AFRICAN AMERICAN): 45.5 ML/MIN/1.73 M^2
EST. GFR  (NON AFRICAN AMERICAN): 49.5 ML/MIN/1.73 M^2
EST. GFR  (NON AFRICAN AMERICAN): 49.5 ML/MIN/1.73 M^2
FIBRINOGEN PPP-MCNC: 450 MG/DL (ref 182–400)
FINAL PATHOLOGIC DIAGNOSIS: NORMAL
GLUCOSE SERPL-MCNC: 201 MG/DL (ref 70–110)
GLUCOSE SERPL-MCNC: 224 MG/DL (ref 70–110)
GLUCOSE SERPL-MCNC: 255 MG/DL (ref 70–110)
HCO3 UR-SCNC: 27.5 MMOL/L (ref 24–28)
HCT VFR BLD AUTO: 21.5 % (ref 40–54)
HCT VFR BLD AUTO: 21.8 % (ref 40–54)
HCT VFR BLD AUTO: 22.2 % (ref 40–54)
HCT VFR BLD AUTO: 23.4 % (ref 40–54)
HGB BLD-MCNC: 7.2 G/DL (ref 14–18)
HGB BLD-MCNC: 7.3 G/DL (ref 14–18)
HGB BLD-MCNC: 7.5 G/DL (ref 14–18)
HGB BLD-MCNC: 7.9 G/DL (ref 14–18)
IMM GRANULOCYTES # BLD AUTO: 0.29 K/UL (ref 0–0.04)
IMM GRANULOCYTES # BLD AUTO: 0.3 K/UL (ref 0–0.04)
IMM GRANULOCYTES # BLD AUTO: 0.34 K/UL (ref 0–0.04)
IMM GRANULOCYTES # BLD AUTO: 0.38 K/UL (ref 0–0.04)
IMM GRANULOCYTES NFR BLD AUTO: 2 % (ref 0–0.5)
IMM GRANULOCYTES NFR BLD AUTO: 2.2 % (ref 0–0.5)
IMM GRANULOCYTES NFR BLD AUTO: 2.6 % (ref 0–0.5)
IMM GRANULOCYTES NFR BLD AUTO: 2.7 % (ref 0–0.5)
INR PPP: 1.2 (ref 0.8–1.2)
INR PPP: 1.4 (ref 0.8–1.2)
INR PPP: 1.7 (ref 0.8–1.2)
INR PPP: 1.9 (ref 0.8–1.2)
LYMPHOCYTES # BLD AUTO: 1.5 K/UL (ref 1–4.8)
LYMPHOCYTES # BLD AUTO: 1.6 K/UL (ref 1–4.8)
LYMPHOCYTES # BLD AUTO: 1.7 K/UL (ref 1–4.8)
LYMPHOCYTES # BLD AUTO: 1.9 K/UL (ref 1–4.8)
LYMPHOCYTES NFR BLD: 11.1 % (ref 18–48)
LYMPHOCYTES NFR BLD: 12.1 % (ref 18–48)
LYMPHOCYTES NFR BLD: 12.3 % (ref 18–48)
LYMPHOCYTES NFR BLD: 13 % (ref 18–48)
Lab: NORMAL
MAGNESIUM SERPL-MCNC: 1.7 MG/DL (ref 1.6–2.6)
MCH RBC QN AUTO: 28.1 PG (ref 27–31)
MCH RBC QN AUTO: 28.3 PG (ref 27–31)
MCH RBC QN AUTO: 28.4 PG (ref 27–31)
MCH RBC QN AUTO: 28.6 PG (ref 27–31)
MCHC RBC AUTO-ENTMCNC: 33.5 G/DL (ref 32–36)
MCHC RBC AUTO-ENTMCNC: 33.5 G/DL (ref 32–36)
MCHC RBC AUTO-ENTMCNC: 33.8 G/DL (ref 32–36)
MCHC RBC AUTO-ENTMCNC: 33.8 G/DL (ref 32–36)
MCV RBC AUTO: 84 FL (ref 82–98)
MCV RBC AUTO: 86 FL (ref 82–98)
MONOCYTES # BLD AUTO: 1 K/UL (ref 0.3–1)
MONOCYTES NFR BLD: 6.5 % (ref 4–15)
MONOCYTES NFR BLD: 7.1 % (ref 4–15)
MONOCYTES NFR BLD: 7.4 % (ref 4–15)
MONOCYTES NFR BLD: 7.5 % (ref 4–15)
NEUTROPHILS # BLD AUTO: 10.1 K/UL (ref 1.8–7.7)
NEUTROPHILS # BLD AUTO: 10.1 K/UL (ref 1.8–7.7)
NEUTROPHILS # BLD AUTO: 10.5 K/UL (ref 1.8–7.7)
NEUTROPHILS # BLD AUTO: 11.1 K/UL (ref 1.8–7.7)
NEUTROPHILS NFR BLD: 75.4 % (ref 38–73)
NEUTROPHILS NFR BLD: 75.6 % (ref 38–73)
NEUTROPHILS NFR BLD: 75.6 % (ref 38–73)
NEUTROPHILS NFR BLD: 76.1 % (ref 38–73)
NRBC BLD-RTO: 0 /100 WBC
PATH REV BLD -IMP: NORMAL
PCO2 BLDA: 42.9 MMHG (ref 35–45)
PH SMN: 7.42 [PH] (ref 7.35–7.45)
PHOSPHATE SERPL-MCNC: 2.9 MG/DL (ref 2.7–4.5)
PLATELET # BLD AUTO: 165 K/UL (ref 150–450)
PLATELET # BLD AUTO: 167 K/UL (ref 150–450)
PLATELET # BLD AUTO: 182 K/UL (ref 150–450)
PLATELET # BLD AUTO: 192 K/UL (ref 150–450)
PMV BLD AUTO: 9.3 FL (ref 9.2–12.9)
PMV BLD AUTO: 9.4 FL (ref 9.2–12.9)
PMV BLD AUTO: 9.8 FL (ref 9.2–12.9)
PMV BLD AUTO: 9.9 FL (ref 9.2–12.9)
PO2 BLDA: 32 MMHG (ref 40–60)
POC BE: 3 MMOL/L
POC SATURATED O2: 61 % (ref 95–100)
POC TCO2: 29 MMOL/L (ref 24–29)
POCT GLUCOSE: 231 MG/DL (ref 70–110)
POCT GLUCOSE: 241 MG/DL (ref 70–110)
POCT GLUCOSE: 259 MG/DL (ref 70–110)
POCT GLUCOSE: 325 MG/DL (ref 70–110)
POTASSIUM SERPL-SCNC: 4.5 MMOL/L (ref 3.5–5.1)
POTASSIUM SERPL-SCNC: 4.6 MMOL/L (ref 3.5–5.1)
POTASSIUM SERPL-SCNC: 4.8 MMOL/L (ref 3.5–5.1)
PROT SERPL-MCNC: 5.7 G/DL (ref 6–8.4)
PROTHROMBIN TIME: 13.3 SEC (ref 9–12.5)
PROTHROMBIN TIME: 14.7 SEC (ref 9–12.5)
PROTHROMBIN TIME: 18.5 SEC (ref 9–12.5)
PROTHROMBIN TIME: 19.5 SEC (ref 9–12.5)
RBC # BLD AUTO: 2.55 M/UL (ref 4.6–6.2)
RBC # BLD AUTO: 2.56 M/UL (ref 4.6–6.2)
RBC # BLD AUTO: 2.65 M/UL (ref 4.6–6.2)
RBC # BLD AUTO: 2.78 M/UL (ref 4.6–6.2)
SAMPLE: ABNORMAL
SITE: ABNORMAL
SODIUM SERPL-SCNC: 132 MMOL/L (ref 136–145)
SODIUM SERPL-SCNC: 133 MMOL/L (ref 136–145)
SODIUM SERPL-SCNC: 134 MMOL/L (ref 136–145)
WBC # BLD AUTO: 13.3 K/UL (ref 3.9–12.7)
WBC # BLD AUTO: 13.32 K/UL (ref 3.9–12.7)
WBC # BLD AUTO: 13.9 K/UL (ref 3.9–12.7)
WBC # BLD AUTO: 14.65 K/UL (ref 3.9–12.7)

## 2021-05-27 PROCEDURE — 99900035 HC TECH TIME PER 15 MIN (STAT)

## 2021-05-27 PROCEDURE — 80048 BASIC METABOLIC PNL TOTAL CA: CPT | Performed by: STUDENT IN AN ORGANIZED HEALTH CARE EDUCATION/TRAINING PROGRAM

## 2021-05-27 PROCEDURE — 94761 N-INVAS EAR/PLS OXIMETRY MLT: CPT

## 2021-05-27 PROCEDURE — D9220A PRA ANESTHESIA: ICD-10-PCS | Mod: ,,, | Performed by: ANESTHESIOLOGY

## 2021-05-27 PROCEDURE — 84100 ASSAY OF PHOSPHORUS: CPT | Performed by: STUDENT IN AN ORGANIZED HEALTH CARE EDUCATION/TRAINING PROGRAM

## 2021-05-27 PROCEDURE — 83735 ASSAY OF MAGNESIUM: CPT | Performed by: STUDENT IN AN ORGANIZED HEALTH CARE EDUCATION/TRAINING PROGRAM

## 2021-05-27 PROCEDURE — 37000008 HC ANESTHESIA 1ST 15 MINUTES: Performed by: INTERNAL MEDICINE

## 2021-05-27 PROCEDURE — 99233 PR SUBSEQUENT HOSPITAL CARE,LEVL III: ICD-10-PCS | Mod: ,,, | Performed by: HOSPITALIST

## 2021-05-27 PROCEDURE — D9220A PRA ANESTHESIA: Mod: ,,, | Performed by: ANESTHESIOLOGY

## 2021-05-27 PROCEDURE — 43255 PR EGD, FLEX, W/CTRL BLEED, ANY METHOD: ICD-10-PCS | Mod: ,,, | Performed by: INTERNAL MEDICINE

## 2021-05-27 PROCEDURE — 37000009 HC ANESTHESIA EA ADD 15 MINS: Performed by: INTERNAL MEDICINE

## 2021-05-27 PROCEDURE — 20000000 HC ICU ROOM

## 2021-05-27 PROCEDURE — 85025 COMPLETE CBC W/AUTO DIFF WBC: CPT | Mod: 91 | Performed by: STUDENT IN AN ORGANIZED HEALTH CARE EDUCATION/TRAINING PROGRAM

## 2021-05-27 PROCEDURE — 25000003 PHARM REV CODE 250: Performed by: STUDENT IN AN ORGANIZED HEALTH CARE EDUCATION/TRAINING PROGRAM

## 2021-05-27 PROCEDURE — 85025 COMPLETE CBC W/AUTO DIFF WBC: CPT | Performed by: INTERNAL MEDICINE

## 2021-05-27 PROCEDURE — 63600175 PHARM REV CODE 636 W HCPCS: Performed by: STUDENT IN AN ORGANIZED HEALTH CARE EDUCATION/TRAINING PROGRAM

## 2021-05-27 PROCEDURE — 63600175 PHARM REV CODE 636 W HCPCS

## 2021-05-27 PROCEDURE — 25000003 PHARM REV CODE 250: Performed by: INTERNAL MEDICINE

## 2021-05-27 PROCEDURE — 63600175 PHARM REV CODE 636 W HCPCS: Performed by: NURSE ANESTHETIST, CERTIFIED REGISTERED

## 2021-05-27 PROCEDURE — 80053 COMPREHEN METABOLIC PANEL: CPT | Performed by: STUDENT IN AN ORGANIZED HEALTH CARE EDUCATION/TRAINING PROGRAM

## 2021-05-27 PROCEDURE — 63600175 PHARM REV CODE 636 W HCPCS: Performed by: INTERNAL MEDICINE

## 2021-05-27 PROCEDURE — 25000003 PHARM REV CODE 250: Performed by: NURSE ANESTHETIST, CERTIFIED REGISTERED

## 2021-05-27 PROCEDURE — 99233 PR SUBSEQUENT HOSPITAL CARE,LEVL III: ICD-10-PCS | Mod: ,,, | Performed by: INTERNAL MEDICINE

## 2021-05-27 PROCEDURE — 43255 EGD CONTROL BLEEDING ANY: CPT | Mod: ,,, | Performed by: INTERNAL MEDICINE

## 2021-05-27 PROCEDURE — 99233 SBSQ HOSP IP/OBS HIGH 50: CPT | Mod: ,,, | Performed by: HOSPITALIST

## 2021-05-27 PROCEDURE — 43255 EGD CONTROL BLEEDING ANY: CPT | Performed by: INTERNAL MEDICINE

## 2021-05-27 PROCEDURE — 99233 SBSQ HOSP IP/OBS HIGH 50: CPT | Mod: ,,, | Performed by: INTERNAL MEDICINE

## 2021-05-27 PROCEDURE — 27202087 HC PROBE, APC: Performed by: INTERNAL MEDICINE

## 2021-05-27 PROCEDURE — 85610 PROTHROMBIN TIME: CPT | Performed by: STUDENT IN AN ORGANIZED HEALTH CARE EDUCATION/TRAINING PROGRAM

## 2021-05-27 PROCEDURE — C9113 INJ PANTOPRAZOLE SODIUM, VIA: HCPCS | Performed by: STUDENT IN AN ORGANIZED HEALTH CARE EDUCATION/TRAINING PROGRAM

## 2021-05-27 PROCEDURE — 85384 FIBRINOGEN ACTIVITY: CPT | Performed by: EMERGENCY MEDICINE

## 2021-05-27 RX ORDER — PROPOFOL 10 MG/ML
VIAL (ML) INTRAVENOUS
Status: DISCONTINUED | OUTPATIENT
Start: 2021-05-27 | End: 2021-05-27

## 2021-05-27 RX ORDER — DEXMEDETOMIDINE HYDROCHLORIDE 100 UG/ML
INJECTION, SOLUTION INTRAVENOUS
Status: DISCONTINUED | OUTPATIENT
Start: 2021-05-27 | End: 2021-05-27

## 2021-05-27 RX ORDER — ETOMIDATE 2 MG/ML
INJECTION INTRAVENOUS
Status: DISCONTINUED | OUTPATIENT
Start: 2021-05-27 | End: 2021-05-27

## 2021-05-27 RX ORDER — MAGNESIUM SULFATE HEPTAHYDRATE 40 MG/ML
2 INJECTION, SOLUTION INTRAVENOUS ONCE
Status: COMPLETED | OUTPATIENT
Start: 2021-05-27 | End: 2021-05-27

## 2021-05-27 RX ADMIN — DEXMEDETOMIDINE HYDROCHLORIDE 8 MCG: 100 INJECTION, SOLUTION INTRAVENOUS at 02:05

## 2021-05-27 RX ADMIN — POLYETHYLENE GLYCOL 3350 17 G: 17 POWDER, FOR SOLUTION ORAL at 08:05

## 2021-05-27 RX ADMIN — PANTOPRAZOLE SODIUM 40 MG: 40 INJECTION, POWDER, FOR SOLUTION INTRAVENOUS at 08:05

## 2021-05-27 RX ADMIN — VASOPRESSIN 0.04 UNITS/MIN: 20 INJECTION INTRAVENOUS at 05:05

## 2021-05-27 RX ADMIN — DOCUSATE SODIUM 50 MG: 50 CAPSULE, LIQUID FILLED ORAL at 08:05

## 2021-05-27 RX ADMIN — INSULIN ASPART 3 UNITS: 100 INJECTION, SOLUTION INTRAVENOUS; SUBCUTANEOUS at 04:05

## 2021-05-27 RX ADMIN — INSULIN DETEMIR 3 UNITS: 100 INJECTION, SOLUTION SUBCUTANEOUS at 08:05

## 2021-05-27 RX ADMIN — VASOPRESSIN 0.04 UNITS/MIN: 20 INJECTION INTRAVENOUS at 06:05

## 2021-05-27 RX ADMIN — ETOMIDATE 6 MG: 2 INJECTION INTRAVENOUS at 02:05

## 2021-05-27 RX ADMIN — HYDROCODONE BITARTRATE AND ACETAMINOPHEN 1 TABLET: 5; 325 TABLET ORAL at 05:05

## 2021-05-27 RX ADMIN — MAGNESIUM SULFATE 2 G: 2 INJECTION INTRAVENOUS at 05:05

## 2021-05-27 RX ADMIN — MUPIROCIN: 20 OINTMENT TOPICAL at 08:05

## 2021-05-27 RX ADMIN — VANCOMYCIN HYDROCHLORIDE 1000 MG: 1 INJECTION, POWDER, LYOPHILIZED, FOR SOLUTION INTRAVENOUS at 09:05

## 2021-05-27 RX ADMIN — VASOPRESSIN 0.04 UNITS/MIN: 20 INJECTION INTRAVENOUS at 09:05

## 2021-05-27 RX ADMIN — PROPOFOL 30 MG: 10 INJECTION, EMULSION INTRAVENOUS at 02:05

## 2021-05-27 RX ADMIN — MUPIROCIN: 20 OINTMENT TOPICAL at 09:05

## 2021-05-27 RX ADMIN — FUROSEMIDE 40 MG: 10 INJECTION, SOLUTION INTRAMUSCULAR; INTRAVENOUS at 08:05

## 2021-05-27 RX ADMIN — HYDROCODONE BITARTRATE AND ACETAMINOPHEN 1 TABLET: 5; 325 TABLET ORAL at 11:05

## 2021-05-27 RX ADMIN — DOBUTAMINE HYDROCHLORIDE 2.5 MCG/KG/MIN: 400 INJECTION INTRAVENOUS at 03:05

## 2021-05-27 RX ADMIN — ETOMIDATE 12 MG: 2 INJECTION INTRAVENOUS at 02:05

## 2021-05-27 RX ADMIN — PHYTONADIONE 10 MG: 10 INJECTION, EMULSION INTRAMUSCULAR; INTRAVENOUS; SUBCUTANEOUS at 09:05

## 2021-05-27 RX ADMIN — ETOMIDATE 6 MG: 2 INJECTION INTRAVENOUS at 03:05

## 2021-05-27 RX ADMIN — INSULIN ASPART 1 UNITS: 100 INJECTION, SOLUTION INTRAVENOUS; SUBCUTANEOUS at 08:05

## 2021-05-27 RX ADMIN — INSULIN ASPART 2 UNITS: 100 INJECTION, SOLUTION INTRAVENOUS; SUBCUTANEOUS at 09:05

## 2021-05-28 LAB
ALBUMIN SERPL BCP-MCNC: 2.6 G/DL (ref 3.5–5.2)
ALLENS TEST: ABNORMAL
ALP SERPL-CCNC: 98 U/L (ref 55–135)
ALT SERPL W/O P-5'-P-CCNC: 17 U/L (ref 10–44)
ANION GAP SERPL CALC-SCNC: 11 MMOL/L (ref 8–16)
ANION GAP SERPL CALC-SCNC: 11 MMOL/L (ref 8–16)
ANION GAP SERPL CALC-SCNC: 12 MMOL/L (ref 8–16)
AST SERPL-CCNC: 19 U/L (ref 10–40)
BACTERIA BLD CULT: ABNORMAL
BASOPHILS # BLD AUTO: 0.05 K/UL (ref 0–0.2)
BASOPHILS # BLD AUTO: 0.05 K/UL (ref 0–0.2)
BASOPHILS NFR BLD: 0.3 % (ref 0–1.9)
BASOPHILS NFR BLD: 0.4 % (ref 0–1.9)
BILIRUB SERPL-MCNC: 0.9 MG/DL (ref 0.1–1)
BUN SERPL-MCNC: 66 MG/DL (ref 8–23)
BUN SERPL-MCNC: 78 MG/DL (ref 8–23)
BUN SERPL-MCNC: 80 MG/DL (ref 8–23)
CALCIUM SERPL-MCNC: 9.3 MG/DL (ref 8.7–10.5)
CALCIUM SERPL-MCNC: 9.4 MG/DL (ref 8.7–10.5)
CALCIUM SERPL-MCNC: 9.6 MG/DL (ref 8.7–10.5)
CHLORIDE SERPL-SCNC: 96 MMOL/L (ref 95–110)
CHLORIDE SERPL-SCNC: 97 MMOL/L (ref 95–110)
CHLORIDE SERPL-SCNC: 98 MMOL/L (ref 95–110)
CO2 SERPL-SCNC: 22 MMOL/L (ref 23–29)
CO2 SERPL-SCNC: 23 MMOL/L (ref 23–29)
CO2 SERPL-SCNC: 24 MMOL/L (ref 23–29)
CREAT SERPL-MCNC: 1.3 MG/DL (ref 0.5–1.4)
CREAT SERPL-MCNC: 1.4 MG/DL (ref 0.5–1.4)
CREAT SERPL-MCNC: 1.4 MG/DL (ref 0.5–1.4)
DELSYS: ABNORMAL
DIFFERENTIAL METHOD: ABNORMAL
DIFFERENTIAL METHOD: ABNORMAL
EOSINOPHIL # BLD AUTO: 0.3 K/UL (ref 0–0.5)
EOSINOPHIL # BLD AUTO: 0.4 K/UL (ref 0–0.5)
EOSINOPHIL NFR BLD: 1.7 % (ref 0–8)
EOSINOPHIL NFR BLD: 2.9 % (ref 0–8)
ERYTHROCYTE [DISTWIDTH] IN BLOOD BY AUTOMATED COUNT: 17.1 % (ref 11.5–14.5)
ERYTHROCYTE [DISTWIDTH] IN BLOOD BY AUTOMATED COUNT: 17.2 % (ref 11.5–14.5)
EST. GFR  (AFRICAN AMERICAN): 57.2 ML/MIN/1.73 M^2
EST. GFR  (AFRICAN AMERICAN): 57.2 ML/MIN/1.73 M^2
EST. GFR  (AFRICAN AMERICAN): >60 ML/MIN/1.73 M^2
EST. GFR  (NON AFRICAN AMERICAN): 49.5 ML/MIN/1.73 M^2
EST. GFR  (NON AFRICAN AMERICAN): 49.5 ML/MIN/1.73 M^2
EST. GFR  (NON AFRICAN AMERICAN): 54.1 ML/MIN/1.73 M^2
FIBRINOGEN PPP-MCNC: 541 MG/DL (ref 182–400)
GLUCOSE SERPL-MCNC: 243 MG/DL (ref 70–110)
GLUCOSE SERPL-MCNC: 279 MG/DL (ref 70–110)
GLUCOSE SERPL-MCNC: 280 MG/DL (ref 70–110)
HCO3 UR-SCNC: 27.1 MMOL/L (ref 24–28)
HCT VFR BLD AUTO: 22.3 % (ref 40–54)
HCT VFR BLD AUTO: 23.2 % (ref 40–54)
HGB BLD-MCNC: 7.6 G/DL (ref 14–18)
HGB BLD-MCNC: 7.9 G/DL (ref 14–18)
IMM GRANULOCYTES # BLD AUTO: 0.21 K/UL (ref 0–0.04)
IMM GRANULOCYTES # BLD AUTO: 0.23 K/UL (ref 0–0.04)
IMM GRANULOCYTES NFR BLD AUTO: 1.4 % (ref 0–0.5)
IMM GRANULOCYTES NFR BLD AUTO: 1.7 % (ref 0–0.5)
INR PPP: 1.1 (ref 0.8–1.2)
INR PPP: 1.2 (ref 0.8–1.2)
LYMPHOCYTES # BLD AUTO: 1.4 K/UL (ref 1–4.8)
LYMPHOCYTES # BLD AUTO: 1.6 K/UL (ref 1–4.8)
LYMPHOCYTES NFR BLD: 10.1 % (ref 18–48)
LYMPHOCYTES NFR BLD: 10.6 % (ref 18–48)
MAGNESIUM SERPL-MCNC: 1.8 MG/DL (ref 1.6–2.6)
MCH RBC QN AUTO: 28.6 PG (ref 27–31)
MCH RBC QN AUTO: 28.7 PG (ref 27–31)
MCHC RBC AUTO-ENTMCNC: 34.1 G/DL (ref 32–36)
MCHC RBC AUTO-ENTMCNC: 34.1 G/DL (ref 32–36)
MCV RBC AUTO: 84 FL (ref 82–98)
MCV RBC AUTO: 84 FL (ref 82–98)
MONOCYTES # BLD AUTO: 1 K/UL (ref 0.3–1)
MONOCYTES # BLD AUTO: 1.1 K/UL (ref 0.3–1)
MONOCYTES NFR BLD: 7.1 % (ref 4–15)
MONOCYTES NFR BLD: 7.5 % (ref 4–15)
NEUTROPHILS # BLD AUTO: 10.5 K/UL (ref 1.8–7.7)
NEUTROPHILS # BLD AUTO: 11.7 K/UL (ref 1.8–7.7)
NEUTROPHILS NFR BLD: 77.4 % (ref 38–73)
NEUTROPHILS NFR BLD: 78.9 % (ref 38–73)
NRBC BLD-RTO: 0 /100 WBC
NRBC BLD-RTO: 0 /100 WBC
PCO2 BLDA: 42.8 MMHG (ref 35–45)
PH SMN: 7.41 [PH] (ref 7.35–7.45)
PHOSPHATE SERPL-MCNC: 3.4 MG/DL (ref 2.7–4.5)
PLATELET # BLD AUTO: 190 K/UL (ref 150–450)
PLATELET # BLD AUTO: 203 K/UL (ref 150–450)
PMV BLD AUTO: 9.1 FL (ref 9.2–12.9)
PMV BLD AUTO: 9.4 FL (ref 9.2–12.9)
PO2 BLDA: 32 MMHG (ref 40–60)
POC BE: 2 MMOL/L
POC SATURATED O2: 62 % (ref 95–100)
POC TCO2: 28 MMOL/L (ref 24–29)
POCT GLUCOSE: 249 MG/DL (ref 70–110)
POCT GLUCOSE: 260 MG/DL (ref 70–110)
POCT GLUCOSE: 266 MG/DL (ref 70–110)
POCT GLUCOSE: 267 MG/DL (ref 70–110)
POCT GLUCOSE: 281 MG/DL (ref 70–110)
POCT GLUCOSE: 309 MG/DL (ref 70–110)
POTASSIUM SERPL-SCNC: 4.1 MMOL/L (ref 3.5–5.1)
POTASSIUM SERPL-SCNC: 4.2 MMOL/L (ref 3.5–5.1)
POTASSIUM SERPL-SCNC: 4.4 MMOL/L (ref 3.5–5.1)
PROT SERPL-MCNC: 6.6 G/DL (ref 6–8.4)
PROTHROMBIN TIME: 12 SEC (ref 9–12.5)
PROTHROMBIN TIME: 12 SEC (ref 9–12.5)
PROTHROMBIN TIME: 12.1 SEC (ref 9–12.5)
PROTHROMBIN TIME: 12.5 SEC (ref 9–12.5)
RBC # BLD AUTO: 2.65 M/UL (ref 4.6–6.2)
RBC # BLD AUTO: 2.76 M/UL (ref 4.6–6.2)
SAMPLE: ABNORMAL
SITE: ABNORMAL
SODIUM SERPL-SCNC: 131 MMOL/L (ref 136–145)
SODIUM SERPL-SCNC: 131 MMOL/L (ref 136–145)
SODIUM SERPL-SCNC: 132 MMOL/L (ref 136–145)
VANCOMYCIN TROUGH SERPL-MCNC: 17.7 UG/ML (ref 10–22)
WBC # BLD AUTO: 13.49 K/UL (ref 3.9–12.7)
WBC # BLD AUTO: 14.88 K/UL (ref 3.9–12.7)

## 2021-05-28 PROCEDURE — 25000003 PHARM REV CODE 250: Performed by: INTERNAL MEDICINE

## 2021-05-28 PROCEDURE — 97165 OT EVAL LOW COMPLEX 30 MIN: CPT

## 2021-05-28 PROCEDURE — 85610 PROTHROMBIN TIME: CPT | Performed by: STUDENT IN AN ORGANIZED HEALTH CARE EDUCATION/TRAINING PROGRAM

## 2021-05-28 PROCEDURE — 25000003 PHARM REV CODE 250: Performed by: STUDENT IN AN ORGANIZED HEALTH CARE EDUCATION/TRAINING PROGRAM

## 2021-05-28 PROCEDURE — 80048 BASIC METABOLIC PNL TOTAL CA: CPT | Performed by: STUDENT IN AN ORGANIZED HEALTH CARE EDUCATION/TRAINING PROGRAM

## 2021-05-28 PROCEDURE — 83735 ASSAY OF MAGNESIUM: CPT | Performed by: STUDENT IN AN ORGANIZED HEALTH CARE EDUCATION/TRAINING PROGRAM

## 2021-05-28 PROCEDURE — 80202 ASSAY OF VANCOMYCIN: CPT | Performed by: INTERNAL MEDICINE

## 2021-05-28 PROCEDURE — 85384 FIBRINOGEN ACTIVITY: CPT | Performed by: EMERGENCY MEDICINE

## 2021-05-28 PROCEDURE — 20000000 HC ICU ROOM

## 2021-05-28 PROCEDURE — 99900035 HC TECH TIME PER 15 MIN (STAT)

## 2021-05-28 PROCEDURE — 80053 COMPREHEN METABOLIC PANEL: CPT | Performed by: STUDENT IN AN ORGANIZED HEALTH CARE EDUCATION/TRAINING PROGRAM

## 2021-05-28 PROCEDURE — 97161 PT EVAL LOW COMPLEX 20 MIN: CPT

## 2021-05-28 PROCEDURE — 99233 SBSQ HOSP IP/OBS HIGH 50: CPT | Mod: ,,, | Performed by: INTERNAL MEDICINE

## 2021-05-28 PROCEDURE — 84100 ASSAY OF PHOSPHORUS: CPT | Performed by: STUDENT IN AN ORGANIZED HEALTH CARE EDUCATION/TRAINING PROGRAM

## 2021-05-28 PROCEDURE — 97535 SELF CARE MNGMENT TRAINING: CPT

## 2021-05-28 PROCEDURE — 27000221 HC OXYGEN, UP TO 24 HOURS

## 2021-05-28 PROCEDURE — 63600175 PHARM REV CODE 636 W HCPCS: Performed by: STUDENT IN AN ORGANIZED HEALTH CARE EDUCATION/TRAINING PROGRAM

## 2021-05-28 PROCEDURE — 85025 COMPLETE CBC W/AUTO DIFF WBC: CPT | Performed by: STUDENT IN AN ORGANIZED HEALTH CARE EDUCATION/TRAINING PROGRAM

## 2021-05-28 PROCEDURE — 63600175 PHARM REV CODE 636 W HCPCS: Performed by: INTERNAL MEDICINE

## 2021-05-28 PROCEDURE — 99233 PR SUBSEQUENT HOSPITAL CARE,LEVL III: ICD-10-PCS | Mod: ,,, | Performed by: INTERNAL MEDICINE

## 2021-05-28 PROCEDURE — 97530 THERAPEUTIC ACTIVITIES: CPT

## 2021-05-28 PROCEDURE — 94761 N-INVAS EAR/PLS OXIMETRY MLT: CPT

## 2021-05-28 RX ORDER — PANTOPRAZOLE SODIUM 40 MG/1
40 TABLET, DELAYED RELEASE ORAL DAILY
Status: DISCONTINUED | OUTPATIENT
Start: 2021-05-28 | End: 2021-06-16 | Stop reason: HOSPADM

## 2021-05-28 RX ORDER — INSULIN ASPART 100 [IU]/ML
2 INJECTION, SOLUTION INTRAVENOUS; SUBCUTANEOUS
Status: DISCONTINUED | OUTPATIENT
Start: 2021-05-28 | End: 2021-05-31

## 2021-05-28 RX ADMIN — HYDROCODONE BITARTRATE AND ACETAMINOPHEN 1 TABLET: 5; 325 TABLET ORAL at 10:05

## 2021-05-28 RX ADMIN — DOCUSATE SODIUM 50 MG: 50 CAPSULE, LIQUID FILLED ORAL at 08:05

## 2021-05-28 RX ADMIN — FUROSEMIDE 40 MG: 10 INJECTION, SOLUTION INTRAMUSCULAR; INTRAVENOUS at 08:05

## 2021-05-28 RX ADMIN — PANTOPRAZOLE SODIUM 40 MG: 40 TABLET, DELAYED RELEASE ORAL at 08:05

## 2021-05-28 RX ADMIN — HYDROCODONE BITARTRATE AND ACETAMINOPHEN 1 TABLET: 5; 325 TABLET ORAL at 06:05

## 2021-05-28 RX ADMIN — VANCOMYCIN HYDROCHLORIDE 1000 MG: 1 INJECTION, POWDER, LYOPHILIZED, FOR SOLUTION INTRAVENOUS at 10:05

## 2021-05-28 RX ADMIN — INSULIN ASPART 4 UNITS: 100 INJECTION, SOLUTION INTRAVENOUS; SUBCUTANEOUS at 12:05

## 2021-05-28 RX ADMIN — INSULIN ASPART 3 UNITS: 100 INJECTION, SOLUTION INTRAVENOUS; SUBCUTANEOUS at 06:05

## 2021-05-28 RX ADMIN — INSULIN ASPART 2 UNITS: 100 INJECTION, SOLUTION INTRAVENOUS; SUBCUTANEOUS at 04:05

## 2021-05-28 RX ADMIN — INSULIN ASPART 3 UNITS: 100 INJECTION, SOLUTION INTRAVENOUS; SUBCUTANEOUS at 04:05

## 2021-05-28 RX ADMIN — SODIUM CHLORIDE 5 ML/HR: 0.9 INJECTION, SOLUTION INTRAVENOUS at 10:05

## 2021-05-28 RX ADMIN — POLYETHYLENE GLYCOL 3350 17 G: 17 POWDER, FOR SOLUTION ORAL at 08:05

## 2021-05-28 RX ADMIN — NOREPINEPHRINE BITARTRATE 0.25 MCG/KG/MIN: 1 INJECTION, SOLUTION, CONCENTRATE INTRAVENOUS at 02:05

## 2021-05-28 RX ADMIN — INSULIN ASPART 2 UNITS: 100 INJECTION, SOLUTION INTRAVENOUS; SUBCUTANEOUS at 12:05

## 2021-05-28 RX ADMIN — INSULIN ASPART 1 UNITS: 100 INJECTION, SOLUTION INTRAVENOUS; SUBCUTANEOUS at 10:05

## 2021-05-28 RX ADMIN — VASOPRESSIN 0.04 UNITS/MIN: 20 INJECTION INTRAVENOUS at 01:05

## 2021-05-28 RX ADMIN — VASOPRESSIN 0.04 UNITS/MIN: 20 INJECTION INTRAVENOUS at 06:05

## 2021-05-28 RX ADMIN — VASOPRESSIN 0.04 UNITS/MIN: 20 INJECTION INTRAVENOUS at 11:05

## 2021-05-28 RX ADMIN — MUPIROCIN: 20 OINTMENT TOPICAL at 08:05

## 2021-05-29 PROBLEM — E87.5 HYPERKALEMIA: Status: RESOLVED | Noted: 2021-05-23 | Resolved: 2021-05-29

## 2021-05-29 PROBLEM — I48.11 LONGSTANDING PERSISTENT ATRIAL FIBRILLATION: Status: ACTIVE | Noted: 2021-05-02

## 2021-05-29 PROBLEM — K31.811: Status: ACTIVE | Noted: 2021-05-29

## 2021-05-29 PROBLEM — N17.9 ACUTE RENAL FAILURE SUPERIMPOSED ON STAGE 3 CHRONIC KIDNEY DISEASE: Status: RESOLVED | Noted: 2021-05-23 | Resolved: 2021-05-29

## 2021-05-29 PROBLEM — I50.22 CHRONIC SYSTOLIC HEART FAILURE: Status: ACTIVE | Noted: 2021-05-29

## 2021-05-29 PROBLEM — A41.1 STAPHYLOCOCCUS EPIDERMIDIS SEPSIS: Status: ACTIVE | Noted: 2021-05-24

## 2021-05-29 PROBLEM — N18.30 ACUTE RENAL FAILURE SUPERIMPOSED ON STAGE 3 CHRONIC KIDNEY DISEASE: Status: RESOLVED | Noted: 2021-05-23 | Resolved: 2021-05-29

## 2021-05-29 PROBLEM — I48.0 PAROXYSMAL ATRIAL FIBRILLATION: Status: ACTIVE | Noted: 2021-05-02

## 2021-05-29 PROBLEM — N17.9 ACUTE RENAL FAILURE: Status: RESOLVED | Noted: 2021-05-23 | Resolved: 2021-05-29

## 2021-05-29 PROBLEM — I38 VALVULAR HEART DISEASE: Status: ACTIVE | Noted: 2021-05-29

## 2021-05-29 PROBLEM — D68.9 COAGULOPATHY: Status: RESOLVED | Noted: 2021-05-23 | Resolved: 2021-05-29

## 2021-05-29 LAB
ALBUMIN SERPL BCP-MCNC: 2.6 G/DL (ref 3.5–5.2)
ALLENS TEST: ABNORMAL
ALLENS TEST: ABNORMAL
ALP SERPL-CCNC: 105 U/L (ref 55–135)
ALT SERPL W/O P-5'-P-CCNC: 15 U/L (ref 10–44)
ANION GAP SERPL CALC-SCNC: 15 MMOL/L (ref 8–16)
ANION GAP SERPL CALC-SCNC: 15 MMOL/L (ref 8–16)
APTT BLDCRRT: 29.9 SEC (ref 21–32)
APTT BLDCRRT: 53.9 SEC (ref 21–32)
AST SERPL-CCNC: 18 U/L (ref 10–40)
BASOPHILS # BLD AUTO: 0.03 K/UL (ref 0–0.2)
BASOPHILS # BLD AUTO: 0.05 K/UL (ref 0–0.2)
BASOPHILS # BLD AUTO: 0.06 K/UL (ref 0–0.2)
BASOPHILS # BLD AUTO: 0.06 K/UL (ref 0–0.2)
BASOPHILS NFR BLD: 0.2 % (ref 0–1.9)
BASOPHILS NFR BLD: 0.4 % (ref 0–1.9)
BASOPHILS NFR BLD: 0.4 % (ref 0–1.9)
BASOPHILS NFR BLD: 0.5 % (ref 0–1.9)
BILIRUB SERPL-MCNC: 1 MG/DL (ref 0.1–1)
BUN SERPL-MCNC: 51 MG/DL (ref 8–23)
BUN SERPL-MCNC: 51 MG/DL (ref 8–23)
CALCIUM SERPL-MCNC: 8.9 MG/DL (ref 8.7–10.5)
CALCIUM SERPL-MCNC: 8.9 MG/DL (ref 8.7–10.5)
CHLORIDE SERPL-SCNC: 92 MMOL/L (ref 95–110)
CHLORIDE SERPL-SCNC: 92 MMOL/L (ref 95–110)
CO2 SERPL-SCNC: 20 MMOL/L (ref 23–29)
CO2 SERPL-SCNC: 20 MMOL/L (ref 23–29)
CREAT SERPL-MCNC: 1.3 MG/DL (ref 0.5–1.4)
CREAT SERPL-MCNC: 1.3 MG/DL (ref 0.5–1.4)
DELSYS: ABNORMAL
DIFFERENTIAL METHOD: ABNORMAL
EOSINOPHIL # BLD AUTO: 0.3 K/UL (ref 0–0.5)
EOSINOPHIL # BLD AUTO: 0.3 K/UL (ref 0–0.5)
EOSINOPHIL # BLD AUTO: 0.4 K/UL (ref 0–0.5)
EOSINOPHIL # BLD AUTO: 0.4 K/UL (ref 0–0.5)
EOSINOPHIL NFR BLD: 2 % (ref 0–8)
EOSINOPHIL NFR BLD: 2.3 % (ref 0–8)
EOSINOPHIL NFR BLD: 2.6 % (ref 0–8)
EOSINOPHIL NFR BLD: 2.9 % (ref 0–8)
ERYTHROCYTE [DISTWIDTH] IN BLOOD BY AUTOMATED COUNT: 16.3 % (ref 11.5–14.5)
ERYTHROCYTE [DISTWIDTH] IN BLOOD BY AUTOMATED COUNT: 16.7 % (ref 11.5–14.5)
EST. GFR  (AFRICAN AMERICAN): >60 ML/MIN/1.73 M^2
EST. GFR  (AFRICAN AMERICAN): >60 ML/MIN/1.73 M^2
EST. GFR  (NON AFRICAN AMERICAN): 54.1 ML/MIN/1.73 M^2
EST. GFR  (NON AFRICAN AMERICAN): 54.1 ML/MIN/1.73 M^2
FIBRINOGEN PPP-MCNC: 541 MG/DL (ref 182–400)
GLUCOSE SERPL-MCNC: 230 MG/DL (ref 70–110)
GLUCOSE SERPL-MCNC: 230 MG/DL (ref 70–110)
HCO3 UR-SCNC: 26.3 MMOL/L (ref 24–28)
HCO3 UR-SCNC: 26.5 MMOL/L (ref 24–28)
HCT VFR BLD AUTO: 19.6 % (ref 40–54)
HCT VFR BLD AUTO: 20.8 % (ref 40–54)
HCT VFR BLD AUTO: 21.6 % (ref 40–54)
HCT VFR BLD AUTO: 21.7 % (ref 40–54)
HGB BLD-MCNC: 6.6 G/DL (ref 14–18)
HGB BLD-MCNC: 7.1 G/DL (ref 14–18)
HGB BLD-MCNC: 7.1 G/DL (ref 14–18)
HGB BLD-MCNC: 7.3 G/DL (ref 14–18)
IMM GRANULOCYTES # BLD AUTO: 0.16 K/UL (ref 0–0.04)
IMM GRANULOCYTES # BLD AUTO: 0.17 K/UL (ref 0–0.04)
IMM GRANULOCYTES # BLD AUTO: 0.19 K/UL (ref 0–0.04)
IMM GRANULOCYTES # BLD AUTO: 0.21 K/UL (ref 0–0.04)
IMM GRANULOCYTES NFR BLD AUTO: 1.3 % (ref 0–0.5)
IMM GRANULOCYTES NFR BLD AUTO: 1.3 % (ref 0–0.5)
IMM GRANULOCYTES NFR BLD AUTO: 1.4 % (ref 0–0.5)
IMM GRANULOCYTES NFR BLD AUTO: 1.5 % (ref 0–0.5)
INR PPP: 1.1 (ref 0.8–1.2)
INR PPP: 1.2 (ref 0.8–1.2)
INR PPP: 1.2 (ref 0.8–1.2)
LYMPHOCYTES # BLD AUTO: 1.4 K/UL (ref 1–4.8)
LYMPHOCYTES # BLD AUTO: 1.5 K/UL (ref 1–4.8)
LYMPHOCYTES # BLD AUTO: 1.9 K/UL (ref 1–4.8)
LYMPHOCYTES # BLD AUTO: 2 K/UL (ref 1–4.8)
LYMPHOCYTES NFR BLD: 10.6 % (ref 18–48)
LYMPHOCYTES NFR BLD: 12 % (ref 18–48)
LYMPHOCYTES NFR BLD: 13.9 % (ref 18–48)
LYMPHOCYTES NFR BLD: 13.9 % (ref 18–48)
MAGNESIUM SERPL-MCNC: 1.5 MG/DL (ref 1.6–2.6)
MCH RBC QN AUTO: 27.6 PG (ref 27–31)
MCH RBC QN AUTO: 28.3 PG (ref 27–31)
MCH RBC QN AUTO: 28.6 PG (ref 27–31)
MCH RBC QN AUTO: 28.7 PG (ref 27–31)
MCHC RBC AUTO-ENTMCNC: 32.9 G/DL (ref 32–36)
MCHC RBC AUTO-ENTMCNC: 33.6 G/DL (ref 32–36)
MCHC RBC AUTO-ENTMCNC: 33.7 G/DL (ref 32–36)
MCHC RBC AUTO-ENTMCNC: 34.1 G/DL (ref 32–36)
MCV RBC AUTO: 83 FL (ref 82–98)
MCV RBC AUTO: 84 FL (ref 82–98)
MCV RBC AUTO: 85 FL (ref 82–98)
MCV RBC AUTO: 85 FL (ref 82–98)
MONOCYTES # BLD AUTO: 0.9 K/UL (ref 0.3–1)
MONOCYTES # BLD AUTO: 0.9 K/UL (ref 0.3–1)
MONOCYTES # BLD AUTO: 1 K/UL (ref 0.3–1)
MONOCYTES # BLD AUTO: 1.1 K/UL (ref 0.3–1)
MONOCYTES NFR BLD: 7 % (ref 4–15)
MONOCYTES NFR BLD: 7.1 % (ref 4–15)
MONOCYTES NFR BLD: 7.2 % (ref 4–15)
MONOCYTES NFR BLD: 7.6 % (ref 4–15)
NEUTROPHILS # BLD AUTO: 10 K/UL (ref 1.8–7.7)
NEUTROPHILS # BLD AUTO: 10.2 K/UL (ref 1.8–7.7)
NEUTROPHILS # BLD AUTO: 10.6 K/UL (ref 1.8–7.7)
NEUTROPHILS # BLD AUTO: 9.5 K/UL (ref 1.8–7.7)
NEUTROPHILS NFR BLD: 74 % (ref 38–73)
NEUTROPHILS NFR BLD: 75.2 % (ref 38–73)
NEUTROPHILS NFR BLD: 76.6 % (ref 38–73)
NEUTROPHILS NFR BLD: 78.1 % (ref 38–73)
NRBC BLD-RTO: 0 /100 WBC
PCO2 BLDA: 37.2 MMHG (ref 35–45)
PCO2 BLDA: 38.1 MMHG (ref 35–45)
PH SMN: 7.45 [PH] (ref 7.35–7.45)
PH SMN: 7.46 [PH] (ref 7.35–7.45)
PHOSPHATE SERPL-MCNC: 3.3 MG/DL (ref 2.7–4.5)
PLATELET # BLD AUTO: 177 K/UL (ref 150–450)
PLATELET # BLD AUTO: 187 K/UL (ref 150–450)
PLATELET # BLD AUTO: 189 K/UL (ref 150–450)
PLATELET # BLD AUTO: 201 K/UL (ref 150–450)
PMV BLD AUTO: 9.6 FL (ref 9.2–12.9)
PMV BLD AUTO: 9.7 FL (ref 9.2–12.9)
PO2 BLDA: 25 MMHG (ref 40–60)
PO2 BLDA: 28 MMHG (ref 40–60)
POC BE: 2 MMOL/L
POC BE: 3 MMOL/L
POC SATURATED O2: 48 % (ref 95–100)
POC SATURATED O2: 58 % (ref 95–100)
POC TCO2: 27 MMOL/L (ref 24–29)
POC TCO2: 28 MMOL/L (ref 24–29)
POCT GLUCOSE: 203 MG/DL (ref 70–110)
POCT GLUCOSE: 245 MG/DL (ref 70–110)
POCT GLUCOSE: 253 MG/DL (ref 70–110)
POCT GLUCOSE: 264 MG/DL (ref 70–110)
POCT GLUCOSE: 275 MG/DL (ref 70–110)
POTASSIUM SERPL-SCNC: 4.1 MMOL/L (ref 3.5–5.1)
POTASSIUM SERPL-SCNC: 4.1 MMOL/L (ref 3.5–5.1)
PROT SERPL-MCNC: 6.5 G/DL (ref 6–8.4)
PROTHROMBIN TIME: 12.2 SEC (ref 9–12.5)
PROTHROMBIN TIME: 12.3 SEC (ref 9–12.5)
PROTHROMBIN TIME: 12.4 SEC (ref 9–12.5)
PROTHROMBIN TIME: 12.9 SEC (ref 9–12.5)
PROTHROMBIN TIME: 13 SEC (ref 9–12.5)
RBC # BLD AUTO: 2.31 M/UL (ref 4.6–6.2)
RBC # BLD AUTO: 2.51 M/UL (ref 4.6–6.2)
RBC # BLD AUTO: 2.54 M/UL (ref 4.6–6.2)
RBC # BLD AUTO: 2.57 M/UL (ref 4.6–6.2)
SAMPLE: ABNORMAL
SAMPLE: ABNORMAL
SITE: ABNORMAL
SITE: ABNORMAL
SODIUM SERPL-SCNC: 127 MMOL/L (ref 136–145)
SODIUM SERPL-SCNC: 127 MMOL/L (ref 136–145)
WBC # BLD AUTO: 12.45 K/UL (ref 3.9–12.7)
WBC # BLD AUTO: 12.77 K/UL (ref 3.9–12.7)
WBC # BLD AUTO: 13.6 K/UL (ref 3.9–12.7)
WBC # BLD AUTO: 14.28 K/UL (ref 3.9–12.7)

## 2021-05-29 PROCEDURE — 25000003 PHARM REV CODE 250: Performed by: STUDENT IN AN ORGANIZED HEALTH CARE EDUCATION/TRAINING PROGRAM

## 2021-05-29 PROCEDURE — 63600175 PHARM REV CODE 636 W HCPCS: Performed by: STUDENT IN AN ORGANIZED HEALTH CARE EDUCATION/TRAINING PROGRAM

## 2021-05-29 PROCEDURE — 99233 PR SUBSEQUENT HOSPITAL CARE,LEVL III: ICD-10-PCS | Mod: ,,, | Performed by: INTERNAL MEDICINE

## 2021-05-29 PROCEDURE — 25000003 PHARM REV CODE 250: Performed by: INTERNAL MEDICINE

## 2021-05-29 PROCEDURE — 99233 SBSQ HOSP IP/OBS HIGH 50: CPT | Mod: ,,, | Performed by: INTERNAL MEDICINE

## 2021-05-29 PROCEDURE — 85610 PROTHROMBIN TIME: CPT | Mod: 91 | Performed by: STUDENT IN AN ORGANIZED HEALTH CARE EDUCATION/TRAINING PROGRAM

## 2021-05-29 PROCEDURE — 94761 N-INVAS EAR/PLS OXIMETRY MLT: CPT

## 2021-05-29 PROCEDURE — 85730 THROMBOPLASTIN TIME PARTIAL: CPT | Mod: 91 | Performed by: INTERNAL MEDICINE

## 2021-05-29 PROCEDURE — 99900035 HC TECH TIME PER 15 MIN (STAT)

## 2021-05-29 PROCEDURE — 80053 COMPREHEN METABOLIC PANEL: CPT | Performed by: STUDENT IN AN ORGANIZED HEALTH CARE EDUCATION/TRAINING PROGRAM

## 2021-05-29 PROCEDURE — 93010 EKG 12-LEAD: ICD-10-PCS | Mod: ,,, | Performed by: INTERNAL MEDICINE

## 2021-05-29 PROCEDURE — 86920 COMPATIBILITY TEST SPIN: CPT | Performed by: STUDENT IN AN ORGANIZED HEALTH CARE EDUCATION/TRAINING PROGRAM

## 2021-05-29 PROCEDURE — 63600175 PHARM REV CODE 636 W HCPCS: Performed by: INTERNAL MEDICINE

## 2021-05-29 PROCEDURE — 82803 BLOOD GASES ANY COMBINATION: CPT

## 2021-05-29 PROCEDURE — 20000000 HC ICU ROOM

## 2021-05-29 PROCEDURE — 85730 THROMBOPLASTIN TIME PARTIAL: CPT | Performed by: STUDENT IN AN ORGANIZED HEALTH CARE EDUCATION/TRAINING PROGRAM

## 2021-05-29 PROCEDURE — 84100 ASSAY OF PHOSPHORUS: CPT | Performed by: STUDENT IN AN ORGANIZED HEALTH CARE EDUCATION/TRAINING PROGRAM

## 2021-05-29 PROCEDURE — 25000003 PHARM REV CODE 250

## 2021-05-29 PROCEDURE — 85025 COMPLETE CBC W/AUTO DIFF WBC: CPT | Performed by: INTERNAL MEDICINE

## 2021-05-29 PROCEDURE — 93010 ELECTROCARDIOGRAM REPORT: CPT | Mod: ,,, | Performed by: INTERNAL MEDICINE

## 2021-05-29 PROCEDURE — 85384 FIBRINOGEN ACTIVITY: CPT | Performed by: EMERGENCY MEDICINE

## 2021-05-29 PROCEDURE — 85025 COMPLETE CBC W/AUTO DIFF WBC: CPT | Mod: 91 | Performed by: STUDENT IN AN ORGANIZED HEALTH CARE EDUCATION/TRAINING PROGRAM

## 2021-05-29 PROCEDURE — 27000221 HC OXYGEN, UP TO 24 HOURS

## 2021-05-29 PROCEDURE — 93005 ELECTROCARDIOGRAM TRACING: CPT

## 2021-05-29 PROCEDURE — 86900 BLOOD TYPING SEROLOGIC ABO: CPT | Performed by: INTERNAL MEDICINE

## 2021-05-29 PROCEDURE — 86920 COMPATIBILITY TEST SPIN: CPT | Performed by: INTERNAL MEDICINE

## 2021-05-29 PROCEDURE — 83735 ASSAY OF MAGNESIUM: CPT | Performed by: STUDENT IN AN ORGANIZED HEALTH CARE EDUCATION/TRAINING PROGRAM

## 2021-05-29 RX ORDER — HEPARIN SODIUM,PORCINE/D5W 25000/250
0-40 INTRAVENOUS SOLUTION INTRAVENOUS CONTINUOUS
Status: DISCONTINUED | OUTPATIENT
Start: 2021-05-29 | End: 2021-05-31

## 2021-05-29 RX ORDER — MAGNESIUM SULFATE HEPTAHYDRATE 40 MG/ML
2 INJECTION, SOLUTION INTRAVENOUS ONCE
Status: COMPLETED | OUTPATIENT
Start: 2021-05-29 | End: 2021-05-29

## 2021-05-29 RX ORDER — HYDROCODONE BITARTRATE AND ACETAMINOPHEN 500; 5 MG/1; MG/1
TABLET ORAL
Status: DISCONTINUED | OUTPATIENT
Start: 2021-05-29 | End: 2021-06-02

## 2021-05-29 RX ORDER — HEPARIN SODIUM,PORCINE/D5W 25000/250
0-40 INTRAVENOUS SOLUTION INTRAVENOUS CONTINUOUS
Status: DISCONTINUED | OUTPATIENT
Start: 2021-05-29 | End: 2021-05-29

## 2021-05-29 RX ORDER — VASOPRESSIN 20 [USP'U]/ML
INJECTION, SOLUTION INTRAMUSCULAR; SUBCUTANEOUS
Status: COMPLETED
Start: 2021-05-29 | End: 2021-05-29

## 2021-05-29 RX ADMIN — VASOPRESSIN 0.04 UNITS/MIN: 20 INJECTION INTRAVENOUS at 09:05

## 2021-05-29 RX ADMIN — FUROSEMIDE 40 MG: 10 INJECTION, SOLUTION INTRAMUSCULAR; INTRAVENOUS at 09:05

## 2021-05-29 RX ADMIN — INSULIN ASPART 1 UNITS: 100 INJECTION, SOLUTION INTRAVENOUS; SUBCUTANEOUS at 09:05

## 2021-05-29 RX ADMIN — INSULIN ASPART 3 UNITS: 100 INJECTION, SOLUTION INTRAVENOUS; SUBCUTANEOUS at 07:05

## 2021-05-29 RX ADMIN — HYDROCODONE BITARTRATE AND ACETAMINOPHEN 1 TABLET: 5; 325 TABLET ORAL at 12:05

## 2021-05-29 RX ADMIN — VASOPRESSIN 20 UNITS: 20 INJECTION INTRAVENOUS at 07:05

## 2021-05-29 RX ADMIN — INSULIN ASPART 2 UNITS: 100 INJECTION, SOLUTION INTRAVENOUS; SUBCUTANEOUS at 11:05

## 2021-05-29 RX ADMIN — INSULIN ASPART 3 UNITS: 100 INJECTION, SOLUTION INTRAVENOUS; SUBCUTANEOUS at 05:05

## 2021-05-29 RX ADMIN — VANCOMYCIN HYDROCHLORIDE 1000 MG: 1 INJECTION, POWDER, LYOPHILIZED, FOR SOLUTION INTRAVENOUS at 09:05

## 2021-05-29 RX ADMIN — HEPARIN SODIUM AND DEXTROSE 12 UNITS/KG/HR: 10000; 5 INJECTION INTRAVENOUS at 02:05

## 2021-05-29 RX ADMIN — INSULIN ASPART 2 UNITS: 100 INJECTION, SOLUTION INTRAVENOUS; SUBCUTANEOUS at 05:05

## 2021-05-29 RX ADMIN — HYDROCODONE BITARTRATE AND ACETAMINOPHEN 1 TABLET: 5; 325 TABLET ORAL at 07:05

## 2021-05-29 RX ADMIN — PANTOPRAZOLE SODIUM 40 MG: 40 TABLET, DELAYED RELEASE ORAL at 09:05

## 2021-05-29 RX ADMIN — MAGNESIUM SULFATE 2 G: 2 INJECTION INTRAVENOUS at 06:05

## 2021-05-29 RX ADMIN — NOREPINEPHRINE BITARTRATE 0.22 MCG/KG/MIN: 1 INJECTION, SOLUTION, CONCENTRATE INTRAVENOUS at 04:05

## 2021-05-29 RX ADMIN — HYDROCODONE BITARTRATE AND ACETAMINOPHEN 1 TABLET: 5; 325 TABLET ORAL at 06:05

## 2021-05-29 RX ADMIN — DOCUSATE SODIUM 50 MG: 50 CAPSULE, LIQUID FILLED ORAL at 09:05

## 2021-05-29 RX ADMIN — POLYETHYLENE GLYCOL 3350 17 G: 17 POWDER, FOR SOLUTION ORAL at 09:05

## 2021-05-29 RX ADMIN — INSULIN ASPART 2 UNITS: 100 INJECTION, SOLUTION INTRAVENOUS; SUBCUTANEOUS at 07:05

## 2021-05-30 LAB
ABO + RH BLD: NORMAL
ALBUMIN SERPL BCP-MCNC: 2.4 G/DL (ref 3.5–5.2)
ALLENS TEST: ABNORMAL
ALP SERPL-CCNC: 100 U/L (ref 55–135)
ALT SERPL W/O P-5'-P-CCNC: 14 U/L (ref 10–44)
ANION GAP SERPL CALC-SCNC: 10 MMOL/L (ref 8–16)
ANION GAP SERPL CALC-SCNC: 10 MMOL/L (ref 8–16)
APTT BLDCRRT: 45.9 SEC (ref 21–32)
APTT BLDCRRT: 51.3 SEC (ref 21–32)
APTT BLDCRRT: 60.3 SEC (ref 21–32)
AST SERPL-CCNC: 18 U/L (ref 10–40)
BASOPHILS # BLD AUTO: 0.03 K/UL (ref 0–0.2)
BASOPHILS # BLD AUTO: 0.04 K/UL (ref 0–0.2)
BASOPHILS # BLD AUTO: 0.04 K/UL (ref 0–0.2)
BASOPHILS NFR BLD: 0.2 % (ref 0–1.9)
BASOPHILS NFR BLD: 0.4 % (ref 0–1.9)
BASOPHILS NFR BLD: 0.4 % (ref 0–1.9)
BILIRUB SERPL-MCNC: 1 MG/DL (ref 0.1–1)
BLD GP AB SCN CELLS X3 SERPL QL: NORMAL
BLD PROD TYP BPU: NORMAL
BLOOD UNIT EXPIRATION DATE: NORMAL
BLOOD UNIT TYPE CODE: 5100
BLOOD UNIT TYPE: NORMAL
BUN SERPL-MCNC: 39 MG/DL (ref 8–23)
BUN SERPL-MCNC: 39 MG/DL (ref 8–23)
CALCIUM SERPL-MCNC: 8.4 MG/DL (ref 8.7–10.5)
CALCIUM SERPL-MCNC: 8.4 MG/DL (ref 8.7–10.5)
CHLORIDE SERPL-SCNC: 90 MMOL/L (ref 95–110)
CHLORIDE SERPL-SCNC: 90 MMOL/L (ref 95–110)
CO2 SERPL-SCNC: 24 MMOL/L (ref 23–29)
CO2 SERPL-SCNC: 24 MMOL/L (ref 23–29)
CODING SYSTEM: NORMAL
CREAT SERPL-MCNC: 1.1 MG/DL (ref 0.5–1.4)
CREAT SERPL-MCNC: 1.1 MG/DL (ref 0.5–1.4)
CREAT UR-MCNC: 83 MG/DL (ref 23–375)
DELSYS: ABNORMAL
DIFFERENTIAL METHOD: ABNORMAL
DISPENSE STATUS: NORMAL
EOSINOPHIL # BLD AUTO: 0.2 K/UL (ref 0–0.5)
EOSINOPHIL # BLD AUTO: 0.3 K/UL (ref 0–0.5)
EOSINOPHIL # BLD AUTO: 0.3 K/UL (ref 0–0.5)
EOSINOPHIL NFR BLD: 1.7 % (ref 0–8)
EOSINOPHIL NFR BLD: 2.5 % (ref 0–8)
EOSINOPHIL NFR BLD: 2.6 % (ref 0–8)
ERYTHROCYTE [DISTWIDTH] IN BLOOD BY AUTOMATED COUNT: 15.9 % (ref 11.5–14.5)
ERYTHROCYTE [DISTWIDTH] IN BLOOD BY AUTOMATED COUNT: 16 % (ref 11.5–14.5)
ERYTHROCYTE [DISTWIDTH] IN BLOOD BY AUTOMATED COUNT: 16 % (ref 11.5–14.5)
EST. GFR  (AFRICAN AMERICAN): >60 ML/MIN/1.73 M^2
EST. GFR  (AFRICAN AMERICAN): >60 ML/MIN/1.73 M^2
EST. GFR  (NON AFRICAN AMERICAN): >60 ML/MIN/1.73 M^2
EST. GFR  (NON AFRICAN AMERICAN): >60 ML/MIN/1.73 M^2
FIBRINOGEN PPP-MCNC: 506 MG/DL (ref 182–400)
GLUCOSE SERPL-MCNC: 206 MG/DL (ref 70–110)
GLUCOSE SERPL-MCNC: 206 MG/DL (ref 70–110)
HCO3 UR-SCNC: 26.5 MMOL/L (ref 24–28)
HCT VFR BLD AUTO: 21.7 % (ref 40–54)
HCT VFR BLD AUTO: 22.2 % (ref 40–54)
HCT VFR BLD AUTO: 22.7 % (ref 40–54)
HGB BLD-MCNC: 7.3 G/DL (ref 14–18)
HGB BLD-MCNC: 7.4 G/DL (ref 14–18)
HGB BLD-MCNC: 7.8 G/DL (ref 14–18)
IMM GRANULOCYTES # BLD AUTO: 0.14 K/UL (ref 0–0.04)
IMM GRANULOCYTES # BLD AUTO: 0.14 K/UL (ref 0–0.04)
IMM GRANULOCYTES # BLD AUTO: 0.16 K/UL (ref 0–0.04)
IMM GRANULOCYTES NFR BLD AUTO: 1.2 % (ref 0–0.5)
IMM GRANULOCYTES NFR BLD AUTO: 1.3 % (ref 0–0.5)
IMM GRANULOCYTES NFR BLD AUTO: 1.3 % (ref 0–0.5)
INR PPP: 1.2 (ref 0.8–1.2)
LYMPHOCYTES # BLD AUTO: 1.3 K/UL (ref 1–4.8)
LYMPHOCYTES # BLD AUTO: 1.5 K/UL (ref 1–4.8)
LYMPHOCYTES # BLD AUTO: 1.7 K/UL (ref 1–4.8)
LYMPHOCYTES NFR BLD: 11.4 % (ref 18–48)
LYMPHOCYTES NFR BLD: 13.1 % (ref 18–48)
LYMPHOCYTES NFR BLD: 13.6 % (ref 18–48)
MAGNESIUM SERPL-MCNC: 1.7 MG/DL (ref 1.6–2.6)
MCH RBC QN AUTO: 27.9 PG (ref 27–31)
MCH RBC QN AUTO: 28.1 PG (ref 27–31)
MCH RBC QN AUTO: 28.7 PG (ref 27–31)
MCHC RBC AUTO-ENTMCNC: 33.3 G/DL (ref 32–36)
MCHC RBC AUTO-ENTMCNC: 33.6 G/DL (ref 32–36)
MCHC RBC AUTO-ENTMCNC: 34.4 G/DL (ref 32–36)
MCV RBC AUTO: 82 FL (ref 82–98)
MCV RBC AUTO: 84 FL (ref 82–98)
MCV RBC AUTO: 85 FL (ref 82–98)
MONOCYTES # BLD AUTO: 0.7 K/UL (ref 0.3–1)
MONOCYTES # BLD AUTO: 0.8 K/UL (ref 0.3–1)
MONOCYTES # BLD AUTO: 0.8 K/UL (ref 0.3–1)
MONOCYTES NFR BLD: 5.9 % (ref 4–15)
MONOCYTES NFR BLD: 6.4 % (ref 4–15)
MONOCYTES NFR BLD: 7.4 % (ref 4–15)
NEUTROPHILS # BLD AUTO: 8.5 K/UL (ref 1.8–7.7)
NEUTROPHILS # BLD AUTO: 8.7 K/UL (ref 1.8–7.7)
NEUTROPHILS # BLD AUTO: 9.7 K/UL (ref 1.8–7.7)
NEUTROPHILS NFR BLD: 74.8 % (ref 38–73)
NEUTROPHILS NFR BLD: 76.5 % (ref 38–73)
NEUTROPHILS NFR BLD: 79.3 % (ref 38–73)
NRBC BLD-RTO: 0 /100 WBC
NUM UNITS TRANS PACKED RBC: NORMAL
OSMOLALITY UR: 500 MOSM/KG (ref 50–1200)
PCO2 BLDA: 38.8 MMHG (ref 35–45)
PH SMN: 7.44 [PH] (ref 7.35–7.45)
PHOSPHATE SERPL-MCNC: 3 MG/DL (ref 2.7–4.5)
PLATELET # BLD AUTO: 174 K/UL (ref 150–450)
PLATELET # BLD AUTO: 186 K/UL (ref 150–450)
PLATELET # BLD AUTO: 193 K/UL (ref 150–450)
PMV BLD AUTO: 10 FL (ref 9.2–12.9)
PMV BLD AUTO: 9.7 FL (ref 9.2–12.9)
PMV BLD AUTO: 9.7 FL (ref 9.2–12.9)
PO2 BLDA: 26 MMHG (ref 40–60)
POC BE: 2 MMOL/L
POC SATURATED O2: 50 % (ref 95–100)
POC TCO2: 28 MMOL/L (ref 24–29)
POCT GLUCOSE: 171 MG/DL (ref 70–110)
POCT GLUCOSE: 195 MG/DL (ref 70–110)
POCT GLUCOSE: 197 MG/DL (ref 70–110)
POCT GLUCOSE: 223 MG/DL (ref 70–110)
POCT GLUCOSE: 292 MG/DL (ref 70–110)
POTASSIUM SERPL-SCNC: 4 MMOL/L (ref 3.5–5.1)
POTASSIUM SERPL-SCNC: 4 MMOL/L (ref 3.5–5.1)
PROT SERPL-MCNC: 6.1 G/DL (ref 6–8.4)
PROTHROMBIN TIME: 12.7 SEC (ref 9–12.5)
PROTHROMBIN TIME: 13 SEC (ref 9–12.5)
PROTHROMBIN TIME: 13.2 SEC (ref 9–12.5)
RBC # BLD AUTO: 2.54 M/UL (ref 4.6–6.2)
RBC # BLD AUTO: 2.65 M/UL (ref 4.6–6.2)
RBC # BLD AUTO: 2.78 M/UL (ref 4.6–6.2)
SAMPLE: ABNORMAL
SITE: ABNORMAL
SODIUM SERPL-SCNC: 124 MMOL/L (ref 136–145)
SODIUM SERPL-SCNC: 124 MMOL/L (ref 136–145)
SODIUM UR-SCNC: 27 MMOL/L (ref 20–250)
UUN UR-MCNC: 798 MG/DL (ref 140–1050)
VANCOMYCIN TROUGH SERPL-MCNC: 15.2 UG/ML (ref 10–22)
WBC # BLD AUTO: 11 K/UL (ref 3.9–12.7)
WBC # BLD AUTO: 11.36 K/UL (ref 3.9–12.7)
WBC # BLD AUTO: 12.62 K/UL (ref 3.9–12.7)

## 2021-05-30 PROCEDURE — P9016 RBC LEUKOCYTES REDUCED: HCPCS | Performed by: INTERNAL MEDICINE

## 2021-05-30 PROCEDURE — 63600175 PHARM REV CODE 636 W HCPCS: Performed by: STUDENT IN AN ORGANIZED HEALTH CARE EDUCATION/TRAINING PROGRAM

## 2021-05-30 PROCEDURE — 85730 THROMBOPLASTIN TIME PARTIAL: CPT | Mod: 91 | Performed by: INTERNAL MEDICINE

## 2021-05-30 PROCEDURE — 25000003 PHARM REV CODE 250: Performed by: STUDENT IN AN ORGANIZED HEALTH CARE EDUCATION/TRAINING PROGRAM

## 2021-05-30 PROCEDURE — 85610 PROTHROMBIN TIME: CPT | Performed by: STUDENT IN AN ORGANIZED HEALTH CARE EDUCATION/TRAINING PROGRAM

## 2021-05-30 PROCEDURE — 25000003 PHARM REV CODE 250: Performed by: INTERNAL MEDICINE

## 2021-05-30 PROCEDURE — 99900035 HC TECH TIME PER 15 MIN (STAT)

## 2021-05-30 PROCEDURE — 94761 N-INVAS EAR/PLS OXIMETRY MLT: CPT

## 2021-05-30 PROCEDURE — 85730 THROMBOPLASTIN TIME PARTIAL: CPT | Performed by: STUDENT IN AN ORGANIZED HEALTH CARE EDUCATION/TRAINING PROGRAM

## 2021-05-30 PROCEDURE — 63600175 PHARM REV CODE 636 W HCPCS: Performed by: INTERNAL MEDICINE

## 2021-05-30 PROCEDURE — 85025 COMPLETE CBC W/AUTO DIFF WBC: CPT | Mod: 91 | Performed by: STUDENT IN AN ORGANIZED HEALTH CARE EDUCATION/TRAINING PROGRAM

## 2021-05-30 PROCEDURE — 82570 ASSAY OF URINE CREATININE: CPT | Performed by: STUDENT IN AN ORGANIZED HEALTH CARE EDUCATION/TRAINING PROGRAM

## 2021-05-30 PROCEDURE — 84100 ASSAY OF PHOSPHORUS: CPT | Performed by: STUDENT IN AN ORGANIZED HEALTH CARE EDUCATION/TRAINING PROGRAM

## 2021-05-30 PROCEDURE — 85025 COMPLETE CBC W/AUTO DIFF WBC: CPT | Performed by: STUDENT IN AN ORGANIZED HEALTH CARE EDUCATION/TRAINING PROGRAM

## 2021-05-30 PROCEDURE — 83935 ASSAY OF URINE OSMOLALITY: CPT | Performed by: STUDENT IN AN ORGANIZED HEALTH CARE EDUCATION/TRAINING PROGRAM

## 2021-05-30 PROCEDURE — 99233 SBSQ HOSP IP/OBS HIGH 50: CPT | Mod: ,,, | Performed by: INTERNAL MEDICINE

## 2021-05-30 PROCEDURE — 84540 ASSAY OF URINE/UREA-N: CPT | Performed by: STUDENT IN AN ORGANIZED HEALTH CARE EDUCATION/TRAINING PROGRAM

## 2021-05-30 PROCEDURE — 20000000 HC ICU ROOM

## 2021-05-30 PROCEDURE — 85384 FIBRINOGEN ACTIVITY: CPT | Performed by: STUDENT IN AN ORGANIZED HEALTH CARE EDUCATION/TRAINING PROGRAM

## 2021-05-30 PROCEDURE — 84300 ASSAY OF URINE SODIUM: CPT | Performed by: STUDENT IN AN ORGANIZED HEALTH CARE EDUCATION/TRAINING PROGRAM

## 2021-05-30 PROCEDURE — 80202 ASSAY OF VANCOMYCIN: CPT | Performed by: INTERNAL MEDICINE

## 2021-05-30 PROCEDURE — 80053 COMPREHEN METABOLIC PANEL: CPT | Performed by: STUDENT IN AN ORGANIZED HEALTH CARE EDUCATION/TRAINING PROGRAM

## 2021-05-30 PROCEDURE — 99233 PR SUBSEQUENT HOSPITAL CARE,LEVL III: ICD-10-PCS | Mod: ,,, | Performed by: INTERNAL MEDICINE

## 2021-05-30 PROCEDURE — 83735 ASSAY OF MAGNESIUM: CPT | Performed by: STUDENT IN AN ORGANIZED HEALTH CARE EDUCATION/TRAINING PROGRAM

## 2021-05-30 RX ORDER — MAGNESIUM SULFATE HEPTAHYDRATE 40 MG/ML
2 INJECTION, SOLUTION INTRAVENOUS ONCE
Status: COMPLETED | OUTPATIENT
Start: 2021-05-30 | End: 2021-05-30

## 2021-05-30 RX ADMIN — VASOPRESSIN 0.04 UNITS/MIN: 20 INJECTION INTRAVENOUS at 03:05

## 2021-05-30 RX ADMIN — DOCUSATE SODIUM 50 MG: 50 CAPSULE, LIQUID FILLED ORAL at 08:05

## 2021-05-30 RX ADMIN — MAGNESIUM SULFATE 2 G: 2 INJECTION INTRAVENOUS at 06:05

## 2021-05-30 RX ADMIN — VASOPRESSIN 0.04 UNITS/MIN: 20 INJECTION INTRAVENOUS at 06:05

## 2021-05-30 RX ADMIN — INSULIN ASPART 2 UNITS: 100 INJECTION, SOLUTION INTRAVENOUS; SUBCUTANEOUS at 05:05

## 2021-05-30 RX ADMIN — POLYETHYLENE GLYCOL 3350 17 G: 17 POWDER, FOR SOLUTION ORAL at 08:05

## 2021-05-30 RX ADMIN — INSULIN ASPART 2 UNITS: 100 INJECTION, SOLUTION INTRAVENOUS; SUBCUTANEOUS at 10:05

## 2021-05-30 RX ADMIN — PANTOPRAZOLE SODIUM 40 MG: 40 TABLET, DELAYED RELEASE ORAL at 08:05

## 2021-05-30 RX ADMIN — VASOPRESSIN 0.04 UNITS/MIN: 20 INJECTION INTRAVENOUS at 10:05

## 2021-05-30 RX ADMIN — VANCOMYCIN HYDROCHLORIDE 1000 MG: 1 INJECTION, POWDER, LYOPHILIZED, FOR SOLUTION INTRAVENOUS at 09:05

## 2021-05-30 RX ADMIN — INSULIN ASPART 1 UNITS: 100 INJECTION, SOLUTION INTRAVENOUS; SUBCUTANEOUS at 09:05

## 2021-05-30 RX ADMIN — HEPARIN SODIUM AND DEXTROSE 10 UNITS/KG/HR: 10000; 5 INJECTION INTRAVENOUS at 08:05

## 2021-05-30 RX ADMIN — HYDROCODONE BITARTRATE AND ACETAMINOPHEN 1 TABLET: 5; 325 TABLET ORAL at 09:05

## 2021-05-30 RX ADMIN — INSULIN ASPART 2 UNITS: 100 INJECTION, SOLUTION INTRAVENOUS; SUBCUTANEOUS at 07:05

## 2021-05-31 LAB
ALBUMIN SERPL BCP-MCNC: 2.4 G/DL (ref 3.5–5.2)
ALLENS TEST: ABNORMAL
ALP SERPL-CCNC: 105 U/L (ref 55–135)
ALT SERPL W/O P-5'-P-CCNC: 13 U/L (ref 10–44)
ANION GAP SERPL CALC-SCNC: 10 MMOL/L (ref 8–16)
ANION GAP SERPL CALC-SCNC: 11 MMOL/L (ref 8–16)
APTT BLDCRRT: 47.8 SEC (ref 21–32)
AST SERPL-CCNC: 19 U/L (ref 10–40)
BACTERIA BLD CULT: NORMAL
BACTERIA BLD CULT: NORMAL
BASOPHILS # BLD AUTO: 0.04 K/UL (ref 0–0.2)
BASOPHILS # BLD AUTO: 0.04 K/UL (ref 0–0.2)
BASOPHILS # BLD AUTO: 0.05 K/UL (ref 0–0.2)
BASOPHILS NFR BLD: 0.4 % (ref 0–1.9)
BILIRUB SERPL-MCNC: 0.9 MG/DL (ref 0.1–1)
BLD PROD TYP BPU: NORMAL
BLOOD UNIT EXPIRATION DATE: NORMAL
BLOOD UNIT TYPE CODE: 5100
BLOOD UNIT TYPE: NORMAL
BUN SERPL-MCNC: 27 MG/DL (ref 8–23)
BUN SERPL-MCNC: 27 MG/DL (ref 8–23)
BUN SERPL-MCNC: 30 MG/DL (ref 8–23)
BUN SERPL-MCNC: 30 MG/DL (ref 8–23)
CALCIUM SERPL-MCNC: 8.1 MG/DL (ref 8.7–10.5)
CALCIUM SERPL-MCNC: 8.3 MG/DL (ref 8.7–10.5)
CHLORIDE SERPL-SCNC: 89 MMOL/L (ref 95–110)
CHLORIDE SERPL-SCNC: 90 MMOL/L (ref 95–110)
CHLORIDE SERPL-SCNC: 90 MMOL/L (ref 95–110)
CHLORIDE SERPL-SCNC: 91 MMOL/L (ref 95–110)
CO2 SERPL-SCNC: 20 MMOL/L (ref 23–29)
CO2 SERPL-SCNC: 20 MMOL/L (ref 23–29)
CO2 SERPL-SCNC: 21 MMOL/L (ref 23–29)
CO2 SERPL-SCNC: 21 MMOL/L (ref 23–29)
CODING SYSTEM: NORMAL
CREAT SERPL-MCNC: 1.1 MG/DL (ref 0.5–1.4)
CREAT UR-MCNC: 104 MG/DL (ref 23–375)
DELSYS: ABNORMAL
DIFFERENTIAL METHOD: ABNORMAL
DISPENSE STATUS: NORMAL
EOSINOPHIL # BLD AUTO: 0.2 K/UL (ref 0–0.5)
EOSINOPHIL # BLD AUTO: 0.2 K/UL (ref 0–0.5)
EOSINOPHIL # BLD AUTO: 0.3 K/UL (ref 0–0.5)
EOSINOPHIL NFR BLD: 2.1 % (ref 0–8)
EOSINOPHIL NFR BLD: 2.4 % (ref 0–8)
EOSINOPHIL NFR BLD: 2.5 % (ref 0–8)
ERYTHROCYTE [DISTWIDTH] IN BLOOD BY AUTOMATED COUNT: 15.7 % (ref 11.5–14.5)
ERYTHROCYTE [DISTWIDTH] IN BLOOD BY AUTOMATED COUNT: 15.9 % (ref 11.5–14.5)
ERYTHROCYTE [DISTWIDTH] IN BLOOD BY AUTOMATED COUNT: 15.9 % (ref 11.5–14.5)
EST. GFR  (AFRICAN AMERICAN): >60 ML/MIN/1.73 M^2
EST. GFR  (NON AFRICAN AMERICAN): >60 ML/MIN/1.73 M^2
FIBRINOGEN PPP-MCNC: 491 MG/DL (ref 182–400)
GLUCOSE SERPL-MCNC: 153 MG/DL (ref 70–110)
GLUCOSE SERPL-MCNC: 172 MG/DL (ref 70–110)
GLUCOSE SERPL-MCNC: 172 MG/DL (ref 70–110)
GLUCOSE SERPL-MCNC: 186 MG/DL (ref 70–110)
HCO3 UR-SCNC: 24.2 MMOL/L (ref 24–28)
HCT VFR BLD AUTO: 21.9 % (ref 40–54)
HCT VFR BLD AUTO: 22.1 % (ref 40–54)
HCT VFR BLD AUTO: 23.9 % (ref 40–54)
HGB BLD-MCNC: 7.3 G/DL (ref 14–18)
HGB BLD-MCNC: 7.4 G/DL (ref 14–18)
HGB BLD-MCNC: 8.1 G/DL (ref 14–18)
IMM GRANULOCYTES # BLD AUTO: 0.09 K/UL (ref 0–0.04)
IMM GRANULOCYTES # BLD AUTO: 0.11 K/UL (ref 0–0.04)
IMM GRANULOCYTES # BLD AUTO: 0.13 K/UL (ref 0–0.04)
IMM GRANULOCYTES NFR BLD AUTO: 0.8 % (ref 0–0.5)
IMM GRANULOCYTES NFR BLD AUTO: 1.1 % (ref 0–0.5)
IMM GRANULOCYTES NFR BLD AUTO: 1.2 % (ref 0–0.5)
INR PPP: 1.2 (ref 0.8–1.2)
LYMPHOCYTES # BLD AUTO: 1.2 K/UL (ref 1–4.8)
LYMPHOCYTES # BLD AUTO: 1.9 K/UL (ref 1–4.8)
LYMPHOCYTES # BLD AUTO: 2.1 K/UL (ref 1–4.8)
LYMPHOCYTES NFR BLD: 13.6 % (ref 18–48)
LYMPHOCYTES NFR BLD: 17.1 % (ref 18–48)
LYMPHOCYTES NFR BLD: 18.4 % (ref 18–48)
MAGNESIUM SERPL-MCNC: 2 MG/DL (ref 1.6–2.6)
MAGNESIUM SERPL-MCNC: 2.2 MG/DL (ref 1.6–2.6)
MCH RBC QN AUTO: 27.4 PG (ref 27–31)
MCH RBC QN AUTO: 28 PG (ref 27–31)
MCH RBC QN AUTO: 28.3 PG (ref 27–31)
MCHC RBC AUTO-ENTMCNC: 33.3 G/DL (ref 32–36)
MCHC RBC AUTO-ENTMCNC: 33.5 G/DL (ref 32–36)
MCHC RBC AUTO-ENTMCNC: 33.9 G/DL (ref 32–36)
MCV RBC AUTO: 82 FL (ref 82–98)
MCV RBC AUTO: 84 FL (ref 82–98)
MCV RBC AUTO: 84 FL (ref 82–98)
MONOCYTES # BLD AUTO: 0.6 K/UL (ref 0.3–1)
MONOCYTES # BLD AUTO: 0.7 K/UL (ref 0.3–1)
MONOCYTES # BLD AUTO: 0.7 K/UL (ref 0.3–1)
MONOCYTES NFR BLD: 6.3 % (ref 4–15)
MONOCYTES NFR BLD: 6.3 % (ref 4–15)
MONOCYTES NFR BLD: 6.5 % (ref 4–15)
NEUTROPHILS # BLD AUTO: 6.9 K/UL (ref 1.8–7.7)
NEUTROPHILS # BLD AUTO: 8.1 K/UL (ref 1.8–7.7)
NEUTROPHILS # BLD AUTO: 8.2 K/UL (ref 1.8–7.7)
NEUTROPHILS NFR BLD: 71.4 % (ref 38–73)
NEUTROPHILS NFR BLD: 73.3 % (ref 38–73)
NEUTROPHILS NFR BLD: 75.8 % (ref 38–73)
NRBC BLD-RTO: 0 /100 WBC
OSMOLALITY SERPL: 267 MOSM/KG (ref 280–300)
OSMOLALITY UR: 515 MOSM/KG (ref 50–1200)
PCO2 BLDA: 35.5 MMHG (ref 35–45)
PH SMN: 7.44 [PH] (ref 7.35–7.45)
PLATELET # BLD AUTO: 174 K/UL (ref 150–450)
PLATELET # BLD AUTO: 201 K/UL (ref 150–450)
PLATELET # BLD AUTO: 221 K/UL (ref 150–450)
PMV BLD AUTO: 10 FL (ref 9.2–12.9)
PMV BLD AUTO: 10.8 FL (ref 9.2–12.9)
PMV BLD AUTO: 9.9 FL (ref 9.2–12.9)
PO2 BLDA: 26 MMHG (ref 40–60)
POC BE: 0 MMOL/L
POC SATURATED O2: 52 % (ref 95–100)
POC TCO2: 25 MMOL/L (ref 24–29)
POCT GLUCOSE: 187 MG/DL (ref 70–110)
POCT GLUCOSE: 195 MG/DL (ref 70–110)
POCT GLUCOSE: 198 MG/DL (ref 70–110)
POCT GLUCOSE: 199 MG/DL (ref 70–110)
POTASSIUM SERPL-SCNC: 3.6 MMOL/L (ref 3.5–5.1)
POTASSIUM SERPL-SCNC: 3.7 MMOL/L (ref 3.5–5.1)
POTASSIUM SERPL-SCNC: 3.8 MMOL/L (ref 3.5–5.1)
POTASSIUM SERPL-SCNC: 3.8 MMOL/L (ref 3.5–5.1)
PROT SERPL-MCNC: 6 G/DL (ref 6–8.4)
PROTHROMBIN TIME: 13 SEC (ref 9–12.5)
PROTHROMBIN TIME: 13.1 SEC (ref 9–12.5)
PROTHROMBIN TIME: 13.2 SEC (ref 9–12.5)
PROTHROMBIN TIME: 13.3 SEC (ref 9–12.5)
RBC # BLD AUTO: 2.64 M/UL (ref 4.6–6.2)
RBC # BLD AUTO: 2.66 M/UL (ref 4.6–6.2)
RBC # BLD AUTO: 2.86 M/UL (ref 4.6–6.2)
SAMPLE: ABNORMAL
SITE: ABNORMAL
SODIUM SERPL-SCNC: 120 MMOL/L (ref 136–145)
SODIUM SERPL-SCNC: 121 MMOL/L (ref 136–145)
SODIUM SERPL-SCNC: 122 MMOL/L (ref 136–145)
SODIUM SERPL-SCNC: 122 MMOL/L (ref 136–145)
SODIUM UR-SCNC: 20 MMOL/L (ref 20–250)
TRANS ERYTHROCYTES VOL PATIENT: NORMAL ML
WBC # BLD AUTO: 11.08 K/UL (ref 3.9–12.7)
WBC # BLD AUTO: 11.42 K/UL (ref 3.9–12.7)
WBC # BLD AUTO: 9.09 K/UL (ref 3.9–12.7)

## 2021-05-31 PROCEDURE — 97535 SELF CARE MNGMENT TRAINING: CPT

## 2021-05-31 PROCEDURE — 85025 COMPLETE CBC W/AUTO DIFF WBC: CPT | Mod: 91 | Performed by: STUDENT IN AN ORGANIZED HEALTH CARE EDUCATION/TRAINING PROGRAM

## 2021-05-31 PROCEDURE — 25000003 PHARM REV CODE 250: Performed by: STUDENT IN AN ORGANIZED HEALTH CARE EDUCATION/TRAINING PROGRAM

## 2021-05-31 PROCEDURE — 80053 COMPREHEN METABOLIC PANEL: CPT | Performed by: STUDENT IN AN ORGANIZED HEALTH CARE EDUCATION/TRAINING PROGRAM

## 2021-05-31 PROCEDURE — 99900035 HC TECH TIME PER 15 MIN (STAT)

## 2021-05-31 PROCEDURE — 84300 ASSAY OF URINE SODIUM: CPT | Performed by: STUDENT IN AN ORGANIZED HEALTH CARE EDUCATION/TRAINING PROGRAM

## 2021-05-31 PROCEDURE — 85384 FIBRINOGEN ACTIVITY: CPT | Performed by: EMERGENCY MEDICINE

## 2021-05-31 PROCEDURE — 92610 EVALUATE SWALLOWING FUNCTION: CPT

## 2021-05-31 PROCEDURE — P9021 RED BLOOD CELLS UNIT: HCPCS | Performed by: STUDENT IN AN ORGANIZED HEALTH CARE EDUCATION/TRAINING PROGRAM

## 2021-05-31 PROCEDURE — 99233 SBSQ HOSP IP/OBS HIGH 50: CPT | Mod: ,,, | Performed by: INTERNAL MEDICINE

## 2021-05-31 PROCEDURE — 99233 PR SUBSEQUENT HOSPITAL CARE,LEVL III: ICD-10-PCS | Mod: ,,, | Performed by: INTERNAL MEDICINE

## 2021-05-31 PROCEDURE — 63600175 PHARM REV CODE 636 W HCPCS: Performed by: INTERNAL MEDICINE

## 2021-05-31 PROCEDURE — 85025 COMPLETE CBC W/AUTO DIFF WBC: CPT | Performed by: STUDENT IN AN ORGANIZED HEALTH CARE EDUCATION/TRAINING PROGRAM

## 2021-05-31 PROCEDURE — 25000003 PHARM REV CODE 250: Performed by: INTERNAL MEDICINE

## 2021-05-31 PROCEDURE — 83735 ASSAY OF MAGNESIUM: CPT | Mod: 91 | Performed by: STUDENT IN AN ORGANIZED HEALTH CARE EDUCATION/TRAINING PROGRAM

## 2021-05-31 PROCEDURE — 83935 ASSAY OF URINE OSMOLALITY: CPT | Performed by: STUDENT IN AN ORGANIZED HEALTH CARE EDUCATION/TRAINING PROGRAM

## 2021-05-31 PROCEDURE — 80048 BASIC METABOLIC PNL TOTAL CA: CPT | Performed by: STUDENT IN AN ORGANIZED HEALTH CARE EDUCATION/TRAINING PROGRAM

## 2021-05-31 PROCEDURE — 85730 THROMBOPLASTIN TIME PARTIAL: CPT | Performed by: INTERNAL MEDICINE

## 2021-05-31 PROCEDURE — 82570 ASSAY OF URINE CREATININE: CPT | Performed by: STUDENT IN AN ORGANIZED HEALTH CARE EDUCATION/TRAINING PROGRAM

## 2021-05-31 PROCEDURE — 83735 ASSAY OF MAGNESIUM: CPT | Performed by: STUDENT IN AN ORGANIZED HEALTH CARE EDUCATION/TRAINING PROGRAM

## 2021-05-31 PROCEDURE — 85610 PROTHROMBIN TIME: CPT | Mod: 91 | Performed by: STUDENT IN AN ORGANIZED HEALTH CARE EDUCATION/TRAINING PROGRAM

## 2021-05-31 PROCEDURE — 83930 ASSAY OF BLOOD OSMOLALITY: CPT | Performed by: STUDENT IN AN ORGANIZED HEALTH CARE EDUCATION/TRAINING PROGRAM

## 2021-05-31 PROCEDURE — 63600175 PHARM REV CODE 636 W HCPCS: Performed by: STUDENT IN AN ORGANIZED HEALTH CARE EDUCATION/TRAINING PROGRAM

## 2021-05-31 PROCEDURE — 20000000 HC ICU ROOM

## 2021-05-31 RX ORDER — MAGNESIUM SULFATE HEPTAHYDRATE 40 MG/ML
2 INJECTION, SOLUTION INTRAVENOUS ONCE
Status: COMPLETED | OUTPATIENT
Start: 2021-05-31 | End: 2021-05-31

## 2021-05-31 RX ORDER — HYDROCODONE BITARTRATE AND ACETAMINOPHEN 500; 5 MG/1; MG/1
TABLET ORAL
Status: DISCONTINUED | OUTPATIENT
Start: 2021-05-31 | End: 2021-06-02

## 2021-05-31 RX ORDER — DOBUTAMINE HYDROCHLORIDE 400 MG/100ML
INJECTION INTRAVENOUS
Status: DISPENSED
Start: 2021-05-31 | End: 2021-06-01

## 2021-05-31 RX ORDER — INSULIN ASPART 100 [IU]/ML
2 INJECTION, SOLUTION INTRAVENOUS; SUBCUTANEOUS
Status: DISCONTINUED | OUTPATIENT
Start: 2021-05-31 | End: 2021-06-08

## 2021-05-31 RX ORDER — FUROSEMIDE 10 MG/ML
40 INJECTION INTRAMUSCULAR; INTRAVENOUS
Status: DISCONTINUED | OUTPATIENT
Start: 2021-05-31 | End: 2021-05-31

## 2021-05-31 RX ORDER — POTASSIUM CHLORIDE 29.8 MG/ML
40 INJECTION INTRAVENOUS ONCE
Status: COMPLETED | OUTPATIENT
Start: 2021-05-31 | End: 2021-06-01

## 2021-05-31 RX ORDER — ASPIRIN 81 MG/1
81 TABLET ORAL DAILY
Status: DISCONTINUED | OUTPATIENT
Start: 2021-06-01 | End: 2021-06-16 | Stop reason: HOSPADM

## 2021-05-31 RX ORDER — FUROSEMIDE 10 MG/ML
40 INJECTION INTRAMUSCULAR; INTRAVENOUS 2 TIMES DAILY
Status: DISCONTINUED | OUTPATIENT
Start: 2021-05-31 | End: 2021-06-01

## 2021-05-31 RX ADMIN — NOREPINEPHRINE BITARTRATE 0.05 MCG/KG/MIN: 1 INJECTION, SOLUTION, CONCENTRATE INTRAVENOUS at 07:05

## 2021-05-31 RX ADMIN — FUROSEMIDE 40 MG: 10 INJECTION, SOLUTION INTRAMUSCULAR; INTRAVENOUS at 02:05

## 2021-05-31 RX ADMIN — MAGNESIUM SULFATE 2 G: 2 INJECTION INTRAVENOUS at 05:05

## 2021-05-31 RX ADMIN — INSULIN ASPART 2 UNITS: 100 INJECTION, SOLUTION INTRAVENOUS; SUBCUTANEOUS at 04:05

## 2021-05-31 RX ADMIN — VASOPRESSIN 0.04 UNITS/MIN: 20 INJECTION INTRAVENOUS at 08:05

## 2021-05-31 RX ADMIN — POLYETHYLENE GLYCOL 3350 17 G: 17 POWDER, FOR SOLUTION ORAL at 08:05

## 2021-05-31 RX ADMIN — INSULIN ASPART 2 UNITS: 100 INJECTION, SOLUTION INTRAVENOUS; SUBCUTANEOUS at 07:05

## 2021-05-31 RX ADMIN — VANCOMYCIN HYDROCHLORIDE 1000 MG: 1 INJECTION, POWDER, LYOPHILIZED, FOR SOLUTION INTRAVENOUS at 08:05

## 2021-05-31 RX ADMIN — FUROSEMIDE 40 MG: 10 INJECTION, SOLUTION INTRAMUSCULAR; INTRAVENOUS at 08:05

## 2021-05-31 RX ADMIN — POTASSIUM CHLORIDE 40 MEQ: 400 INJECTION, SOLUTION INTRAVENOUS at 10:05

## 2021-05-31 RX ADMIN — HYDROCODONE BITARTRATE AND ACETAMINOPHEN 1 TABLET: 5; 325 TABLET ORAL at 08:05

## 2021-05-31 RX ADMIN — PANTOPRAZOLE SODIUM 40 MG: 40 TABLET, DELAYED RELEASE ORAL at 08:05

## 2021-05-31 RX ADMIN — VASOPRESSIN 0.04 UNITS/MIN: 20 INJECTION INTRAVENOUS at 03:05

## 2021-05-31 RX ADMIN — DOCUSATE SODIUM 50 MG: 50 CAPSULE, LIQUID FILLED ORAL at 08:05

## 2021-06-01 LAB
ALBUMIN SERPL BCP-MCNC: 2.4 G/DL (ref 3.5–5.2)
ALLENS TEST: ABNORMAL
ALP SERPL-CCNC: 124 U/L (ref 55–135)
ALT SERPL W/O P-5'-P-CCNC: 13 U/L (ref 10–44)
ANION GAP SERPL CALC-SCNC: 10 MMOL/L (ref 8–16)
ANION GAP SERPL CALC-SCNC: 10 MMOL/L (ref 8–16)
ANION GAP SERPL CALC-SCNC: 11 MMOL/L (ref 8–16)
APTT BLDCRRT: 27.3 SEC (ref 21–32)
APTT BLDCRRT: 27.3 SEC (ref 21–32)
AST SERPL-CCNC: 19 U/L (ref 10–40)
BASOPHILS # BLD AUTO: 0.03 K/UL (ref 0–0.2)
BASOPHILS # BLD AUTO: 0.04 K/UL (ref 0–0.2)
BASOPHILS # BLD AUTO: 0.04 K/UL (ref 0–0.2)
BASOPHILS NFR BLD: 0.2 % (ref 0–1.9)
BASOPHILS NFR BLD: 0.4 % (ref 0–1.9)
BASOPHILS NFR BLD: 0.4 % (ref 0–1.9)
BILIRUB SERPL-MCNC: 0.8 MG/DL (ref 0.1–1)
BNP SERPL-MCNC: 1887 PG/ML (ref 0–99)
BUN SERPL-MCNC: 25 MG/DL (ref 8–23)
BUN SERPL-MCNC: 28 MG/DL (ref 8–23)
BUN SERPL-MCNC: 31 MG/DL (ref 8–23)
CALCIUM SERPL-MCNC: 8 MG/DL (ref 8.7–10.5)
CALCIUM SERPL-MCNC: 8.2 MG/DL (ref 8.7–10.5)
CALCIUM SERPL-MCNC: 8.2 MG/DL (ref 8.7–10.5)
CHLORIDE SERPL-SCNC: 88 MMOL/L (ref 95–110)
CHLORIDE SERPL-SCNC: 89 MMOL/L (ref 95–110)
CHLORIDE SERPL-SCNC: 91 MMOL/L (ref 95–110)
CO2 SERPL-SCNC: 18 MMOL/L (ref 23–29)
CO2 SERPL-SCNC: 20 MMOL/L (ref 23–29)
CO2 SERPL-SCNC: 20 MMOL/L (ref 23–29)
CREAT SERPL-MCNC: 1.1 MG/DL (ref 0.5–1.4)
CREAT SERPL-MCNC: 1.2 MG/DL (ref 0.5–1.4)
CREAT SERPL-MCNC: 1.5 MG/DL (ref 0.5–1.4)
DIFFERENTIAL METHOD: ABNORMAL
EOSINOPHIL # BLD AUTO: 0.2 K/UL (ref 0–0.5)
EOSINOPHIL # BLD AUTO: 0.2 K/UL (ref 0–0.5)
EOSINOPHIL # BLD AUTO: 0.3 K/UL (ref 0–0.5)
EOSINOPHIL NFR BLD: 1.8 % (ref 0–8)
EOSINOPHIL NFR BLD: 2.5 % (ref 0–8)
EOSINOPHIL NFR BLD: 2.9 % (ref 0–8)
ERYTHROCYTE [DISTWIDTH] IN BLOOD BY AUTOMATED COUNT: 15.7 % (ref 11.5–14.5)
EST. GFR  (AFRICAN AMERICAN): 52.6 ML/MIN/1.73 M^2
EST. GFR  (AFRICAN AMERICAN): >60 ML/MIN/1.73 M^2
EST. GFR  (AFRICAN AMERICAN): >60 ML/MIN/1.73 M^2
EST. GFR  (NON AFRICAN AMERICAN): 45.5 ML/MIN/1.73 M^2
EST. GFR  (NON AFRICAN AMERICAN): 59.6 ML/MIN/1.73 M^2
EST. GFR  (NON AFRICAN AMERICAN): >60 ML/MIN/1.73 M^2
FIBRINOGEN PPP-MCNC: 532 MG/DL (ref 182–400)
GLUCOSE SERPL-MCNC: 118 MG/DL (ref 70–110)
GLUCOSE SERPL-MCNC: 140 MG/DL (ref 70–110)
GLUCOSE SERPL-MCNC: 265 MG/DL (ref 70–110)
HCO3 UR-SCNC: 24.3 MMOL/L (ref 24–28)
HCT VFR BLD AUTO: 24.3 % (ref 40–54)
HCT VFR BLD AUTO: 24.9 % (ref 40–54)
HCT VFR BLD AUTO: 26.3 % (ref 40–54)
HGB BLD-MCNC: 8.3 G/DL (ref 14–18)
HGB BLD-MCNC: 8.3 G/DL (ref 14–18)
HGB BLD-MCNC: 9.2 G/DL (ref 14–18)
IMM GRANULOCYTES # BLD AUTO: 0.1 K/UL (ref 0–0.04)
IMM GRANULOCYTES # BLD AUTO: 0.11 K/UL (ref 0–0.04)
IMM GRANULOCYTES # BLD AUTO: 0.13 K/UL (ref 0–0.04)
IMM GRANULOCYTES NFR BLD AUTO: 1 % (ref 0–0.5)
IMM GRANULOCYTES NFR BLD AUTO: 1.1 % (ref 0–0.5)
IMM GRANULOCYTES NFR BLD AUTO: 1.2 % (ref 0–0.5)
INR PPP: 1.2 (ref 0.8–1.2)
INR PPP: 1.3 (ref 0.8–1.2)
LYMPHOCYTES # BLD AUTO: 1 K/UL (ref 1–4.8)
LYMPHOCYTES # BLD AUTO: 1.3 K/UL (ref 1–4.8)
LYMPHOCYTES # BLD AUTO: 1.5 K/UL (ref 1–4.8)
LYMPHOCYTES NFR BLD: 11.2 % (ref 18–48)
LYMPHOCYTES NFR BLD: 12.2 % (ref 18–48)
LYMPHOCYTES NFR BLD: 13.1 % (ref 18–48)
MAGNESIUM SERPL-MCNC: 1.8 MG/DL (ref 1.6–2.6)
MAGNESIUM SERPL-MCNC: 1.9 MG/DL (ref 1.6–2.6)
MCH RBC QN AUTO: 28.3 PG (ref 27–31)
MCH RBC QN AUTO: 28.6 PG (ref 27–31)
MCH RBC QN AUTO: 28.7 PG (ref 27–31)
MCHC RBC AUTO-ENTMCNC: 33.3 G/DL (ref 32–36)
MCHC RBC AUTO-ENTMCNC: 34.2 G/DL (ref 32–36)
MCHC RBC AUTO-ENTMCNC: 35 G/DL (ref 32–36)
MCV RBC AUTO: 82 FL (ref 82–98)
MCV RBC AUTO: 84 FL (ref 82–98)
MCV RBC AUTO: 85 FL (ref 82–98)
MONOCYTES # BLD AUTO: 0.5 K/UL (ref 0.3–1)
MONOCYTES # BLD AUTO: 0.6 K/UL (ref 0.3–1)
MONOCYTES # BLD AUTO: 0.8 K/UL (ref 0.3–1)
MONOCYTES NFR BLD: 5.2 % (ref 4–15)
MONOCYTES NFR BLD: 6.3 % (ref 4–15)
MONOCYTES NFR BLD: 6.3 % (ref 4–15)
NEUTROPHILS # BLD AUTO: 7.2 K/UL (ref 1.8–7.7)
NEUTROPHILS # BLD AUTO: 7.5 K/UL (ref 1.8–7.7)
NEUTROPHILS # BLD AUTO: 9.6 K/UL (ref 1.8–7.7)
NEUTROPHILS NFR BLD: 77.4 % (ref 38–73)
NEUTROPHILS NFR BLD: 78.4 % (ref 38–73)
NEUTROPHILS NFR BLD: 78.4 % (ref 38–73)
NRBC BLD-RTO: 0 /100 WBC
OSMOLALITY SERPL: 262 MOSM/KG (ref 280–300)
OSMOLALITY UR: 299 MOSM/KG (ref 50–1200)
PCO2 BLDA: 39.3 MMHG (ref 35–45)
PH SMN: 7.4 [PH] (ref 7.35–7.45)
PHOSPHATE SERPL-MCNC: 2.7 MG/DL (ref 2.7–4.5)
PHOSPHATE SERPL-MCNC: 2.7 MG/DL (ref 2.7–4.5)
PLATELET # BLD AUTO: 188 K/UL (ref 150–450)
PLATELET # BLD AUTO: 190 K/UL (ref 150–450)
PLATELET # BLD AUTO: 218 K/UL (ref 150–450)
PMV BLD AUTO: 10.3 FL (ref 9.2–12.9)
PMV BLD AUTO: 10.4 FL (ref 9.2–12.9)
PMV BLD AUTO: 10.5 FL (ref 9.2–12.9)
PO2 BLDA: 26 MMHG (ref 40–60)
POC BE: -1 MMOL/L
POC SATURATED O2: 48 % (ref 95–100)
POC TCO2: 25 MMOL/L (ref 24–29)
POCT GLUCOSE: 153 MG/DL (ref 70–110)
POCT GLUCOSE: 155 MG/DL (ref 70–110)
POCT GLUCOSE: 201 MG/DL (ref 70–110)
POCT GLUCOSE: 218 MG/DL (ref 70–110)
POTASSIUM SERPL-SCNC: 3.9 MMOL/L (ref 3.5–5.1)
POTASSIUM SERPL-SCNC: 4.4 MMOL/L (ref 3.5–5.1)
POTASSIUM SERPL-SCNC: 4.5 MMOL/L (ref 3.5–5.1)
POTASSIUM UR-SCNC: 35 MMOL/L (ref 15–95)
PROT SERPL-MCNC: 6 G/DL (ref 6–8.4)
PROTHROMBIN TIME: 13.2 SEC (ref 9–12.5)
PROTHROMBIN TIME: 13.2 SEC (ref 9–12.5)
PROTHROMBIN TIME: 13.4 SEC (ref 9–12.5)
PROTHROMBIN TIME: 14 SEC (ref 9–12.5)
RBC # BLD AUTO: 2.9 M/UL (ref 4.6–6.2)
RBC # BLD AUTO: 2.93 M/UL (ref 4.6–6.2)
RBC # BLD AUTO: 3.21 M/UL (ref 4.6–6.2)
SAMPLE: ABNORMAL
SITE: ABNORMAL
SODIUM SERPL-SCNC: 118 MMOL/L (ref 136–145)
SODIUM SERPL-SCNC: 119 MMOL/L (ref 136–145)
SODIUM SERPL-SCNC: 120 MMOL/L (ref 136–145)
SODIUM UR-SCNC: 51 MMOL/L (ref 20–250)
UUN UR-MCNC: 263 MG/DL (ref 140–1050)
WBC # BLD AUTO: 12.21 K/UL (ref 3.9–12.7)
WBC # BLD AUTO: 9.22 K/UL (ref 3.9–12.7)
WBC # BLD AUTO: 9.68 K/UL (ref 3.9–12.7)

## 2021-06-01 PROCEDURE — 85384 FIBRINOGEN ACTIVITY: CPT | Performed by: EMERGENCY MEDICINE

## 2021-06-01 PROCEDURE — 97110 THERAPEUTIC EXERCISES: CPT

## 2021-06-01 PROCEDURE — 85610 PROTHROMBIN TIME: CPT | Performed by: STUDENT IN AN ORGANIZED HEALTH CARE EDUCATION/TRAINING PROGRAM

## 2021-06-01 PROCEDURE — 99233 SBSQ HOSP IP/OBS HIGH 50: CPT | Mod: ,,, | Performed by: INTERNAL MEDICINE

## 2021-06-01 PROCEDURE — 83735 ASSAY OF MAGNESIUM: CPT | Performed by: STUDENT IN AN ORGANIZED HEALTH CARE EDUCATION/TRAINING PROGRAM

## 2021-06-01 PROCEDURE — 83930 ASSAY OF BLOOD OSMOLALITY: CPT | Performed by: INTERNAL MEDICINE

## 2021-06-01 PROCEDURE — 97530 THERAPEUTIC ACTIVITIES: CPT

## 2021-06-01 PROCEDURE — 99233 PR SUBSEQUENT HOSPITAL CARE,LEVL III: ICD-10-PCS | Mod: ,,, | Performed by: HOSPITALIST

## 2021-06-01 PROCEDURE — 99900035 HC TECH TIME PER 15 MIN (STAT)

## 2021-06-01 PROCEDURE — 99233 PR SUBSEQUENT HOSPITAL CARE,LEVL III: ICD-10-PCS | Mod: ,,, | Performed by: INTERNAL MEDICINE

## 2021-06-01 PROCEDURE — 20000000 HC ICU ROOM

## 2021-06-01 PROCEDURE — 85025 COMPLETE CBC W/AUTO DIFF WBC: CPT | Mod: 91 | Performed by: STUDENT IN AN ORGANIZED HEALTH CARE EDUCATION/TRAINING PROGRAM

## 2021-06-01 PROCEDURE — 80048 BASIC METABOLIC PNL TOTAL CA: CPT | Mod: 91 | Performed by: STUDENT IN AN ORGANIZED HEALTH CARE EDUCATION/TRAINING PROGRAM

## 2021-06-01 PROCEDURE — 99233 SBSQ HOSP IP/OBS HIGH 50: CPT | Mod: ,,, | Performed by: HOSPITALIST

## 2021-06-01 PROCEDURE — 83880 ASSAY OF NATRIURETIC PEPTIDE: CPT | Performed by: STUDENT IN AN ORGANIZED HEALTH CARE EDUCATION/TRAINING PROGRAM

## 2021-06-01 PROCEDURE — 83935 ASSAY OF URINE OSMOLALITY: CPT | Performed by: INTERNAL MEDICINE

## 2021-06-01 PROCEDURE — 80053 COMPREHEN METABOLIC PANEL: CPT | Performed by: STUDENT IN AN ORGANIZED HEALTH CARE EDUCATION/TRAINING PROGRAM

## 2021-06-01 PROCEDURE — 25000003 PHARM REV CODE 250: Performed by: STUDENT IN AN ORGANIZED HEALTH CARE EDUCATION/TRAINING PROGRAM

## 2021-06-01 PROCEDURE — 83735 ASSAY OF MAGNESIUM: CPT | Mod: 91 | Performed by: STUDENT IN AN ORGANIZED HEALTH CARE EDUCATION/TRAINING PROGRAM

## 2021-06-01 PROCEDURE — 85025 COMPLETE CBC W/AUTO DIFF WBC: CPT | Performed by: STUDENT IN AN ORGANIZED HEALTH CARE EDUCATION/TRAINING PROGRAM

## 2021-06-01 PROCEDURE — 84540 ASSAY OF URINE/UREA-N: CPT | Performed by: INTERNAL MEDICINE

## 2021-06-01 PROCEDURE — 84133 ASSAY OF URINE POTASSIUM: CPT | Performed by: INTERNAL MEDICINE

## 2021-06-01 PROCEDURE — 84300 ASSAY OF URINE SODIUM: CPT | Performed by: INTERNAL MEDICINE

## 2021-06-01 PROCEDURE — 63600175 PHARM REV CODE 636 W HCPCS: Performed by: STUDENT IN AN ORGANIZED HEALTH CARE EDUCATION/TRAINING PROGRAM

## 2021-06-01 PROCEDURE — 94761 N-INVAS EAR/PLS OXIMETRY MLT: CPT

## 2021-06-01 PROCEDURE — 25000003 PHARM REV CODE 250: Performed by: INTERNAL MEDICINE

## 2021-06-01 PROCEDURE — 84100 ASSAY OF PHOSPHORUS: CPT | Performed by: STUDENT IN AN ORGANIZED HEALTH CARE EDUCATION/TRAINING PROGRAM

## 2021-06-01 PROCEDURE — 85730 THROMBOPLASTIN TIME PARTIAL: CPT | Performed by: STUDENT IN AN ORGANIZED HEALTH CARE EDUCATION/TRAINING PROGRAM

## 2021-06-01 RX ORDER — VANCOMYCIN HCL IN 5 % DEXTROSE 1G/250ML
1000 PLASTIC BAG, INJECTION (ML) INTRAVENOUS
Status: DISCONTINUED | OUTPATIENT
Start: 2021-06-01 | End: 2021-06-07

## 2021-06-01 RX ORDER — FUROSEMIDE 10 MG/ML
120 INJECTION INTRAMUSCULAR; INTRAVENOUS 2 TIMES DAILY
Status: DISCONTINUED | OUTPATIENT
Start: 2021-06-01 | End: 2021-06-03

## 2021-06-01 RX ORDER — FUROSEMIDE 10 MG/ML
80 INJECTION INTRAMUSCULAR; INTRAVENOUS 2 TIMES DAILY
Status: DISCONTINUED | OUTPATIENT
Start: 2021-06-01 | End: 2021-06-01

## 2021-06-01 RX ORDER — FUROSEMIDE 10 MG/ML
120 INJECTION INTRAMUSCULAR; INTRAVENOUS ONCE
Status: COMPLETED | OUTPATIENT
Start: 2021-06-01 | End: 2021-06-01

## 2021-06-01 RX ADMIN — ASPIRIN 81 MG: 81 TABLET, COATED ORAL at 08:06

## 2021-06-01 RX ADMIN — FUROSEMIDE 120 MG: 10 INJECTION, SOLUTION INTRAMUSCULAR; INTRAVENOUS at 05:06

## 2021-06-01 RX ADMIN — VASOPRESSIN 0.04 UNITS/MIN: 20 INJECTION INTRAVENOUS at 08:06

## 2021-06-01 RX ADMIN — PANTOPRAZOLE SODIUM 40 MG: 40 TABLET, DELAYED RELEASE ORAL at 08:06

## 2021-06-01 RX ADMIN — INSULIN ASPART 2 UNITS: 100 INJECTION, SOLUTION INTRAVENOUS; SUBCUTANEOUS at 07:06

## 2021-06-01 RX ADMIN — VANCOMYCIN HYDROCHLORIDE 1000 MG: 1 INJECTION, POWDER, LYOPHILIZED, FOR SOLUTION INTRAVENOUS at 09:06

## 2021-06-01 RX ADMIN — INSULIN ASPART 2 UNITS: 100 INJECTION, SOLUTION INTRAVENOUS; SUBCUTANEOUS at 05:06

## 2021-06-01 RX ADMIN — FUROSEMIDE 80 MG: 10 INJECTION, SOLUTION INTRAMUSCULAR; INTRAVENOUS at 08:06

## 2021-06-01 RX ADMIN — HYDROCODONE BITARTRATE AND ACETAMINOPHEN 1 TABLET: 5; 325 TABLET ORAL at 05:06

## 2021-06-01 RX ADMIN — HYDROCODONE BITARTRATE AND ACETAMINOPHEN 1 TABLET: 5; 325 TABLET ORAL at 04:06

## 2021-06-01 RX ADMIN — INSULIN ASPART 2 UNITS: 100 INJECTION, SOLUTION INTRAVENOUS; SUBCUTANEOUS at 11:06

## 2021-06-01 RX ADMIN — FUROSEMIDE 120 MG: 10 INJECTION, SOLUTION INTRAMUSCULAR; INTRAVENOUS at 09:06

## 2021-06-01 RX ADMIN — POLYETHYLENE GLYCOL 3350 17 G: 17 POWDER, FOR SOLUTION ORAL at 08:06

## 2021-06-01 RX ADMIN — DOCUSATE SODIUM 50 MG: 50 CAPSULE, LIQUID FILLED ORAL at 08:06

## 2021-06-02 PROBLEM — I25.119 CORONARY ARTERY DISEASE INVOLVING NATIVE CORONARY ARTERY OF NATIVE HEART WITH ANGINA PECTORIS: Status: ACTIVE | Noted: 2020-01-15

## 2021-06-02 LAB
ABO + RH BLD: NORMAL
ALBUMIN SERPL BCP-MCNC: 2.5 G/DL (ref 3.5–5.2)
ALLENS TEST: ABNORMAL
ALP SERPL-CCNC: 135 U/L (ref 55–135)
ALT SERPL W/O P-5'-P-CCNC: 15 U/L (ref 10–44)
ANION GAP SERPL CALC-SCNC: 10 MMOL/L (ref 8–16)
ANION GAP SERPL CALC-SCNC: 11 MMOL/L (ref 8–16)
ANION GAP SERPL CALC-SCNC: 11 MMOL/L (ref 8–16)
ANION GAP SERPL CALC-SCNC: 12 MMOL/L (ref 8–16)
APTT BLDCRRT: 30.9 SEC (ref 21–32)
AST SERPL-CCNC: 23 U/L (ref 10–40)
BASOPHILS # BLD AUTO: 0.04 K/UL (ref 0–0.2)
BASOPHILS # BLD AUTO: 0.05 K/UL (ref 0–0.2)
BASOPHILS # BLD AUTO: 0.05 K/UL (ref 0–0.2)
BASOPHILS NFR BLD: 0.4 % (ref 0–1.9)
BASOPHILS NFR BLD: 0.4 % (ref 0–1.9)
BASOPHILS NFR BLD: 0.5 % (ref 0–1.9)
BILIRUB SERPL-MCNC: 0.9 MG/DL (ref 0.1–1)
BLD GP AB SCN CELLS X3 SERPL QL: NORMAL
BUN SERPL-MCNC: 28 MG/DL (ref 8–23)
BUN SERPL-MCNC: 29 MG/DL (ref 8–23)
BUN SERPL-MCNC: 30 MG/DL (ref 8–23)
BUN SERPL-MCNC: 30 MG/DL (ref 8–23)
CALCIUM SERPL-MCNC: 8.2 MG/DL (ref 8.7–10.5)
CALCIUM SERPL-MCNC: 8.2 MG/DL (ref 8.7–10.5)
CALCIUM SERPL-MCNC: 8.3 MG/DL (ref 8.7–10.5)
CALCIUM SERPL-MCNC: 8.3 MG/DL (ref 8.7–10.5)
CHLORIDE SERPL-SCNC: 90 MMOL/L (ref 95–110)
CHLORIDE SERPL-SCNC: 93 MMOL/L (ref 95–110)
CHLORIDE SERPL-SCNC: 95 MMOL/L (ref 95–110)
CHLORIDE SERPL-SCNC: 95 MMOL/L (ref 95–110)
CO2 SERPL-SCNC: 20 MMOL/L (ref 23–29)
CO2 SERPL-SCNC: 21 MMOL/L (ref 23–29)
CO2 SERPL-SCNC: 22 MMOL/L (ref 23–29)
CO2 SERPL-SCNC: 22 MMOL/L (ref 23–29)
CREAT SERPL-MCNC: 1.3 MG/DL (ref 0.5–1.4)
CREAT SERPL-MCNC: 1.3 MG/DL (ref 0.5–1.4)
CREAT SERPL-MCNC: 1.4 MG/DL (ref 0.5–1.4)
CREAT SERPL-MCNC: 1.4 MG/DL (ref 0.5–1.4)
DELSYS: ABNORMAL
DIFFERENTIAL METHOD: ABNORMAL
EOSINOPHIL # BLD AUTO: 0.2 K/UL (ref 0–0.5)
EOSINOPHIL NFR BLD: 1.7 % (ref 0–8)
EOSINOPHIL NFR BLD: 1.8 % (ref 0–8)
EOSINOPHIL NFR BLD: 1.9 % (ref 0–8)
ERYTHROCYTE [DISTWIDTH] IN BLOOD BY AUTOMATED COUNT: 15.5 % (ref 11.5–14.5)
ERYTHROCYTE [DISTWIDTH] IN BLOOD BY AUTOMATED COUNT: 15.9 % (ref 11.5–14.5)
ERYTHROCYTE [DISTWIDTH] IN BLOOD BY AUTOMATED COUNT: 15.9 % (ref 11.5–14.5)
EST. GFR  (AFRICAN AMERICAN): 57.2 ML/MIN/1.73 M^2
EST. GFR  (AFRICAN AMERICAN): 57.2 ML/MIN/1.73 M^2
EST. GFR  (AFRICAN AMERICAN): >60 ML/MIN/1.73 M^2
EST. GFR  (AFRICAN AMERICAN): >60 ML/MIN/1.73 M^2
EST. GFR  (NON AFRICAN AMERICAN): 49.5 ML/MIN/1.73 M^2
EST. GFR  (NON AFRICAN AMERICAN): 49.5 ML/MIN/1.73 M^2
EST. GFR  (NON AFRICAN AMERICAN): 54.1 ML/MIN/1.73 M^2
EST. GFR  (NON AFRICAN AMERICAN): 54.1 ML/MIN/1.73 M^2
FIBRINOGEN PPP-MCNC: 550 MG/DL (ref 182–400)
GLUCOSE SERPL-MCNC: 108 MG/DL (ref 70–110)
GLUCOSE SERPL-MCNC: 143 MG/DL (ref 70–110)
GLUCOSE SERPL-MCNC: 149 MG/DL (ref 70–110)
GLUCOSE SERPL-MCNC: 211 MG/DL (ref 70–110)
HCO3 UR-SCNC: 25.3 MMOL/L (ref 24–28)
HCT VFR BLD AUTO: 27.2 % (ref 40–54)
HCT VFR BLD AUTO: 27.3 % (ref 40–54)
HCT VFR BLD AUTO: 28.3 % (ref 40–54)
HGB BLD-MCNC: 9.1 G/DL (ref 14–18)
HGB BLD-MCNC: 9.3 G/DL (ref 14–18)
HGB BLD-MCNC: 9.6 G/DL (ref 14–18)
IMM GRANULOCYTES # BLD AUTO: 0.06 K/UL (ref 0–0.04)
IMM GRANULOCYTES # BLD AUTO: 0.1 K/UL (ref 0–0.04)
IMM GRANULOCYTES # BLD AUTO: 0.11 K/UL (ref 0–0.04)
IMM GRANULOCYTES NFR BLD AUTO: 0.7 % (ref 0–0.5)
IMM GRANULOCYTES NFR BLD AUTO: 0.9 % (ref 0–0.5)
IMM GRANULOCYTES NFR BLD AUTO: 1 % (ref 0–0.5)
INR PPP: 1.2 (ref 0.8–1.2)
LYMPHOCYTES # BLD AUTO: 1.1 K/UL (ref 1–4.8)
LYMPHOCYTES # BLD AUTO: 1.2 K/UL (ref 1–4.8)
LYMPHOCYTES # BLD AUTO: 1.8 K/UL (ref 1–4.8)
LYMPHOCYTES NFR BLD: 11.4 % (ref 18–48)
LYMPHOCYTES NFR BLD: 13.5 % (ref 18–48)
LYMPHOCYTES NFR BLD: 15.2 % (ref 18–48)
MAGNESIUM SERPL-MCNC: 1.6 MG/DL (ref 1.6–2.6)
MAGNESIUM SERPL-MCNC: 1.7 MG/DL (ref 1.6–2.6)
MAGNESIUM SERPL-MCNC: 1.8 MG/DL (ref 1.6–2.6)
MAGNESIUM SERPL-MCNC: 2.3 MG/DL (ref 1.6–2.6)
MCH RBC QN AUTO: 27.7 PG (ref 27–31)
MCH RBC QN AUTO: 28.7 PG (ref 27–31)
MCH RBC QN AUTO: 28.8 PG (ref 27–31)
MCHC RBC AUTO-ENTMCNC: 33.3 G/DL (ref 32–36)
MCHC RBC AUTO-ENTMCNC: 33.9 G/DL (ref 32–36)
MCHC RBC AUTO-ENTMCNC: 34.2 G/DL (ref 32–36)
MCV RBC AUTO: 83 FL (ref 82–98)
MCV RBC AUTO: 84 FL (ref 82–98)
MCV RBC AUTO: 85 FL (ref 82–98)
MONOCYTES # BLD AUTO: 0.6 K/UL (ref 0.3–1)
MONOCYTES # BLD AUTO: 0.7 K/UL (ref 0.3–1)
MONOCYTES # BLD AUTO: 0.7 K/UL (ref 0.3–1)
MONOCYTES NFR BLD: 5.9 % (ref 4–15)
MONOCYTES NFR BLD: 6.2 % (ref 4–15)
MONOCYTES NFR BLD: 7.2 % (ref 4–15)
NEUTROPHILS # BLD AUTO: 7 K/UL (ref 1.8–7.7)
NEUTROPHILS # BLD AUTO: 7.9 K/UL (ref 1.8–7.7)
NEUTROPHILS # BLD AUTO: 9 K/UL (ref 1.8–7.7)
NEUTROPHILS NFR BLD: 75.4 % (ref 38–73)
NEUTROPHILS NFR BLD: 76.3 % (ref 38–73)
NEUTROPHILS NFR BLD: 79.6 % (ref 38–73)
NRBC BLD-RTO: 0 /100 WBC
PCO2 BLDA: 37.3 MMHG (ref 35–45)
PH SMN: 7.44 [PH] (ref 7.35–7.45)
PHOSPHATE SERPL-MCNC: 3.4 MG/DL (ref 2.7–4.5)
PLATELET # BLD AUTO: 219 K/UL (ref 150–450)
PLATELET # BLD AUTO: 237 K/UL (ref 150–450)
PLATELET # BLD AUTO: 258 K/UL (ref 150–450)
PMV BLD AUTO: 10 FL (ref 9.2–12.9)
PMV BLD AUTO: 10.1 FL (ref 9.2–12.9)
PMV BLD AUTO: 9.8 FL (ref 9.2–12.9)
PO2 BLDA: 30 MMHG (ref 40–60)
POC BE: 1 MMOL/L
POC SATURATED O2: 61 % (ref 95–100)
POC TCO2: 26 MMOL/L (ref 24–29)
POCT GLUCOSE: 125 MG/DL (ref 70–110)
POCT GLUCOSE: 151 MG/DL (ref 70–110)
POCT GLUCOSE: 154 MG/DL (ref 70–110)
POCT GLUCOSE: 229 MG/DL (ref 70–110)
POTASSIUM SERPL-SCNC: 3.9 MMOL/L (ref 3.5–5.1)
POTASSIUM SERPL-SCNC: 4 MMOL/L (ref 3.5–5.1)
POTASSIUM SERPL-SCNC: 4 MMOL/L (ref 3.5–5.1)
POTASSIUM SERPL-SCNC: 4.2 MMOL/L (ref 3.5–5.1)
PROT SERPL-MCNC: 6.3 G/DL (ref 6–8.4)
PROTHROMBIN TIME: 12.5 SEC (ref 9–12.5)
PROTHROMBIN TIME: 13 SEC (ref 9–12.5)
PROTHROMBIN TIME: 13.3 SEC (ref 9–12.5)
PROTHROMBIN TIME: 13.4 SEC (ref 9–12.5)
RBC # BLD AUTO: 3.23 M/UL (ref 4.6–6.2)
RBC # BLD AUTO: 3.29 M/UL (ref 4.6–6.2)
RBC # BLD AUTO: 3.34 M/UL (ref 4.6–6.2)
SAMPLE: ABNORMAL
SITE: ABNORMAL
SODIUM SERPL-SCNC: 121 MMOL/L (ref 136–145)
SODIUM SERPL-SCNC: 125 MMOL/L (ref 136–145)
SODIUM SERPL-SCNC: 128 MMOL/L (ref 136–145)
SODIUM SERPL-SCNC: 128 MMOL/L (ref 136–145)
VANCOMYCIN TROUGH SERPL-MCNC: 18.3 UG/ML (ref 10–22)
WBC # BLD AUTO: 11.99 K/UL (ref 3.9–12.7)
WBC # BLD AUTO: 9.2 K/UL (ref 3.9–12.7)
WBC # BLD AUTO: 9.9 K/UL (ref 3.9–12.7)

## 2021-06-02 PROCEDURE — 99233 SBSQ HOSP IP/OBS HIGH 50: CPT | Mod: ,,, | Performed by: HOSPITALIST

## 2021-06-02 PROCEDURE — 25000003 PHARM REV CODE 250: Performed by: INTERNAL MEDICINE

## 2021-06-02 PROCEDURE — 80053 COMPREHEN METABOLIC PANEL: CPT | Performed by: STUDENT IN AN ORGANIZED HEALTH CARE EDUCATION/TRAINING PROGRAM

## 2021-06-02 PROCEDURE — 80048 BASIC METABOLIC PNL TOTAL CA: CPT | Performed by: STUDENT IN AN ORGANIZED HEALTH CARE EDUCATION/TRAINING PROGRAM

## 2021-06-02 PROCEDURE — 85025 COMPLETE CBC W/AUTO DIFF WBC: CPT | Performed by: STUDENT IN AN ORGANIZED HEALTH CARE EDUCATION/TRAINING PROGRAM

## 2021-06-02 PROCEDURE — 85730 THROMBOPLASTIN TIME PARTIAL: CPT | Performed by: STUDENT IN AN ORGANIZED HEALTH CARE EDUCATION/TRAINING PROGRAM

## 2021-06-02 PROCEDURE — 99233 SBSQ HOSP IP/OBS HIGH 50: CPT | Mod: ,,, | Performed by: INTERNAL MEDICINE

## 2021-06-02 PROCEDURE — 92610 EVALUATE SWALLOWING FUNCTION: CPT

## 2021-06-02 PROCEDURE — 85025 COMPLETE CBC W/AUTO DIFF WBC: CPT | Mod: 91 | Performed by: STUDENT IN AN ORGANIZED HEALTH CARE EDUCATION/TRAINING PROGRAM

## 2021-06-02 PROCEDURE — 25000003 PHARM REV CODE 250: Performed by: STUDENT IN AN ORGANIZED HEALTH CARE EDUCATION/TRAINING PROGRAM

## 2021-06-02 PROCEDURE — 83735 ASSAY OF MAGNESIUM: CPT | Mod: 91 | Performed by: STUDENT IN AN ORGANIZED HEALTH CARE EDUCATION/TRAINING PROGRAM

## 2021-06-02 PROCEDURE — 84100 ASSAY OF PHOSPHORUS: CPT | Performed by: STUDENT IN AN ORGANIZED HEALTH CARE EDUCATION/TRAINING PROGRAM

## 2021-06-02 PROCEDURE — 97110 THERAPEUTIC EXERCISES: CPT | Mod: CQ

## 2021-06-02 PROCEDURE — 20000000 HC ICU ROOM

## 2021-06-02 PROCEDURE — 92526 ORAL FUNCTION THERAPY: CPT

## 2021-06-02 PROCEDURE — 99233 PR SUBSEQUENT HOSPITAL CARE,LEVL III: ICD-10-PCS | Mod: ,,, | Performed by: INTERNAL MEDICINE

## 2021-06-02 PROCEDURE — 82803 BLOOD GASES ANY COMBINATION: CPT

## 2021-06-02 PROCEDURE — 86900 BLOOD TYPING SEROLOGIC ABO: CPT | Performed by: INTERNAL MEDICINE

## 2021-06-02 PROCEDURE — 99900035 HC TECH TIME PER 15 MIN (STAT)

## 2021-06-02 PROCEDURE — 85610 PROTHROMBIN TIME: CPT | Mod: 91 | Performed by: STUDENT IN AN ORGANIZED HEALTH CARE EDUCATION/TRAINING PROGRAM

## 2021-06-02 PROCEDURE — 63600175 PHARM REV CODE 636 W HCPCS: Performed by: STUDENT IN AN ORGANIZED HEALTH CARE EDUCATION/TRAINING PROGRAM

## 2021-06-02 PROCEDURE — 85384 FIBRINOGEN ACTIVITY: CPT | Performed by: EMERGENCY MEDICINE

## 2021-06-02 PROCEDURE — 80202 ASSAY OF VANCOMYCIN: CPT | Performed by: INTERNAL MEDICINE

## 2021-06-02 PROCEDURE — 99233 PR SUBSEQUENT HOSPITAL CARE,LEVL III: ICD-10-PCS | Mod: ,,, | Performed by: HOSPITALIST

## 2021-06-02 RX ORDER — POTASSIUM CHLORIDE 20 MEQ/1
40 TABLET, EXTENDED RELEASE ORAL ONCE
Status: DISCONTINUED | OUTPATIENT
Start: 2021-06-02 | End: 2021-06-02

## 2021-06-02 RX ORDER — MAGNESIUM SULFATE HEPTAHYDRATE 40 MG/ML
2 INJECTION, SOLUTION INTRAVENOUS
Status: DISCONTINUED | OUTPATIENT
Start: 2021-06-02 | End: 2021-06-10

## 2021-06-02 RX ORDER — MAGNESIUM SULFATE HEPTAHYDRATE 40 MG/ML
2 INJECTION, SOLUTION INTRAVENOUS ONCE
Status: DISCONTINUED | OUTPATIENT
Start: 2021-06-02 | End: 2021-06-08

## 2021-06-02 RX ORDER — MAGNESIUM SULFATE HEPTAHYDRATE 40 MG/ML
4 INJECTION, SOLUTION INTRAVENOUS
Status: DISCONTINUED | OUTPATIENT
Start: 2021-06-02 | End: 2021-06-10

## 2021-06-02 RX ORDER — POTASSIUM CHLORIDE 20 MEQ/1
40 TABLET, EXTENDED RELEASE ORAL 2 TIMES DAILY
Status: DISCONTINUED | OUTPATIENT
Start: 2021-06-02 | End: 2021-06-04

## 2021-06-02 RX ORDER — SODIUM,POTASSIUM PHOSPHATES 280-250MG
2 POWDER IN PACKET (EA) ORAL
Status: DISCONTINUED | OUTPATIENT
Start: 2021-06-02 | End: 2021-06-10

## 2021-06-02 RX ORDER — HEPARIN SODIUM 5000 [USP'U]/ML
5000 INJECTION, SOLUTION INTRAVENOUS; SUBCUTANEOUS EVERY 8 HOURS
Status: DISCONTINUED | OUTPATIENT
Start: 2021-06-02 | End: 2021-06-16 | Stop reason: HOSPADM

## 2021-06-02 RX ORDER — LANOLIN ALCOHOL/MO/W.PET/CERES
400 CREAM (GRAM) TOPICAL ONCE
Status: COMPLETED | OUTPATIENT
Start: 2021-06-02 | End: 2021-06-02

## 2021-06-02 RX ADMIN — POLYETHYLENE GLYCOL 3350 17 G: 17 POWDER, FOR SOLUTION ORAL at 09:06

## 2021-06-02 RX ADMIN — DOCUSATE SODIUM 50 MG: 50 CAPSULE, LIQUID FILLED ORAL at 09:06

## 2021-06-02 RX ADMIN — HEPARIN SODIUM 5000 UNITS: 5000 INJECTION INTRAVENOUS; SUBCUTANEOUS at 02:06

## 2021-06-02 RX ADMIN — POTASSIUM CHLORIDE 40 MEQ: 1500 TABLET, EXTENDED RELEASE ORAL at 06:06

## 2021-06-02 RX ADMIN — FUROSEMIDE 120 MG: 10 INJECTION, SOLUTION INTRAMUSCULAR; INTRAVENOUS at 08:06

## 2021-06-02 RX ADMIN — INSULIN ASPART 2 UNITS: 100 INJECTION, SOLUTION INTRAVENOUS; SUBCUTANEOUS at 06:06

## 2021-06-02 RX ADMIN — PANTOPRAZOLE SODIUM 40 MG: 40 TABLET, DELAYED RELEASE ORAL at 08:06

## 2021-06-02 RX ADMIN — HEPARIN SODIUM 5000 UNITS: 5000 INJECTION INTRAVENOUS; SUBCUTANEOUS at 09:06

## 2021-06-02 RX ADMIN — NOREPINEPHRINE BITARTRATE 0.02 MCG/KG/MIN: 1 INJECTION, SOLUTION, CONCENTRATE INTRAVENOUS at 10:06

## 2021-06-02 RX ADMIN — HYPROMELLOSE 2910 1 DROP: 5 SOLUTION OPHTHALMIC at 08:06

## 2021-06-02 RX ADMIN — FUROSEMIDE 120 MG: 10 INJECTION, SOLUTION INTRAMUSCULAR; INTRAVENOUS at 09:06

## 2021-06-02 RX ADMIN — INSULIN ASPART 2 UNITS: 100 INJECTION, SOLUTION INTRAVENOUS; SUBCUTANEOUS at 11:06

## 2021-06-02 RX ADMIN — SODIUM CHLORIDE: 0.9 INJECTION, SOLUTION INTRAVENOUS at 06:06

## 2021-06-02 RX ADMIN — HYPROMELLOSE 2910 1 DROP: 5 SOLUTION OPHTHALMIC at 05:06

## 2021-06-02 RX ADMIN — HYDROCODONE BITARTRATE AND ACETAMINOPHEN 1 TABLET: 5; 325 TABLET ORAL at 11:06

## 2021-06-02 RX ADMIN — VANCOMYCIN HYDROCHLORIDE 1000 MG: 1 INJECTION, POWDER, LYOPHILIZED, FOR SOLUTION INTRAVENOUS at 09:06

## 2021-06-02 RX ADMIN — MAGNESIUM SULFATE 2 G: 2 INJECTION INTRAVENOUS at 02:06

## 2021-06-02 RX ADMIN — ASPIRIN 81 MG: 81 TABLET, COATED ORAL at 08:06

## 2021-06-02 RX ADMIN — HYDROCODONE BITARTRATE AND ACETAMINOPHEN 1 TABLET: 5; 325 TABLET ORAL at 05:06

## 2021-06-02 RX ADMIN — INSULIN ASPART 2 UNITS: 100 INJECTION, SOLUTION INTRAVENOUS; SUBCUTANEOUS at 08:06

## 2021-06-02 RX ADMIN — MAGNESIUM OXIDE TAB 400 MG (241.3 MG ELEMENTAL MG) 400 MG: 400 (241.3 MG) TAB at 04:06

## 2021-06-03 PROBLEM — B95.7 STAPHYLOCOCCUS EPIDERMIDIS BACTEREMIA: Status: ACTIVE | Noted: 2021-05-24

## 2021-06-03 LAB
ALBUMIN SERPL BCP-MCNC: 2.5 G/DL (ref 3.5–5.2)
ALLENS TEST: ABNORMAL
ALP SERPL-CCNC: 137 U/L (ref 55–135)
ALT SERPL W/O P-5'-P-CCNC: 15 U/L (ref 10–44)
ANION GAP SERPL CALC-SCNC: 10 MMOL/L (ref 8–16)
ANION GAP SERPL CALC-SCNC: 10 MMOL/L (ref 8–16)
ANION GAP SERPL CALC-SCNC: 9 MMOL/L (ref 8–16)
APTT BLDCRRT: 30.4 SEC (ref 21–32)
AST SERPL-CCNC: 21 U/L (ref 10–40)
BASOPHILS # BLD AUTO: 0.08 K/UL (ref 0–0.2)
BASOPHILS # BLD AUTO: 0.08 K/UL (ref 0–0.2)
BASOPHILS NFR BLD: 0.8 % (ref 0–1.9)
BASOPHILS NFR BLD: 0.8 % (ref 0–1.9)
BILIRUB SERPL-MCNC: 0.7 MG/DL (ref 0.1–1)
BUN SERPL-MCNC: 28 MG/DL (ref 8–23)
BUN SERPL-MCNC: 29 MG/DL (ref 8–23)
BUN SERPL-MCNC: 30 MG/DL (ref 8–23)
CALCIUM SERPL-MCNC: 8.4 MG/DL (ref 8.7–10.5)
CALCIUM SERPL-MCNC: 8.5 MG/DL (ref 8.7–10.5)
CALCIUM SERPL-MCNC: 8.6 MG/DL (ref 8.7–10.5)
CHLORIDE SERPL-SCNC: 97 MMOL/L (ref 95–110)
CO2 SERPL-SCNC: 23 MMOL/L (ref 23–29)
CO2 SERPL-SCNC: 23 MMOL/L (ref 23–29)
CO2 SERPL-SCNC: 24 MMOL/L (ref 23–29)
CREAT SERPL-MCNC: 1.3 MG/DL (ref 0.5–1.4)
CREAT SERPL-MCNC: 1.4 MG/DL (ref 0.5–1.4)
CREAT SERPL-MCNC: 1.4 MG/DL (ref 0.5–1.4)
CRP SERPL-MCNC: 54.6 MG/L (ref 0–8.2)
DELSYS: ABNORMAL
DIFFERENTIAL METHOD: ABNORMAL
DIFFERENTIAL METHOD: ABNORMAL
EOSINOPHIL # BLD AUTO: 0.2 K/UL (ref 0–0.5)
EOSINOPHIL # BLD AUTO: 0.2 K/UL (ref 0–0.5)
EOSINOPHIL NFR BLD: 1.6 % (ref 0–8)
EOSINOPHIL NFR BLD: 1.9 % (ref 0–8)
ERYTHROCYTE [DISTWIDTH] IN BLOOD BY AUTOMATED COUNT: 15.9 % (ref 11.5–14.5)
ERYTHROCYTE [DISTWIDTH] IN BLOOD BY AUTOMATED COUNT: 16.2 % (ref 11.5–14.5)
ERYTHROCYTE [SEDIMENTATION RATE] IN BLOOD BY WESTERGREN METHOD: 67 MM/HR (ref 0–23)
EST. GFR  (AFRICAN AMERICAN): 57.2 ML/MIN/1.73 M^2
EST. GFR  (AFRICAN AMERICAN): 57.2 ML/MIN/1.73 M^2
EST. GFR  (AFRICAN AMERICAN): >60 ML/MIN/1.73 M^2
EST. GFR  (NON AFRICAN AMERICAN): 49.5 ML/MIN/1.73 M^2
EST. GFR  (NON AFRICAN AMERICAN): 49.5 ML/MIN/1.73 M^2
EST. GFR  (NON AFRICAN AMERICAN): 54.1 ML/MIN/1.73 M^2
FIBRINOGEN PPP-MCNC: 570 MG/DL (ref 182–400)
GLUCOSE SERPL-MCNC: 127 MG/DL (ref 70–110)
GLUCOSE SERPL-MCNC: 92 MG/DL (ref 70–110)
GLUCOSE SERPL-MCNC: 95 MG/DL (ref 70–110)
HCO3 UR-SCNC: 27.3 MMOL/L (ref 24–28)
HCT VFR BLD AUTO: 28.1 % (ref 40–54)
HCT VFR BLD AUTO: 29 % (ref 40–54)
HGB BLD-MCNC: 9.4 G/DL (ref 14–18)
HGB BLD-MCNC: 9.4 G/DL (ref 14–18)
IMM GRANULOCYTES # BLD AUTO: 0.07 K/UL (ref 0–0.04)
IMM GRANULOCYTES # BLD AUTO: 0.08 K/UL (ref 0–0.04)
IMM GRANULOCYTES NFR BLD AUTO: 0.7 % (ref 0–0.5)
IMM GRANULOCYTES NFR BLD AUTO: 0.8 % (ref 0–0.5)
INR PPP: 1.2 (ref 0.8–1.2)
LYMPHOCYTES # BLD AUTO: 1.5 K/UL (ref 1–4.8)
LYMPHOCYTES # BLD AUTO: 1.7 K/UL (ref 1–4.8)
LYMPHOCYTES NFR BLD: 15.7 % (ref 18–48)
LYMPHOCYTES NFR BLD: 17.1 % (ref 18–48)
MAGNESIUM SERPL-MCNC: 2 MG/DL (ref 1.6–2.6)
MAGNESIUM SERPL-MCNC: 2 MG/DL (ref 1.6–2.6)
MAGNESIUM SERPL-MCNC: 2.1 MG/DL (ref 1.6–2.6)
MAGNESIUM SERPL-MCNC: 2.2 MG/DL (ref 1.6–2.6)
MCH RBC QN AUTO: 28 PG (ref 27–31)
MCH RBC QN AUTO: 28.1 PG (ref 27–31)
MCHC RBC AUTO-ENTMCNC: 32.4 G/DL (ref 32–36)
MCHC RBC AUTO-ENTMCNC: 33.5 G/DL (ref 32–36)
MCV RBC AUTO: 84 FL (ref 82–98)
MCV RBC AUTO: 86 FL (ref 82–98)
MONOCYTES # BLD AUTO: 0.7 K/UL (ref 0.3–1)
MONOCYTES # BLD AUTO: 0.8 K/UL (ref 0.3–1)
MONOCYTES NFR BLD: 7.1 % (ref 4–15)
MONOCYTES NFR BLD: 8.3 % (ref 4–15)
NEUTROPHILS # BLD AUTO: 7 K/UL (ref 1.8–7.7)
NEUTROPHILS # BLD AUTO: 7 K/UL (ref 1.8–7.7)
NEUTROPHILS NFR BLD: 71.4 % (ref 38–73)
NEUTROPHILS NFR BLD: 73.8 % (ref 38–73)
NRBC BLD-RTO: 0 /100 WBC
NRBC BLD-RTO: 0 /100 WBC
PCO2 BLDA: 40.2 MMHG (ref 35–45)
PH SMN: 7.44 [PH] (ref 7.35–7.45)
PHOSPHATE SERPL-MCNC: 3.4 MG/DL (ref 2.7–4.5)
PLATELET # BLD AUTO: 241 K/UL (ref 150–450)
PLATELET # BLD AUTO: 243 K/UL (ref 150–450)
PMV BLD AUTO: 10 FL (ref 9.2–12.9)
PMV BLD AUTO: 9.8 FL (ref 9.2–12.9)
PO2 BLDA: 28 MMHG (ref 40–60)
POC BE: 3 MMOL/L
POC SATURATED O2: 56 % (ref 95–100)
POC TCO2: 29 MMOL/L (ref 24–29)
POCT GLUCOSE: 100 MG/DL (ref 70–110)
POCT GLUCOSE: 194 MG/DL (ref 70–110)
POCT GLUCOSE: 89 MG/DL (ref 70–110)
POCT GLUCOSE: 99 MG/DL (ref 70–110)
POTASSIUM SERPL-SCNC: 4.4 MMOL/L (ref 3.5–5.1)
POTASSIUM SERPL-SCNC: 4.5 MMOL/L (ref 3.5–5.1)
POTASSIUM SERPL-SCNC: 4.7 MMOL/L (ref 3.5–5.1)
PROT SERPL-MCNC: 6.3 G/DL (ref 6–8.4)
PROTHROMBIN TIME: 12.8 SEC (ref 9–12.5)
PROTHROMBIN TIME: 12.9 SEC (ref 9–12.5)
PROTHROMBIN TIME: 12.9 SEC (ref 9–12.5)
PROTHROMBIN TIME: 13.1 SEC (ref 9–12.5)
RBC # BLD AUTO: 3.34 M/UL (ref 4.6–6.2)
RBC # BLD AUTO: 3.36 M/UL (ref 4.6–6.2)
SAMPLE: ABNORMAL
SITE: ABNORMAL
SODIUM SERPL-SCNC: 130 MMOL/L (ref 136–145)
WBC # BLD AUTO: 9.45 K/UL (ref 3.9–12.7)
WBC # BLD AUTO: 9.78 K/UL (ref 3.9–12.7)

## 2021-06-03 PROCEDURE — 83735 ASSAY OF MAGNESIUM: CPT | Mod: 91 | Performed by: STUDENT IN AN ORGANIZED HEALTH CARE EDUCATION/TRAINING PROGRAM

## 2021-06-03 PROCEDURE — 82803 BLOOD GASES ANY COMBINATION: CPT

## 2021-06-03 PROCEDURE — 86140 C-REACTIVE PROTEIN: CPT | Performed by: STUDENT IN AN ORGANIZED HEALTH CARE EDUCATION/TRAINING PROGRAM

## 2021-06-03 PROCEDURE — 97110 THERAPEUTIC EXERCISES: CPT

## 2021-06-03 PROCEDURE — 80053 COMPREHEN METABOLIC PANEL: CPT | Performed by: STUDENT IN AN ORGANIZED HEALTH CARE EDUCATION/TRAINING PROGRAM

## 2021-06-03 PROCEDURE — 83735 ASSAY OF MAGNESIUM: CPT | Performed by: STUDENT IN AN ORGANIZED HEALTH CARE EDUCATION/TRAINING PROGRAM

## 2021-06-03 PROCEDURE — 25000003 PHARM REV CODE 250: Performed by: INTERNAL MEDICINE

## 2021-06-03 PROCEDURE — 84100 ASSAY OF PHOSPHORUS: CPT | Performed by: STUDENT IN AN ORGANIZED HEALTH CARE EDUCATION/TRAINING PROGRAM

## 2021-06-03 PROCEDURE — 99233 SBSQ HOSP IP/OBS HIGH 50: CPT | Mod: ,,, | Performed by: HOSPITALIST

## 2021-06-03 PROCEDURE — 99231 SBSQ HOSP IP/OBS SF/LOW 25: CPT | Mod: ,,, | Performed by: INTERNAL MEDICINE

## 2021-06-03 PROCEDURE — 85610 PROTHROMBIN TIME: CPT | Mod: 91 | Performed by: STUDENT IN AN ORGANIZED HEALTH CARE EDUCATION/TRAINING PROGRAM

## 2021-06-03 PROCEDURE — 63600175 PHARM REV CODE 636 W HCPCS: Performed by: STUDENT IN AN ORGANIZED HEALTH CARE EDUCATION/TRAINING PROGRAM

## 2021-06-03 PROCEDURE — 80048 BASIC METABOLIC PNL TOTAL CA: CPT | Performed by: STUDENT IN AN ORGANIZED HEALTH CARE EDUCATION/TRAINING PROGRAM

## 2021-06-03 PROCEDURE — 25000003 PHARM REV CODE 250: Performed by: STUDENT IN AN ORGANIZED HEALTH CARE EDUCATION/TRAINING PROGRAM

## 2021-06-03 PROCEDURE — 85652 RBC SED RATE AUTOMATED: CPT | Performed by: STUDENT IN AN ORGANIZED HEALTH CARE EDUCATION/TRAINING PROGRAM

## 2021-06-03 PROCEDURE — 99900035 HC TECH TIME PER 15 MIN (STAT)

## 2021-06-03 PROCEDURE — 85025 COMPLETE CBC W/AUTO DIFF WBC: CPT | Performed by: STUDENT IN AN ORGANIZED HEALTH CARE EDUCATION/TRAINING PROGRAM

## 2021-06-03 PROCEDURE — 80048 BASIC METABOLIC PNL TOTAL CA: CPT | Mod: 91 | Performed by: STUDENT IN AN ORGANIZED HEALTH CARE EDUCATION/TRAINING PROGRAM

## 2021-06-03 PROCEDURE — 20000000 HC ICU ROOM

## 2021-06-03 PROCEDURE — 99233 PR SUBSEQUENT HOSPITAL CARE,LEVL III: ICD-10-PCS | Mod: ,,, | Performed by: HOSPITALIST

## 2021-06-03 PROCEDURE — 99223 1ST HOSP IP/OBS HIGH 75: CPT | Mod: ,,, | Performed by: INTERNAL MEDICINE

## 2021-06-03 PROCEDURE — 85025 COMPLETE CBC W/AUTO DIFF WBC: CPT | Mod: 91 | Performed by: STUDENT IN AN ORGANIZED HEALTH CARE EDUCATION/TRAINING PROGRAM

## 2021-06-03 PROCEDURE — 99231 PR SUBSEQUENT HOSPITAL CARE,LEVL I: ICD-10-PCS | Mod: ,,, | Performed by: INTERNAL MEDICINE

## 2021-06-03 PROCEDURE — 85730 THROMBOPLASTIN TIME PARTIAL: CPT | Performed by: STUDENT IN AN ORGANIZED HEALTH CARE EDUCATION/TRAINING PROGRAM

## 2021-06-03 PROCEDURE — 94761 N-INVAS EAR/PLS OXIMETRY MLT: CPT

## 2021-06-03 PROCEDURE — 99223 PR INITIAL HOSPITAL CARE,LEVL III: ICD-10-PCS | Mod: ,,, | Performed by: INTERNAL MEDICINE

## 2021-06-03 PROCEDURE — 85384 FIBRINOGEN ACTIVITY: CPT | Performed by: EMERGENCY MEDICINE

## 2021-06-03 PROCEDURE — 97535 SELF CARE MNGMENT TRAINING: CPT

## 2021-06-03 RX ORDER — FUROSEMIDE 10 MG/ML
40 INJECTION INTRAMUSCULAR; INTRAVENOUS 2 TIMES DAILY
Status: DISCONTINUED | OUTPATIENT
Start: 2021-06-03 | End: 2021-06-03

## 2021-06-03 RX ORDER — FUROSEMIDE 10 MG/ML
80 INJECTION INTRAMUSCULAR; INTRAVENOUS 2 TIMES DAILY
Status: DISCONTINUED | OUTPATIENT
Start: 2021-06-03 | End: 2021-06-05

## 2021-06-03 RX ADMIN — HYDROCODONE BITARTRATE AND ACETAMINOPHEN 1 TABLET: 5; 325 TABLET ORAL at 02:06

## 2021-06-03 RX ADMIN — POLYETHYLENE GLYCOL 3350 17 G: 17 POWDER, FOR SOLUTION ORAL at 08:06

## 2021-06-03 RX ADMIN — ASPIRIN 81 MG: 81 TABLET, COATED ORAL at 08:06

## 2021-06-03 RX ADMIN — INSULIN ASPART 2 UNITS: 100 INJECTION, SOLUTION INTRAVENOUS; SUBCUTANEOUS at 11:06

## 2021-06-03 RX ADMIN — HEPARIN SODIUM 5000 UNITS: 5000 INJECTION INTRAVENOUS; SUBCUTANEOUS at 06:06

## 2021-06-03 RX ADMIN — SODIUM CHLORIDE: 0.9 INJECTION, SOLUTION INTRAVENOUS at 01:06

## 2021-06-03 RX ADMIN — VANCOMYCIN HYDROCHLORIDE 1000 MG: 1 INJECTION, POWDER, LYOPHILIZED, FOR SOLUTION INTRAVENOUS at 09:06

## 2021-06-03 RX ADMIN — HYPROMELLOSE 2910 1 DROP: 5 SOLUTION OPHTHALMIC at 08:06

## 2021-06-03 RX ADMIN — HEPARIN SODIUM 5000 UNITS: 5000 INJECTION INTRAVENOUS; SUBCUTANEOUS at 02:06

## 2021-06-03 RX ADMIN — HYDROCODONE BITARTRATE AND ACETAMINOPHEN 1 TABLET: 5; 325 TABLET ORAL at 08:06

## 2021-06-03 RX ADMIN — HYDROCODONE BITARTRATE AND ACETAMINOPHEN 1 TABLET: 5; 325 TABLET ORAL at 12:06

## 2021-06-03 RX ADMIN — PANTOPRAZOLE SODIUM 40 MG: 40 TABLET, DELAYED RELEASE ORAL at 08:06

## 2021-06-03 RX ADMIN — POTASSIUM CHLORIDE 40 MEQ: 1500 TABLET, EXTENDED RELEASE ORAL at 09:06

## 2021-06-03 RX ADMIN — DOCUSATE SODIUM 50 MG: 50 CAPSULE, LIQUID FILLED ORAL at 08:06

## 2021-06-03 RX ADMIN — HEPARIN SODIUM 5000 UNITS: 5000 INJECTION INTRAVENOUS; SUBCUTANEOUS at 09:06

## 2021-06-03 RX ADMIN — FUROSEMIDE 80 MG: 10 INJECTION, SOLUTION INTRAMUSCULAR; INTRAVENOUS at 09:06

## 2021-06-03 RX ADMIN — FUROSEMIDE 40 MG: 10 INJECTION, SOLUTION INTRAMUSCULAR; INTRAVENOUS at 11:06

## 2021-06-04 ENCOUNTER — ANESTHESIA EVENT (OUTPATIENT)
Dept: MEDSURG UNIT | Facility: HOSPITAL | Age: 76
DRG: 314 | End: 2021-06-04
Payer: OTHER GOVERNMENT

## 2021-06-04 PROBLEM — R07.9 CHEST PAIN: Status: ACTIVE | Noted: 2021-06-04

## 2021-06-04 PROBLEM — T82.7XXA AICD LEAD INFECTION: Status: ACTIVE | Noted: 2021-06-04

## 2021-06-04 LAB
ALBUMIN SERPL BCP-MCNC: 2.5 G/DL (ref 3.5–5.2)
ALLENS TEST: ABNORMAL
ALP SERPL-CCNC: 133 U/L (ref 55–135)
ALT SERPL W/O P-5'-P-CCNC: 14 U/L (ref 10–44)
ANION GAP SERPL CALC-SCNC: 9 MMOL/L (ref 8–16)
APTT BLDCRRT: 33.7 SEC (ref 21–32)
AST SERPL-CCNC: 21 U/L (ref 10–40)
BASOPHILS # BLD AUTO: 0.09 K/UL (ref 0–0.2)
BASOPHILS # BLD AUTO: 0.09 K/UL (ref 0–0.2)
BASOPHILS NFR BLD: 1 % (ref 0–1.9)
BASOPHILS NFR BLD: 1 % (ref 0–1.9)
BILIRUB SERPL-MCNC: 0.6 MG/DL (ref 0.1–1)
BSA FOR ECHO PROCEDURE: 1.95 M2
BUN SERPL-MCNC: 29 MG/DL (ref 8–23)
CALCIUM SERPL-MCNC: 8.5 MG/DL (ref 8.7–10.5)
CHLORIDE SERPL-SCNC: 98 MMOL/L (ref 95–110)
CO2 SERPL-SCNC: 23 MMOL/L (ref 23–29)
CREAT SERPL-MCNC: 1.4 MG/DL (ref 0.5–1.4)
DELSYS: ABNORMAL
DIFFERENTIAL METHOD: ABNORMAL
DIFFERENTIAL METHOD: ABNORMAL
EOSINOPHIL # BLD AUTO: 0.1 K/UL (ref 0–0.5)
EOSINOPHIL # BLD AUTO: 0.1 K/UL (ref 0–0.5)
EOSINOPHIL NFR BLD: 1.3 % (ref 0–8)
EOSINOPHIL NFR BLD: 1.3 % (ref 0–8)
ERYTHROCYTE [DISTWIDTH] IN BLOOD BY AUTOMATED COUNT: 16 % (ref 11.5–14.5)
ERYTHROCYTE [DISTWIDTH] IN BLOOD BY AUTOMATED COUNT: 16 % (ref 11.5–14.5)
EST. GFR  (AFRICAN AMERICAN): 57.2 ML/MIN/1.73 M^2
EST. GFR  (NON AFRICAN AMERICAN): 49.5 ML/MIN/1.73 M^2
FIBRINOGEN PPP-MCNC: 532 MG/DL (ref 182–400)
GLUCOSE SERPL-MCNC: 88 MG/DL (ref 70–110)
HCO3 UR-SCNC: 27.2 MMOL/L (ref 24–28)
HCT VFR BLD AUTO: 27.2 % (ref 40–54)
HCT VFR BLD AUTO: 27.2 % (ref 40–54)
HGB BLD-MCNC: 8.8 G/DL (ref 14–18)
HGB BLD-MCNC: 8.8 G/DL (ref 14–18)
IMM GRANULOCYTES # BLD AUTO: 0.04 K/UL (ref 0–0.04)
IMM GRANULOCYTES # BLD AUTO: 0.04 K/UL (ref 0–0.04)
IMM GRANULOCYTES NFR BLD AUTO: 0.4 % (ref 0–0.5)
IMM GRANULOCYTES NFR BLD AUTO: 0.4 % (ref 0–0.5)
INR PPP: 1.2 (ref 0.8–1.2)
INR PPP: 1.2 (ref 0.8–1.2)
INR PPP: 1.3 (ref 0.8–1.2)
LYMPHOCYTES # BLD AUTO: 1.5 K/UL (ref 1–4.8)
LYMPHOCYTES # BLD AUTO: 1.5 K/UL (ref 1–4.8)
LYMPHOCYTES NFR BLD: 16.2 % (ref 18–48)
LYMPHOCYTES NFR BLD: 16.2 % (ref 18–48)
MAGNESIUM SERPL-MCNC: 1.9 MG/DL (ref 1.6–2.6)
MCH RBC QN AUTO: 27.5 PG (ref 27–31)
MCH RBC QN AUTO: 27.5 PG (ref 27–31)
MCHC RBC AUTO-ENTMCNC: 32.4 G/DL (ref 32–36)
MCHC RBC AUTO-ENTMCNC: 32.4 G/DL (ref 32–36)
MCV RBC AUTO: 85 FL (ref 82–98)
MCV RBC AUTO: 85 FL (ref 82–98)
MODE: ABNORMAL
MONOCYTES # BLD AUTO: 0.8 K/UL (ref 0.3–1)
MONOCYTES # BLD AUTO: 0.8 K/UL (ref 0.3–1)
MONOCYTES NFR BLD: 8.7 % (ref 4–15)
MONOCYTES NFR BLD: 8.7 % (ref 4–15)
NEUTROPHILS # BLD AUTO: 6.5 K/UL (ref 1.8–7.7)
NEUTROPHILS # BLD AUTO: 6.5 K/UL (ref 1.8–7.7)
NEUTROPHILS NFR BLD: 72.4 % (ref 38–73)
NEUTROPHILS NFR BLD: 72.4 % (ref 38–73)
NRBC BLD-RTO: 0 /100 WBC
NRBC BLD-RTO: 0 /100 WBC
PCO2 BLDA: 40.6 MMHG (ref 35–45)
PH SMN: 7.43 [PH] (ref 7.35–7.45)
PHOSPHATE SERPL-MCNC: 3.1 MG/DL (ref 2.7–4.5)
PLATELET # BLD AUTO: 224 K/UL (ref 150–450)
PLATELET # BLD AUTO: 224 K/UL (ref 150–450)
PMV BLD AUTO: 9.2 FL (ref 9.2–12.9)
PMV BLD AUTO: 9.2 FL (ref 9.2–12.9)
PO2 BLDA: 29 MMHG (ref 40–60)
POC BE: 3 MMOL/L
POC SATURATED O2: 57 % (ref 95–100)
POC TCO2: 28 MMOL/L (ref 24–29)
POCT GLUCOSE: 105 MG/DL (ref 70–110)
POCT GLUCOSE: 109 MG/DL (ref 70–110)
POCT GLUCOSE: 99 MG/DL (ref 70–110)
POTASSIUM SERPL-SCNC: 5 MMOL/L (ref 3.5–5.1)
PROT SERPL-MCNC: 6.3 G/DL (ref 6–8.4)
PROTHROMBIN TIME: 13 SEC (ref 9–12.5)
PROTHROMBIN TIME: 13.3 SEC (ref 9–12.5)
PROTHROMBIN TIME: 13.6 SEC (ref 9–12.5)
RBC # BLD AUTO: 3.2 M/UL (ref 4.6–6.2)
RBC # BLD AUTO: 3.2 M/UL (ref 4.6–6.2)
SAMPLE: ABNORMAL
SARS-COV-2 RNA RESP QL NAA+PROBE: NOT DETECTED
SITE: ABNORMAL
SODIUM SERPL-SCNC: 130 MMOL/L (ref 136–145)
WBC # BLD AUTO: 8.93 K/UL (ref 3.9–12.7)
WBC # BLD AUTO: 8.93 K/UL (ref 3.9–12.7)

## 2021-06-04 PROCEDURE — 97110 THERAPEUTIC EXERCISES: CPT

## 2021-06-04 PROCEDURE — 99232 PR SUBSEQUENT HOSPITAL CARE,LEVL II: ICD-10-PCS | Mod: ,,, | Performed by: INTERNAL MEDICINE

## 2021-06-04 PROCEDURE — 63600175 PHARM REV CODE 636 W HCPCS: Performed by: STUDENT IN AN ORGANIZED HEALTH CARE EDUCATION/TRAINING PROGRAM

## 2021-06-04 PROCEDURE — 99231 PR SUBSEQUENT HOSPITAL CARE,LEVL I: ICD-10-PCS | Mod: ,,, | Performed by: INTERNAL MEDICINE

## 2021-06-04 PROCEDURE — 83735 ASSAY OF MAGNESIUM: CPT | Performed by: STUDENT IN AN ORGANIZED HEALTH CARE EDUCATION/TRAINING PROGRAM

## 2021-06-04 PROCEDURE — 99900035 HC TECH TIME PER 15 MIN (STAT)

## 2021-06-04 PROCEDURE — 83735 ASSAY OF MAGNESIUM: CPT | Mod: 91 | Performed by: STUDENT IN AN ORGANIZED HEALTH CARE EDUCATION/TRAINING PROGRAM

## 2021-06-04 PROCEDURE — 99231 SBSQ HOSP IP/OBS SF/LOW 25: CPT | Mod: ,,, | Performed by: INTERNAL MEDICINE

## 2021-06-04 PROCEDURE — 37000008 HC ANESTHESIA 1ST 15 MINUTES

## 2021-06-04 PROCEDURE — 99233 SBSQ HOSP IP/OBS HIGH 50: CPT | Mod: ,,, | Performed by: HOSPITALIST

## 2021-06-04 PROCEDURE — 25000003 PHARM REV CODE 250: Performed by: STUDENT IN AN ORGANIZED HEALTH CARE EDUCATION/TRAINING PROGRAM

## 2021-06-04 PROCEDURE — U0005 INFEC AGEN DETEC AMPLI PROBE: HCPCS | Performed by: STUDENT IN AN ORGANIZED HEALTH CARE EDUCATION/TRAINING PROGRAM

## 2021-06-04 PROCEDURE — 85025 COMPLETE CBC W/AUTO DIFF WBC: CPT | Performed by: STUDENT IN AN ORGANIZED HEALTH CARE EDUCATION/TRAINING PROGRAM

## 2021-06-04 PROCEDURE — 85384 FIBRINOGEN ACTIVITY: CPT | Performed by: EMERGENCY MEDICINE

## 2021-06-04 PROCEDURE — 84100 ASSAY OF PHOSPHORUS: CPT | Performed by: STUDENT IN AN ORGANIZED HEALTH CARE EDUCATION/TRAINING PROGRAM

## 2021-06-04 PROCEDURE — 63600175 PHARM REV CODE 636 W HCPCS: Performed by: NURSE ANESTHETIST, CERTIFIED REGISTERED

## 2021-06-04 PROCEDURE — 85610 PROTHROMBIN TIME: CPT | Mod: 91 | Performed by: STUDENT IN AN ORGANIZED HEALTH CARE EDUCATION/TRAINING PROGRAM

## 2021-06-04 PROCEDURE — 99232 SBSQ HOSP IP/OBS MODERATE 35: CPT | Mod: ,,, | Performed by: INTERNAL MEDICINE

## 2021-06-04 PROCEDURE — 94761 N-INVAS EAR/PLS OXIMETRY MLT: CPT

## 2021-06-04 PROCEDURE — 37000009 HC ANESTHESIA EA ADD 15 MINS

## 2021-06-04 PROCEDURE — 99233 PR SUBSEQUENT HOSPITAL CARE,LEVL III: ICD-10-PCS | Mod: ,,, | Performed by: INTERNAL MEDICINE

## 2021-06-04 PROCEDURE — 20000000 HC ICU ROOM

## 2021-06-04 PROCEDURE — 99233 SBSQ HOSP IP/OBS HIGH 50: CPT | Mod: ,,, | Performed by: INTERNAL MEDICINE

## 2021-06-04 PROCEDURE — D9220A PRA ANESTHESIA: Mod: ,,, | Performed by: ANESTHESIOLOGY

## 2021-06-04 PROCEDURE — U0003 INFECTIOUS AGENT DETECTION BY NUCLEIC ACID (DNA OR RNA); SEVERE ACUTE RESPIRATORY SYNDROME CORONAVIRUS 2 (SARS-COV-2) (CORONAVIRUS DISEASE [COVID-19]), AMPLIFIED PROBE TECHNIQUE, MAKING USE OF HIGH THROUGHPUT TECHNOLOGIES AS DESCRIBED BY CMS-2020-01-R: HCPCS | Performed by: STUDENT IN AN ORGANIZED HEALTH CARE EDUCATION/TRAINING PROGRAM

## 2021-06-04 PROCEDURE — 99233 PR SUBSEQUENT HOSPITAL CARE,LEVL III: ICD-10-PCS | Mod: ,,, | Performed by: HOSPITALIST

## 2021-06-04 PROCEDURE — 85730 THROMBOPLASTIN TIME PARTIAL: CPT | Performed by: STUDENT IN AN ORGANIZED HEALTH CARE EDUCATION/TRAINING PROGRAM

## 2021-06-04 PROCEDURE — 25000003 PHARM REV CODE 250: Performed by: NURSE ANESTHETIST, CERTIFIED REGISTERED

## 2021-06-04 PROCEDURE — 80053 COMPREHEN METABOLIC PANEL: CPT | Performed by: STUDENT IN AN ORGANIZED HEALTH CARE EDUCATION/TRAINING PROGRAM

## 2021-06-04 PROCEDURE — D9220A PRA ANESTHESIA: ICD-10-PCS | Mod: ,,, | Performed by: ANESTHESIOLOGY

## 2021-06-04 RX ORDER — ETOMIDATE 2 MG/ML
INJECTION INTRAVENOUS
Status: DISCONTINUED | OUTPATIENT
Start: 2021-06-04 | End: 2021-06-04

## 2021-06-04 RX ORDER — METOPROLOL SUCCINATE 25 MG/1
25 TABLET, EXTENDED RELEASE ORAL DAILY
Status: DISCONTINUED | OUTPATIENT
Start: 2021-06-05 | End: 2021-06-05

## 2021-06-04 RX ORDER — PROPOFOL 10 MG/ML
VIAL (ML) INTRAVENOUS CONTINUOUS PRN
Status: DISCONTINUED | OUTPATIENT
Start: 2021-06-04 | End: 2021-06-04

## 2021-06-04 RX ADMIN — HEPARIN SODIUM 5000 UNITS: 5000 INJECTION INTRAVENOUS; SUBCUTANEOUS at 02:06

## 2021-06-04 RX ADMIN — ETOMIDATE 2 MG: 2 INJECTION INTRAVENOUS at 03:06

## 2021-06-04 RX ADMIN — FUROSEMIDE 80 MG: 10 INJECTION, SOLUTION INTRAMUSCULAR; INTRAVENOUS at 08:06

## 2021-06-04 RX ADMIN — HEPARIN SODIUM 5000 UNITS: 5000 INJECTION INTRAVENOUS; SUBCUTANEOUS at 06:06

## 2021-06-04 RX ADMIN — FUROSEMIDE 80 MG: 10 INJECTION, SOLUTION INTRAMUSCULAR; INTRAVENOUS at 10:06

## 2021-06-04 RX ADMIN — HYDROCODONE BITARTRATE AND ACETAMINOPHEN 1 TABLET: 5; 325 TABLET ORAL at 08:06

## 2021-06-04 RX ADMIN — ETOMIDATE 10 MG: 2 INJECTION INTRAVENOUS at 03:06

## 2021-06-04 RX ADMIN — PANTOPRAZOLE SODIUM 40 MG: 40 TABLET, DELAYED RELEASE ORAL at 08:06

## 2021-06-04 RX ADMIN — HEPARIN SODIUM 5000 UNITS: 5000 INJECTION INTRAVENOUS; SUBCUTANEOUS at 10:06

## 2021-06-04 RX ADMIN — TOPICAL ANESTHETIC 1 EACH: 200 SPRAY DENTAL; PERIODONTAL at 02:06

## 2021-06-04 RX ADMIN — ASPIRIN 81 MG: 81 TABLET, COATED ORAL at 08:06

## 2021-06-04 RX ADMIN — DOCUSATE SODIUM 50 MG: 50 CAPSULE, LIQUID FILLED ORAL at 08:06

## 2021-06-04 RX ADMIN — POLYETHYLENE GLYCOL 3350 17 G: 17 POWDER, FOR SOLUTION ORAL at 08:06

## 2021-06-04 RX ADMIN — VANCOMYCIN HYDROCHLORIDE 1000 MG: 1 INJECTION, POWDER, LYOPHILIZED, FOR SOLUTION INTRAVENOUS at 10:06

## 2021-06-04 RX ADMIN — PROPOFOL 50 MCG/KG/MIN: 10 INJECTION, EMULSION INTRAVENOUS at 03:06

## 2021-06-04 RX ADMIN — HYDROCODONE BITARTRATE AND ACETAMINOPHEN 1 TABLET: 5; 325 TABLET ORAL at 10:06

## 2021-06-05 LAB
ALBUMIN SERPL BCP-MCNC: 2.4 G/DL (ref 3.5–5.2)
ALLENS TEST: ABNORMAL
ALLENS TEST: ABNORMAL
ALLENS TEST: NORMAL
ALP SERPL-CCNC: 126 U/L (ref 55–135)
ALT SERPL W/O P-5'-P-CCNC: 11 U/L (ref 10–44)
ANION GAP SERPL CALC-SCNC: 9 MMOL/L (ref 8–16)
APTT BLDCRRT: 35.7 SEC (ref 21–32)
AST SERPL-CCNC: 20 U/L (ref 10–40)
BASOPHILS # BLD AUTO: 0.08 K/UL (ref 0–0.2)
BASOPHILS # BLD AUTO: 0.08 K/UL (ref 0–0.2)
BASOPHILS # BLD AUTO: 0.1 K/UL (ref 0–0.2)
BASOPHILS NFR BLD: 1.1 % (ref 0–1.9)
BASOPHILS NFR BLD: 1.1 % (ref 0–1.9)
BASOPHILS NFR BLD: 1.2 % (ref 0–1.9)
BILIRUB SERPL-MCNC: 0.7 MG/DL (ref 0.1–1)
BUN SERPL-MCNC: 24 MG/DL (ref 8–23)
BUN SERPL-MCNC: 25 MG/DL (ref 8–23)
CALCIUM SERPL-MCNC: 8.3 MG/DL (ref 8.7–10.5)
CALCIUM SERPL-MCNC: 8.6 MG/DL (ref 8.7–10.5)
CHLORIDE SERPL-SCNC: 98 MMOL/L (ref 95–110)
CHLORIDE SERPL-SCNC: 99 MMOL/L (ref 95–110)
CO2 SERPL-SCNC: 24 MMOL/L (ref 23–29)
CO2 SERPL-SCNC: 25 MMOL/L (ref 23–29)
CREAT SERPL-MCNC: 1.4 MG/DL (ref 0.5–1.4)
CREAT SERPL-MCNC: 1.5 MG/DL (ref 0.5–1.4)
DELSYS: ABNORMAL
DELSYS: ABNORMAL
DELSYS: NORMAL
DIFFERENTIAL METHOD: ABNORMAL
EOSINOPHIL # BLD AUTO: 0.1 K/UL (ref 0–0.5)
EOSINOPHIL NFR BLD: 1.1 % (ref 0–8)
EOSINOPHIL NFR BLD: 1.8 % (ref 0–8)
EOSINOPHIL NFR BLD: 1.8 % (ref 0–8)
ERYTHROCYTE [DISTWIDTH] IN BLOOD BY AUTOMATED COUNT: 15.9 % (ref 11.5–14.5)
ERYTHROCYTE [DISTWIDTH] IN BLOOD BY AUTOMATED COUNT: 16 % (ref 11.5–14.5)
ERYTHROCYTE [DISTWIDTH] IN BLOOD BY AUTOMATED COUNT: 16 % (ref 11.5–14.5)
EST. GFR  (AFRICAN AMERICAN): 52.6 ML/MIN/1.73 M^2
EST. GFR  (AFRICAN AMERICAN): 57.2 ML/MIN/1.73 M^2
EST. GFR  (NON AFRICAN AMERICAN): 45.5 ML/MIN/1.73 M^2
EST. GFR  (NON AFRICAN AMERICAN): 49.5 ML/MIN/1.73 M^2
FIBRINOGEN PPP-MCNC: 570 MG/DL (ref 182–400)
FIO2: 28
FIO2: 28
GLUCOSE SERPL-MCNC: 102 MG/DL (ref 70–110)
GLUCOSE SERPL-MCNC: 95 MG/DL (ref 70–110)
HCO3 UR-SCNC: 24.8 MMOL/L (ref 24–28)
HCO3 UR-SCNC: 27.1 MMOL/L (ref 24–28)
HCT VFR BLD AUTO: 26.5 % (ref 40–54)
HGB BLD-MCNC: 8.4 G/DL (ref 14–18)
HGB BLD-MCNC: 8.4 G/DL (ref 14–18)
HGB BLD-MCNC: 8.7 G/DL (ref 14–18)
IMM GRANULOCYTES # BLD AUTO: 0.04 K/UL (ref 0–0.04)
IMM GRANULOCYTES NFR BLD AUTO: 0.5 % (ref 0–0.5)
INR PPP: 1.2 (ref 0.8–1.2)
INR PPP: 1.3 (ref 0.8–1.2)
INR PPP: 1.3 (ref 0.8–1.2)
LDH SERPL L TO P-CCNC: 0.58 MMOL/L (ref 0.36–1.25)
LYMPHOCYTES # BLD AUTO: 1.3 K/UL (ref 1–4.8)
LYMPHOCYTES # BLD AUTO: 1.3 K/UL (ref 1–4.8)
LYMPHOCYTES # BLD AUTO: 1.6 K/UL (ref 1–4.8)
LYMPHOCYTES NFR BLD: 17.4 % (ref 18–48)
LYMPHOCYTES NFR BLD: 17.4 % (ref 18–48)
LYMPHOCYTES NFR BLD: 19.3 % (ref 18–48)
MAGNESIUM SERPL-MCNC: 1.8 MG/DL (ref 1.6–2.6)
MAGNESIUM SERPL-MCNC: 2.4 MG/DL (ref 1.6–2.6)
MCH RBC QN AUTO: 27.7 PG (ref 27–31)
MCH RBC QN AUTO: 27.7 PG (ref 27–31)
MCH RBC QN AUTO: 28.5 PG (ref 27–31)
MCHC RBC AUTO-ENTMCNC: 31.7 G/DL (ref 32–36)
MCHC RBC AUTO-ENTMCNC: 31.7 G/DL (ref 32–36)
MCHC RBC AUTO-ENTMCNC: 32.8 G/DL (ref 32–36)
MCV RBC AUTO: 87 FL (ref 82–98)
MCV RBC AUTO: 88 FL (ref 82–98)
MCV RBC AUTO: 88 FL (ref 82–98)
MODE: ABNORMAL
MODE: ABNORMAL
MODE: NORMAL
MONOCYTES # BLD AUTO: 0.7 K/UL (ref 0.3–1)
MONOCYTES # BLD AUTO: 0.8 K/UL (ref 0.3–1)
MONOCYTES # BLD AUTO: 0.8 K/UL (ref 0.3–1)
MONOCYTES NFR BLD: 10.3 % (ref 4–15)
MONOCYTES NFR BLD: 10.3 % (ref 4–15)
MONOCYTES NFR BLD: 9 % (ref 4–15)
NEUTROPHILS # BLD AUTO: 5.2 K/UL (ref 1.8–7.7)
NEUTROPHILS # BLD AUTO: 5.2 K/UL (ref 1.8–7.7)
NEUTROPHILS # BLD AUTO: 5.5 K/UL (ref 1.8–7.7)
NEUTROPHILS NFR BLD: 68.9 % (ref 38–73)
NRBC BLD-RTO: 0 /100 WBC
PCO2 BLDA: 34.5 MMHG (ref 35–45)
PCO2 BLDA: 42.5 MMHG (ref 35–45)
PH SMN: 7.41 [PH] (ref 7.35–7.45)
PH SMN: 7.47 [PH] (ref 7.35–7.45)
PHOSPHATE SERPL-MCNC: 3.7 MG/DL (ref 2.7–4.5)
PLATELET # BLD AUTO: 226 K/UL (ref 150–450)
PLATELET # BLD AUTO: 226 K/UL (ref 150–450)
PLATELET # BLD AUTO: 232 K/UL (ref 150–450)
PMV BLD AUTO: 9.6 FL (ref 9.2–12.9)
PMV BLD AUTO: 9.8 FL (ref 9.2–12.9)
PMV BLD AUTO: 9.8 FL (ref 9.2–12.9)
PO2 BLDA: 34 MMHG (ref 40–60)
PO2 BLDA: 73 MMHG (ref 80–100)
POC BE: 1 MMOL/L
POC BE: 2 MMOL/L
POC SATURATED O2: 65 % (ref 95–100)
POC SATURATED O2: 96 % (ref 95–100)
POC TCO2: 26 MMOL/L (ref 23–27)
POC TCO2: 28 MMOL/L (ref 24–29)
POCT GLUCOSE: 100 MG/DL (ref 70–110)
POCT GLUCOSE: 110 MG/DL (ref 70–110)
POCT GLUCOSE: 134 MG/DL (ref 70–110)
POTASSIUM SERPL-SCNC: 4.5 MMOL/L (ref 3.5–5.1)
POTASSIUM SERPL-SCNC: 4.6 MMOL/L (ref 3.5–5.1)
PROT SERPL-MCNC: 6.1 G/DL (ref 6–8.4)
PROTHROMBIN TIME: 13.4 SEC (ref 9–12.5)
PROTHROMBIN TIME: 13.7 SEC (ref 9–12.5)
PROTHROMBIN TIME: 13.7 SEC (ref 9–12.5)
RBC # BLD AUTO: 3.03 M/UL (ref 4.6–6.2)
RBC # BLD AUTO: 3.03 M/UL (ref 4.6–6.2)
RBC # BLD AUTO: 3.05 M/UL (ref 4.6–6.2)
SAMPLE: ABNORMAL
SAMPLE: ABNORMAL
SAMPLE: NORMAL
SITE: ABNORMAL
SITE: ABNORMAL
SITE: NORMAL
SODIUM SERPL-SCNC: 132 MMOL/L (ref 136–145)
WBC # BLD AUTO: 7.57 K/UL (ref 3.9–12.7)
WBC # BLD AUTO: 7.57 K/UL (ref 3.9–12.7)
WBC # BLD AUTO: 8.03 K/UL (ref 3.9–12.7)

## 2021-06-05 PROCEDURE — 99231 SBSQ HOSP IP/OBS SF/LOW 25: CPT | Mod: ,,, | Performed by: INTERNAL MEDICINE

## 2021-06-05 PROCEDURE — 25000003 PHARM REV CODE 250: Performed by: STUDENT IN AN ORGANIZED HEALTH CARE EDUCATION/TRAINING PROGRAM

## 2021-06-05 PROCEDURE — 85384 FIBRINOGEN ACTIVITY: CPT | Performed by: EMERGENCY MEDICINE

## 2021-06-05 PROCEDURE — 99233 SBSQ HOSP IP/OBS HIGH 50: CPT | Mod: ,,, | Performed by: INTERNAL MEDICINE

## 2021-06-05 PROCEDURE — 80048 BASIC METABOLIC PNL TOTAL CA: CPT | Performed by: INTERNAL MEDICINE

## 2021-06-05 PROCEDURE — 27000221 HC OXYGEN, UP TO 24 HOURS

## 2021-06-05 PROCEDURE — 99900035 HC TECH TIME PER 15 MIN (STAT)

## 2021-06-05 PROCEDURE — 99233 PR SUBSEQUENT HOSPITAL CARE,LEVL III: ICD-10-PCS | Mod: ,,, | Performed by: INTERNAL MEDICINE

## 2021-06-05 PROCEDURE — 84100 ASSAY OF PHOSPHORUS: CPT | Performed by: STUDENT IN AN ORGANIZED HEALTH CARE EDUCATION/TRAINING PROGRAM

## 2021-06-05 PROCEDURE — 82803 BLOOD GASES ANY COMBINATION: CPT

## 2021-06-05 PROCEDURE — 37799 UNLISTED PX VASCULAR SURGERY: CPT

## 2021-06-05 PROCEDURE — 83735 ASSAY OF MAGNESIUM: CPT | Performed by: INTERNAL MEDICINE

## 2021-06-05 PROCEDURE — 85025 COMPLETE CBC W/AUTO DIFF WBC: CPT | Mod: 91 | Performed by: STUDENT IN AN ORGANIZED HEALTH CARE EDUCATION/TRAINING PROGRAM

## 2021-06-05 PROCEDURE — 99231 PR SUBSEQUENT HOSPITAL CARE,LEVL I: ICD-10-PCS | Mod: ,,, | Performed by: INTERNAL MEDICINE

## 2021-06-05 PROCEDURE — 83605 ASSAY OF LACTIC ACID: CPT

## 2021-06-05 PROCEDURE — 63600175 PHARM REV CODE 636 W HCPCS: Performed by: STUDENT IN AN ORGANIZED HEALTH CARE EDUCATION/TRAINING PROGRAM

## 2021-06-05 PROCEDURE — 85730 THROMBOPLASTIN TIME PARTIAL: CPT | Performed by: STUDENT IN AN ORGANIZED HEALTH CARE EDUCATION/TRAINING PROGRAM

## 2021-06-05 PROCEDURE — 85025 COMPLETE CBC W/AUTO DIFF WBC: CPT | Performed by: STUDENT IN AN ORGANIZED HEALTH CARE EDUCATION/TRAINING PROGRAM

## 2021-06-05 PROCEDURE — 80053 COMPREHEN METABOLIC PANEL: CPT | Performed by: STUDENT IN AN ORGANIZED HEALTH CARE EDUCATION/TRAINING PROGRAM

## 2021-06-05 PROCEDURE — 85610 PROTHROMBIN TIME: CPT | Mod: 91 | Performed by: INTERNAL MEDICINE

## 2021-06-05 PROCEDURE — 82800 BLOOD PH: CPT

## 2021-06-05 PROCEDURE — 20000000 HC ICU ROOM

## 2021-06-05 PROCEDURE — 94761 N-INVAS EAR/PLS OXIMETRY MLT: CPT

## 2021-06-05 RX ORDER — FUROSEMIDE 10 MG/ML
80 INJECTION INTRAMUSCULAR; INTRAVENOUS DAILY
Status: DISCONTINUED | OUTPATIENT
Start: 2021-06-06 | End: 2021-06-06

## 2021-06-05 RX ORDER — METOPROLOL SUCCINATE 50 MG/1
50 TABLET, EXTENDED RELEASE ORAL DAILY
Status: DISCONTINUED | OUTPATIENT
Start: 2021-06-06 | End: 2021-06-16 | Stop reason: HOSPADM

## 2021-06-05 RX ADMIN — ASPIRIN 81 MG: 81 TABLET, COATED ORAL at 09:06

## 2021-06-05 RX ADMIN — HEPARIN SODIUM 5000 UNITS: 5000 INJECTION INTRAVENOUS; SUBCUTANEOUS at 04:06

## 2021-06-05 RX ADMIN — POLYETHYLENE GLYCOL 3350 17 G: 17 POWDER, FOR SOLUTION ORAL at 09:06

## 2021-06-05 RX ADMIN — HEPARIN SODIUM 5000 UNITS: 5000 INJECTION INTRAVENOUS; SUBCUTANEOUS at 09:06

## 2021-06-05 RX ADMIN — FUROSEMIDE 80 MG: 10 INJECTION, SOLUTION INTRAMUSCULAR; INTRAVENOUS at 09:06

## 2021-06-05 RX ADMIN — DOCUSATE SODIUM 50 MG: 50 CAPSULE, LIQUID FILLED ORAL at 09:06

## 2021-06-05 RX ADMIN — PANTOPRAZOLE SODIUM 40 MG: 40 TABLET, DELAYED RELEASE ORAL at 09:06

## 2021-06-05 RX ADMIN — HYDROCODONE BITARTRATE AND ACETAMINOPHEN 1 TABLET: 5; 325 TABLET ORAL at 09:06

## 2021-06-05 RX ADMIN — MAGNESIUM SULFATE 2 G: 2 INJECTION INTRAVENOUS at 06:06

## 2021-06-05 RX ADMIN — VANCOMYCIN HYDROCHLORIDE 1000 MG: 1 INJECTION, POWDER, LYOPHILIZED, FOR SOLUTION INTRAVENOUS at 09:06

## 2021-06-05 RX ADMIN — HEPARIN SODIUM 5000 UNITS: 5000 INJECTION INTRAVENOUS; SUBCUTANEOUS at 05:06

## 2021-06-05 RX ADMIN — SODIUM CHLORIDE, SODIUM LACTATE, POTASSIUM CHLORIDE, AND CALCIUM CHLORIDE 250 ML: .6; .31; .03; .02 INJECTION, SOLUTION INTRAVENOUS at 12:06

## 2021-06-05 RX ADMIN — METOPROLOL SUCCINATE 25 MG: 25 TABLET, EXTENDED RELEASE ORAL at 09:06

## 2021-06-06 LAB
ALBUMIN SERPL BCP-MCNC: 2.3 G/DL (ref 3.5–5.2)
ALLENS TEST: ABNORMAL
ALLENS TEST: ABNORMAL
ALP SERPL-CCNC: 130 U/L (ref 55–135)
ALT SERPL W/O P-5'-P-CCNC: 12 U/L (ref 10–44)
ANION GAP SERPL CALC-SCNC: 11 MMOL/L (ref 8–16)
ANION GAP SERPL CALC-SCNC: 9 MMOL/L (ref 8–16)
AST SERPL-CCNC: 18 U/L (ref 10–40)
BASOPHILS # BLD AUTO: 0.09 K/UL (ref 0–0.2)
BASOPHILS NFR BLD: 1.3 % (ref 0–1.9)
BILIRUB SERPL-MCNC: 0.7 MG/DL (ref 0.1–1)
BUN SERPL-MCNC: 21 MG/DL (ref 8–23)
BUN SERPL-MCNC: 25 MG/DL (ref 8–23)
CALCIUM SERPL-MCNC: 8.4 MG/DL (ref 8.7–10.5)
CALCIUM SERPL-MCNC: 8.6 MG/DL (ref 8.7–10.5)
CHLORIDE SERPL-SCNC: 100 MMOL/L (ref 95–110)
CHLORIDE SERPL-SCNC: 101 MMOL/L (ref 95–110)
CO2 SERPL-SCNC: 19 MMOL/L (ref 23–29)
CO2 SERPL-SCNC: 24 MMOL/L (ref 23–29)
CREAT SERPL-MCNC: 1.3 MG/DL (ref 0.5–1.4)
CREAT SERPL-MCNC: 1.4 MG/DL (ref 0.5–1.4)
DELSYS: ABNORMAL
DELSYS: ABNORMAL
DIFFERENTIAL METHOD: ABNORMAL
EOSINOPHIL # BLD AUTO: 0.1 K/UL (ref 0–0.5)
EOSINOPHIL NFR BLD: 1.8 % (ref 0–8)
ERYTHROCYTE [DISTWIDTH] IN BLOOD BY AUTOMATED COUNT: 15.6 % (ref 11.5–14.5)
EST. GFR  (AFRICAN AMERICAN): 57.2 ML/MIN/1.73 M^2
EST. GFR  (AFRICAN AMERICAN): >60 ML/MIN/1.73 M^2
EST. GFR  (NON AFRICAN AMERICAN): 49.5 ML/MIN/1.73 M^2
EST. GFR  (NON AFRICAN AMERICAN): 54.1 ML/MIN/1.73 M^2
FIBRINOGEN PPP-MCNC: 602 MG/DL (ref 182–400)
GLUCOSE SERPL-MCNC: 144 MG/DL (ref 70–110)
GLUCOSE SERPL-MCNC: 84 MG/DL (ref 70–110)
HCO3 UR-SCNC: 24.6 MMOL/L (ref 24–28)
HCO3 UR-SCNC: 26.9 MMOL/L (ref 24–28)
HCT VFR BLD AUTO: 26.2 % (ref 40–54)
HGB BLD-MCNC: 8.5 G/DL (ref 14–18)
IMM GRANULOCYTES # BLD AUTO: 0.03 K/UL (ref 0–0.04)
IMM GRANULOCYTES NFR BLD AUTO: 0.4 % (ref 0–0.5)
INR PPP: 1.3 (ref 0.8–1.2)
INR PPP: 1.3 (ref 0.8–1.2)
LYMPHOCYTES # BLD AUTO: 1.6 K/UL (ref 1–4.8)
LYMPHOCYTES NFR BLD: 22.5 % (ref 18–48)
MAGNESIUM SERPL-MCNC: 2 MG/DL (ref 1.6–2.6)
MAGNESIUM SERPL-MCNC: 2.2 MG/DL (ref 1.6–2.6)
MCH RBC QN AUTO: 28.3 PG (ref 27–31)
MCHC RBC AUTO-ENTMCNC: 32.4 G/DL (ref 32–36)
MCV RBC AUTO: 87 FL (ref 82–98)
MODE: ABNORMAL
MODE: ABNORMAL
MONOCYTES # BLD AUTO: 0.7 K/UL (ref 0.3–1)
MONOCYTES NFR BLD: 9.7 % (ref 4–15)
NEUTROPHILS # BLD AUTO: 4.6 K/UL (ref 1.8–7.7)
NEUTROPHILS NFR BLD: 64.3 % (ref 38–73)
NRBC BLD-RTO: 0 /100 WBC
PCO2 BLDA: 38.9 MMHG (ref 35–45)
PCO2 BLDA: 42 MMHG (ref 35–45)
PH SMN: 7.41 [PH] (ref 7.35–7.45)
PH SMN: 7.42 [PH] (ref 7.35–7.45)
PHOSPHATE SERPL-MCNC: 3.8 MG/DL (ref 2.7–4.5)
PLATELET # BLD AUTO: 220 K/UL (ref 150–450)
PMV BLD AUTO: 9.5 FL (ref 9.2–12.9)
PO2 BLDA: 29 MMHG (ref 40–60)
PO2 BLDA: 30 MMHG (ref 40–60)
POC BE: 0 MMOL/L
POC BE: 2 MMOL/L
POC SATURATED O2: 57 % (ref 95–100)
POC SATURATED O2: 58 % (ref 95–100)
POC TCO2: 26 MMOL/L (ref 24–29)
POC TCO2: 28 MMOL/L (ref 24–29)
POCT GLUCOSE: 120 MG/DL (ref 70–110)
POCT GLUCOSE: 162 MG/DL (ref 70–110)
POCT GLUCOSE: 82 MG/DL (ref 70–110)
POTASSIUM SERPL-SCNC: 4.4 MMOL/L (ref 3.5–5.1)
POTASSIUM SERPL-SCNC: 4.5 MMOL/L (ref 3.5–5.1)
PROT SERPL-MCNC: 6 G/DL (ref 6–8.4)
PROTHROMBIN TIME: 13.7 SEC (ref 9–12.5)
PROTHROMBIN TIME: 13.9 SEC (ref 9–12.5)
RBC # BLD AUTO: 3 M/UL (ref 4.6–6.2)
SAMPLE: ABNORMAL
SAMPLE: ABNORMAL
SARS-COV-2 RDRP RESP QL NAA+PROBE: NEGATIVE
SITE: ABNORMAL
SITE: ABNORMAL
SODIUM SERPL-SCNC: 130 MMOL/L (ref 136–145)
SODIUM SERPL-SCNC: 134 MMOL/L (ref 136–145)
WBC # BLD AUTO: 7.1 K/UL (ref 3.9–12.7)

## 2021-06-06 PROCEDURE — 99233 PR SUBSEQUENT HOSPITAL CARE,LEVL III: ICD-10-PCS | Mod: ,,, | Performed by: INTERNAL MEDICINE

## 2021-06-06 PROCEDURE — 80053 COMPREHEN METABOLIC PANEL: CPT | Performed by: STUDENT IN AN ORGANIZED HEALTH CARE EDUCATION/TRAINING PROGRAM

## 2021-06-06 PROCEDURE — 99231 SBSQ HOSP IP/OBS SF/LOW 25: CPT | Mod: ,,, | Performed by: INTERNAL MEDICINE

## 2021-06-06 PROCEDURE — 25000003 PHARM REV CODE 250: Performed by: STUDENT IN AN ORGANIZED HEALTH CARE EDUCATION/TRAINING PROGRAM

## 2021-06-06 PROCEDURE — 63600175 PHARM REV CODE 636 W HCPCS: Performed by: STUDENT IN AN ORGANIZED HEALTH CARE EDUCATION/TRAINING PROGRAM

## 2021-06-06 PROCEDURE — 85610 PROTHROMBIN TIME: CPT | Performed by: INTERNAL MEDICINE

## 2021-06-06 PROCEDURE — 85025 COMPLETE CBC W/AUTO DIFF WBC: CPT | Performed by: STUDENT IN AN ORGANIZED HEALTH CARE EDUCATION/TRAINING PROGRAM

## 2021-06-06 PROCEDURE — 80048 BASIC METABOLIC PNL TOTAL CA: CPT | Performed by: INTERNAL MEDICINE

## 2021-06-06 PROCEDURE — 85384 FIBRINOGEN ACTIVITY: CPT | Performed by: EMERGENCY MEDICINE

## 2021-06-06 PROCEDURE — 83735 ASSAY OF MAGNESIUM: CPT | Mod: 91 | Performed by: INTERNAL MEDICINE

## 2021-06-06 PROCEDURE — U0002 COVID-19 LAB TEST NON-CDC: HCPCS | Performed by: INTERNAL MEDICINE

## 2021-06-06 PROCEDURE — 86920 COMPATIBILITY TEST SPIN: CPT | Performed by: INTERNAL MEDICINE

## 2021-06-06 PROCEDURE — 99233 SBSQ HOSP IP/OBS HIGH 50: CPT | Mod: ,,, | Performed by: INTERNAL MEDICINE

## 2021-06-06 PROCEDURE — 99231 PR SUBSEQUENT HOSPITAL CARE,LEVL I: ICD-10-PCS | Mod: ,,, | Performed by: INTERNAL MEDICINE

## 2021-06-06 PROCEDURE — 84100 ASSAY OF PHOSPHORUS: CPT | Performed by: STUDENT IN AN ORGANIZED HEALTH CARE EDUCATION/TRAINING PROGRAM

## 2021-06-06 PROCEDURE — 20000000 HC ICU ROOM

## 2021-06-06 PROCEDURE — 99900035 HC TECH TIME PER 15 MIN (STAT)

## 2021-06-06 RX ORDER — LOSARTAN POTASSIUM 25 MG/1
25 TABLET ORAL NIGHTLY
Status: DISCONTINUED | OUTPATIENT
Start: 2021-06-06 | End: 2021-06-16 | Stop reason: HOSPADM

## 2021-06-06 RX ORDER — FUROSEMIDE 40 MG/1
40 TABLET ORAL 2 TIMES DAILY
Status: DISCONTINUED | OUTPATIENT
Start: 2021-06-07 | End: 2021-06-07

## 2021-06-06 RX ADMIN — VANCOMYCIN HYDROCHLORIDE 1000 MG: 1 INJECTION, POWDER, LYOPHILIZED, FOR SOLUTION INTRAVENOUS at 08:06

## 2021-06-06 RX ADMIN — METOPROLOL SUCCINATE 50 MG: 50 TABLET, EXTENDED RELEASE ORAL at 09:06

## 2021-06-06 RX ADMIN — FUROSEMIDE 80 MG: 10 INJECTION, SOLUTION INTRAMUSCULAR; INTRAVENOUS at 09:06

## 2021-06-06 RX ADMIN — DOCUSATE SODIUM 50 MG: 50 CAPSULE, LIQUID FILLED ORAL at 09:06

## 2021-06-06 RX ADMIN — HYDROCODONE BITARTRATE AND ACETAMINOPHEN 1 TABLET: 5; 325 TABLET ORAL at 08:06

## 2021-06-06 RX ADMIN — HEPARIN SODIUM 5000 UNITS: 5000 INJECTION INTRAVENOUS; SUBCUTANEOUS at 04:06

## 2021-06-06 RX ADMIN — HEPARIN SODIUM 5000 UNITS: 5000 INJECTION INTRAVENOUS; SUBCUTANEOUS at 10:06

## 2021-06-06 RX ADMIN — LOSARTAN POTASSIUM 25 MG: 25 TABLET, FILM COATED ORAL at 08:06

## 2021-06-06 RX ADMIN — ASPIRIN 81 MG: 81 TABLET, COATED ORAL at 09:06

## 2021-06-06 RX ADMIN — HYDROCODONE BITARTRATE AND ACETAMINOPHEN 1 TABLET: 5; 325 TABLET ORAL at 09:06

## 2021-06-06 RX ADMIN — INSULIN ASPART 2 UNITS: 100 INJECTION, SOLUTION INTRAVENOUS; SUBCUTANEOUS at 04:06

## 2021-06-06 RX ADMIN — PANTOPRAZOLE SODIUM 40 MG: 40 TABLET, DELAYED RELEASE ORAL at 09:06

## 2021-06-06 RX ADMIN — HEPARIN SODIUM 5000 UNITS: 5000 INJECTION INTRAVENOUS; SUBCUTANEOUS at 06:06

## 2021-06-07 LAB
ABO + RH BLD: NORMAL
ALLENS TEST: ABNORMAL
ANION GAP SERPL CALC-SCNC: 10 MMOL/L (ref 8–16)
ANION GAP SERPL CALC-SCNC: 11 MMOL/L (ref 8–16)
BASOPHILS # BLD AUTO: 0.09 K/UL (ref 0–0.2)
BASOPHILS NFR BLD: 1.3 % (ref 0–1.9)
BLD GP AB SCN CELLS X3 SERPL QL: NORMAL
BUN SERPL-MCNC: 21 MG/DL (ref 8–23)
BUN SERPL-MCNC: 25 MG/DL (ref 8–23)
CALCIUM SERPL-MCNC: 8.1 MG/DL (ref 8.7–10.5)
CALCIUM SERPL-MCNC: 8.6 MG/DL (ref 8.7–10.5)
CHLORIDE SERPL-SCNC: 101 MMOL/L (ref 95–110)
CHLORIDE SERPL-SCNC: 99 MMOL/L (ref 95–110)
CO2 SERPL-SCNC: 19 MMOL/L (ref 23–29)
CO2 SERPL-SCNC: 21 MMOL/L (ref 23–29)
CREAT SERPL-MCNC: 1.3 MG/DL (ref 0.5–1.4)
CREAT SERPL-MCNC: 1.7 MG/DL (ref 0.5–1.4)
DELSYS: ABNORMAL
DIFFERENTIAL METHOD: ABNORMAL
EOSINOPHIL # BLD AUTO: 0.1 K/UL (ref 0–0.5)
EOSINOPHIL NFR BLD: 1.7 % (ref 0–8)
ERYTHROCYTE [DISTWIDTH] IN BLOOD BY AUTOMATED COUNT: 15.6 % (ref 11.5–14.5)
EST. GFR  (AFRICAN AMERICAN): 45.2 ML/MIN/1.73 M^2
EST. GFR  (AFRICAN AMERICAN): >60 ML/MIN/1.73 M^2
EST. GFR  (NON AFRICAN AMERICAN): 39.1 ML/MIN/1.73 M^2
EST. GFR  (NON AFRICAN AMERICAN): 54.1 ML/MIN/1.73 M^2
GLUCOSE SERPL-MCNC: 118 MG/DL (ref 70–110)
GLUCOSE SERPL-MCNC: 95 MG/DL (ref 70–110)
HCO3 UR-SCNC: 25.3 MMOL/L (ref 24–28)
HCT VFR BLD AUTO: 25.9 % (ref 40–54)
HGB BLD-MCNC: 8.3 G/DL (ref 14–18)
IMM GRANULOCYTES # BLD AUTO: 0.03 K/UL (ref 0–0.04)
IMM GRANULOCYTES NFR BLD AUTO: 0.4 % (ref 0–0.5)
INR PPP: 1.2 (ref 0.8–1.2)
INR PPP: 1.2 (ref 0.8–1.2)
LYMPHOCYTES # BLD AUTO: 1.6 K/UL (ref 1–4.8)
LYMPHOCYTES NFR BLD: 23 % (ref 18–48)
MAGNESIUM SERPL-MCNC: 1.9 MG/DL (ref 1.6–2.6)
MAGNESIUM SERPL-MCNC: 2 MG/DL (ref 1.6–2.6)
MCH RBC QN AUTO: 28.1 PG (ref 27–31)
MCHC RBC AUTO-ENTMCNC: 32 G/DL (ref 32–36)
MCV RBC AUTO: 88 FL (ref 82–98)
MONOCYTES # BLD AUTO: 0.7 K/UL (ref 0.3–1)
MONOCYTES NFR BLD: 9.6 % (ref 4–15)
NEUTROPHILS # BLD AUTO: 4.4 K/UL (ref 1.8–7.7)
NEUTROPHILS NFR BLD: 64 % (ref 38–73)
NRBC BLD-RTO: 0 /100 WBC
PCO2 BLDA: 41 MMHG (ref 35–45)
PH SMN: 7.4 [PH] (ref 7.35–7.45)
PHOSPHATE SERPL-MCNC: 3.7 MG/DL (ref 2.7–4.5)
PLATELET # BLD AUTO: 218 K/UL (ref 150–450)
PMV BLD AUTO: 9.5 FL (ref 9.2–12.9)
PO2 BLDA: 25 MMHG (ref 40–60)
POC BE: 1 MMOL/L
POC SATURATED O2: 44 % (ref 95–100)
POC TCO2: 27 MMOL/L (ref 24–29)
POCT GLUCOSE: 100 MG/DL (ref 70–110)
POCT GLUCOSE: 128 MG/DL (ref 70–110)
POCT GLUCOSE: 160 MG/DL (ref 70–110)
POCT GLUCOSE: 99 MG/DL (ref 70–110)
POTASSIUM SERPL-SCNC: 4.2 MMOL/L (ref 3.5–5.1)
POTASSIUM SERPL-SCNC: 4.6 MMOL/L (ref 3.5–5.1)
PROTHROMBIN TIME: 12.6 SEC (ref 9–12.5)
PROTHROMBIN TIME: 13.1 SEC (ref 9–12.5)
RBC # BLD AUTO: 2.95 M/UL (ref 4.6–6.2)
SAMPLE: ABNORMAL
SITE: ABNORMAL
SODIUM SERPL-SCNC: 129 MMOL/L (ref 136–145)
SODIUM SERPL-SCNC: 132 MMOL/L (ref 136–145)
VANCOMYCIN TROUGH SERPL-MCNC: 21.3 UG/ML (ref 10–22)
WBC # BLD AUTO: 6.87 K/UL (ref 3.9–12.7)

## 2021-06-07 PROCEDURE — 85025 COMPLETE CBC W/AUTO DIFF WBC: CPT | Performed by: STUDENT IN AN ORGANIZED HEALTH CARE EDUCATION/TRAINING PROGRAM

## 2021-06-07 PROCEDURE — 25000003 PHARM REV CODE 250: Performed by: STUDENT IN AN ORGANIZED HEALTH CARE EDUCATION/TRAINING PROGRAM

## 2021-06-07 PROCEDURE — 25000003 PHARM REV CODE 250: Performed by: INTERNAL MEDICINE

## 2021-06-07 PROCEDURE — 99232 PR SUBSEQUENT HOSPITAL CARE,LEVL II: ICD-10-PCS | Mod: ,,, | Performed by: NURSE PRACTITIONER

## 2021-06-07 PROCEDURE — 83735 ASSAY OF MAGNESIUM: CPT | Mod: 91 | Performed by: INTERNAL MEDICINE

## 2021-06-07 PROCEDURE — 20000000 HC ICU ROOM

## 2021-06-07 PROCEDURE — 99233 PR SUBSEQUENT HOSPITAL CARE,LEVL III: ICD-10-PCS | Mod: ,,, | Performed by: INTERNAL MEDICINE

## 2021-06-07 PROCEDURE — 94761 N-INVAS EAR/PLS OXIMETRY MLT: CPT

## 2021-06-07 PROCEDURE — 85610 PROTHROMBIN TIME: CPT | Performed by: INTERNAL MEDICINE

## 2021-06-07 PROCEDURE — 97530 THERAPEUTIC ACTIVITIES: CPT | Mod: CQ

## 2021-06-07 PROCEDURE — 80202 ASSAY OF VANCOMYCIN: CPT | Performed by: INTERNAL MEDICINE

## 2021-06-07 PROCEDURE — 84100 ASSAY OF PHOSPHORUS: CPT | Performed by: STUDENT IN AN ORGANIZED HEALTH CARE EDUCATION/TRAINING PROGRAM

## 2021-06-07 PROCEDURE — 63600175 PHARM REV CODE 636 W HCPCS: Performed by: STUDENT IN AN ORGANIZED HEALTH CARE EDUCATION/TRAINING PROGRAM

## 2021-06-07 PROCEDURE — 99231 SBSQ HOSP IP/OBS SF/LOW 25: CPT | Mod: ,,, | Performed by: INTERNAL MEDICINE

## 2021-06-07 PROCEDURE — 86900 BLOOD TYPING SEROLOGIC ABO: CPT | Performed by: INTERNAL MEDICINE

## 2021-06-07 PROCEDURE — 97116 GAIT TRAINING THERAPY: CPT | Mod: CQ

## 2021-06-07 PROCEDURE — 99900035 HC TECH TIME PER 15 MIN (STAT)

## 2021-06-07 PROCEDURE — 99231 PR SUBSEQUENT HOSPITAL CARE,LEVL I: ICD-10-PCS | Mod: ,,, | Performed by: INTERNAL MEDICINE

## 2021-06-07 PROCEDURE — 97535 SELF CARE MNGMENT TRAINING: CPT

## 2021-06-07 PROCEDURE — 99233 SBSQ HOSP IP/OBS HIGH 50: CPT | Mod: ,,, | Performed by: INTERNAL MEDICINE

## 2021-06-07 PROCEDURE — 99232 SBSQ HOSP IP/OBS MODERATE 35: CPT | Mod: ,,, | Performed by: NURSE PRACTITIONER

## 2021-06-07 PROCEDURE — 80048 BASIC METABOLIC PNL TOTAL CA: CPT | Performed by: INTERNAL MEDICINE

## 2021-06-07 RX ORDER — FUROSEMIDE 80 MG/1
80 TABLET ORAL 2 TIMES DAILY
Status: DISCONTINUED | OUTPATIENT
Start: 2021-06-07 | End: 2021-06-16 | Stop reason: HOSPADM

## 2021-06-07 RX ADMIN — VANCOMYCIN HYDROCHLORIDE 1000 MG: 1 INJECTION, POWDER, LYOPHILIZED, FOR SOLUTION INTRAVENOUS at 08:06

## 2021-06-07 RX ADMIN — HEPARIN SODIUM 5000 UNITS: 5000 INJECTION INTRAVENOUS; SUBCUTANEOUS at 06:06

## 2021-06-07 RX ADMIN — HYDROCODONE BITARTRATE AND ACETAMINOPHEN 1 TABLET: 5; 325 TABLET ORAL at 08:06

## 2021-06-07 RX ADMIN — HYDROCODONE BITARTRATE AND ACETAMINOPHEN 1 TABLET: 5; 325 TABLET ORAL at 12:06

## 2021-06-07 RX ADMIN — INSULIN ASPART 2 UNITS: 100 INJECTION, SOLUTION INTRAVENOUS; SUBCUTANEOUS at 06:06

## 2021-06-07 RX ADMIN — HEPARIN SODIUM 5000 UNITS: 5000 INJECTION INTRAVENOUS; SUBCUTANEOUS at 09:06

## 2021-06-07 RX ADMIN — FUROSEMIDE 40 MG: 40 TABLET ORAL at 08:06

## 2021-06-07 RX ADMIN — SODIUM CHLORIDE 10 ML/HR: 0.9 INJECTION, SOLUTION INTRAVENOUS at 03:06

## 2021-06-07 RX ADMIN — LOSARTAN POTASSIUM 25 MG: 25 TABLET, FILM COATED ORAL at 08:06

## 2021-06-07 RX ADMIN — FUROSEMIDE 80 MG: 80 TABLET ORAL at 05:06

## 2021-06-07 RX ADMIN — METOPROLOL SUCCINATE 50 MG: 50 TABLET, EXTENDED RELEASE ORAL at 08:06

## 2021-06-07 RX ADMIN — SODIUM CHLORIDE 250 ML: 0.9 INJECTION, SOLUTION INTRAVENOUS at 06:06

## 2021-06-07 RX ADMIN — ASPIRIN 81 MG: 81 TABLET, COATED ORAL at 08:06

## 2021-06-07 RX ADMIN — INSULIN ASPART 2 UNITS: 100 INJECTION, SOLUTION INTRAVENOUS; SUBCUTANEOUS at 11:06

## 2021-06-07 RX ADMIN — PANTOPRAZOLE SODIUM 40 MG: 40 TABLET, DELAYED RELEASE ORAL at 08:06

## 2021-06-07 RX ADMIN — HEPARIN SODIUM 5000 UNITS: 5000 INJECTION INTRAVENOUS; SUBCUTANEOUS at 01:06

## 2021-06-08 LAB
ACANTHOCYTES BLD QL SMEAR: ABNORMAL
ALLENS TEST: ABNORMAL
ANION GAP SERPL CALC-SCNC: 11 MMOL/L (ref 8–16)
ANION GAP SERPL CALC-SCNC: 12 MMOL/L (ref 8–16)
ANISOCYTOSIS BLD QL SMEAR: ABNORMAL
AUER BODIES BLD QL SMEAR: ABNORMAL
BASO STIPL BLD QL SMEAR: ABNORMAL
BASOPHILS # BLD AUTO: 0.1 K/UL (ref 0–0.2)
BASOPHILS # BLD AUTO: ABNORMAL K/UL (ref 0–0.2)
BASOPHILS NFR BLD: 1.4 % (ref 0–1.9)
BASOPHILS NFR BLD: ABNORMAL % (ref 0–1.9)
BLASTS NFR BLD MANUAL: ABNORMAL %
BUN SERPL-MCNC: 26 MG/DL (ref 8–23)
BUN SERPL-MCNC: 31 MG/DL (ref 8–23)
BURR CELLS BLD QL SMEAR: ABNORMAL
CABOT RINGS BLD QL SMEAR: ABNORMAL
CALCIUM SERPL-MCNC: 8.4 MG/DL (ref 8.7–10.5)
CALCIUM SERPL-MCNC: 8.7 MG/DL (ref 8.7–10.5)
CHLORIDE SERPL-SCNC: 100 MMOL/L (ref 95–110)
CHLORIDE SERPL-SCNC: 99 MMOL/L (ref 95–110)
CO2 SERPL-SCNC: 19 MMOL/L (ref 23–29)
CO2 SERPL-SCNC: 21 MMOL/L (ref 23–29)
CREAT SERPL-MCNC: 1.5 MG/DL (ref 0.5–1.4)
CREAT SERPL-MCNC: 1.6 MG/DL (ref 0.5–1.4)
DACRYOCYTES BLD QL SMEAR: ABNORMAL
DELSYS: ABNORMAL
DIFFERENTIAL METHOD: ABNORMAL
DIFFERENTIAL METHOD: ABNORMAL
DOHLE BOD BLD QL SMEAR: ABNORMAL
EOSINOPHIL # BLD AUTO: 0.2 K/UL (ref 0–0.5)
EOSINOPHIL # BLD AUTO: ABNORMAL K/UL (ref 0–0.5)
EOSINOPHIL NFR BLD: 2.2 % (ref 0–8)
EOSINOPHIL NFR BLD: ABNORMAL % (ref 0–8)
ERYTHROCYTE [DISTWIDTH] IN BLOOD BY AUTOMATED COUNT: 15.4 % (ref 11.5–14.5)
ERYTHROCYTE [DISTWIDTH] IN BLOOD BY AUTOMATED COUNT: ABNORMAL % (ref 11.5–14.5)
EST. GFR  (AFRICAN AMERICAN): 48.7 ML/MIN/1.73 M^2
EST. GFR  (AFRICAN AMERICAN): 52.6 ML/MIN/1.73 M^2
EST. GFR  (NON AFRICAN AMERICAN): 42.1 ML/MIN/1.73 M^2
EST. GFR  (NON AFRICAN AMERICAN): 45.5 ML/MIN/1.73 M^2
FERRITIN SERPL-MCNC: 414 NG/ML (ref 20–300)
GIANT PLATELETS BLD QL SMEAR: ABNORMAL
GLUCOSE SERPL-MCNC: 164 MG/DL (ref 70–110)
GLUCOSE SERPL-MCNC: 80 MG/DL (ref 70–110)
HCO3 UR-SCNC: 24.5 MMOL/L (ref 24–28)
HCT VFR BLD AUTO: 25.4 % (ref 40–54)
HCT VFR BLD AUTO: ABNORMAL % (ref 40–54)
HEINZ BOD BLD QL SMEAR: ABNORMAL
HGB BLD-MCNC: 8.2 G/DL (ref 14–18)
HGB BLD-MCNC: ABNORMAL G/DL (ref 14–18)
HGB C CRY RBC QL MICRO: ABNORMAL
HOWELL-JOLLY BOD BLD QL SMEAR: ABNORMAL
HYPOCHROMIA BLD QL SMEAR: ABNORMAL
IMM GRANULOCYTES # BLD AUTO: 0.04 K/UL (ref 0–0.04)
IMM GRANULOCYTES # BLD AUTO: ABNORMAL K/UL (ref 0–0.04)
IMM GRANULOCYTES NFR BLD AUTO: 0.6 % (ref 0–0.5)
IMM GRANULOCYTES NFR BLD AUTO: ABNORMAL % (ref 0–0.5)
INR PPP: 1.1 (ref 0.8–1.2)
INR PPP: 1.4 (ref 0.8–1.2)
INR PPP: NORMAL (ref 0.8–1.2)
IRON SERPL-MCNC: 25 UG/DL (ref 45–160)
LYMPHOCYTES # BLD AUTO: 1.8 K/UL (ref 1–4.8)
LYMPHOCYTES # BLD AUTO: ABNORMAL K/UL (ref 1–4.8)
LYMPHOCYTES NFR BLD: 25.1 % (ref 18–48)
LYMPHOCYTES NFR BLD: ABNORMAL % (ref 18–48)
MAGNESIUM SERPL-MCNC: 1.8 MG/DL (ref 1.6–2.6)
MAGNESIUM SERPL-MCNC: 2.2 MG/DL (ref 1.6–2.6)
MCH RBC QN AUTO: 27.7 PG (ref 27–31)
MCH RBC QN AUTO: ABNORMAL PG (ref 27–31)
MCHC RBC AUTO-ENTMCNC: 32.3 G/DL (ref 32–36)
MCHC RBC AUTO-ENTMCNC: ABNORMAL G/DL (ref 32–36)
MCV RBC AUTO: 86 FL (ref 82–98)
MCV RBC AUTO: ABNORMAL FL (ref 82–98)
MEGAKARYOCYTIC FRAGMENTS: ABNORMAL
METAMYELOCYTES NFR BLD MANUAL: ABNORMAL %
MONOCYTES # BLD AUTO: 0.7 K/UL (ref 0.3–1)
MONOCYTES # BLD AUTO: ABNORMAL K/UL (ref 0.3–1)
MONOCYTES NFR BLD: 9.2 % (ref 4–15)
MONOCYTES NFR BLD: ABNORMAL % (ref 4–15)
MYELOCYTES NFR BLD MANUAL: ABNORMAL %
NEUTROPHILS # BLD AUTO: 4.4 K/UL (ref 1.8–7.7)
NEUTROPHILS # BLD AUTO: ABNORMAL K/UL (ref 1.8–7.7)
NEUTROPHILS NFR BLD: 61.5 % (ref 38–73)
NEUTROPHILS NFR BLD: ABNORMAL % (ref 38–73)
NEUTS BAND NFR BLD MANUAL: ABNORMAL %
NRBC BLD-RTO: 0 /100 WBC
NRBC BLD-RTO: ABNORMAL /100 WBC
OVALOCYTES BLD QL SMEAR: ABNORMAL
PAPPENHEIMER BOD BLD QL SMEAR: ABNORMAL
PCO2 BLDA: 39.7 MMHG (ref 35–45)
PH SMN: 7.4 [PH] (ref 7.35–7.45)
PHOSPHATE SERPL-MCNC: 4 MG/DL (ref 2.7–4.5)
PLASMODIUM BLD QL SMEAR: ABNORMAL
PLATELET # BLD AUTO: 208 K/UL (ref 150–450)
PLATELET # BLD AUTO: ABNORMAL K/UL (ref 150–450)
PLATELET BLD QL SMEAR: ABNORMAL
PMV BLD AUTO: 9.5 FL (ref 9.2–12.9)
PMV BLD AUTO: ABNORMAL FL (ref 9.2–12.9)
PO2 BLDA: 30 MMHG (ref 40–60)
POC BE: 0 MMOL/L
POC SATURATED O2: 56 % (ref 95–100)
POC TCO2: 26 MMOL/L (ref 24–29)
POCT GLUCOSE: 155 MG/DL (ref 70–110)
POCT GLUCOSE: 157 MG/DL (ref 70–110)
POCT GLUCOSE: 179 MG/DL (ref 70–110)
POCT GLUCOSE: 95 MG/DL (ref 70–110)
POIKILOCYTOSIS BLD QL SMEAR: ABNORMAL
POLYCHROMASIA BLD QL SMEAR: ABNORMAL
POTASSIUM SERPL-SCNC: 4.3 MMOL/L (ref 3.5–5.1)
POTASSIUM SERPL-SCNC: 4.7 MMOL/L (ref 3.5–5.1)
PROMYELOCYTES NFR BLD MANUAL: ABNORMAL %
PROTHROMBIN TIME: 12.4 SEC (ref 9–12.5)
PROTHROMBIN TIME: 14.6 SEC (ref 9–12.5)
PROTHROMBIN TIME: NORMAL SEC (ref 9–12.5)
RBC # BLD AUTO: 2.96 M/UL (ref 4.6–6.2)
RBC # BLD AUTO: ABNORMAL M/UL (ref 4.6–6.2)
RBC AGGLUT BLD QL: ABNORMAL
RETICS/RBC NFR AUTO: NORMAL % (ref 0.4–2)
ROULEAUX BLD QL SMEAR: ABNORMAL
SAMPLE: ABNORMAL
SATURATED IRON: 9 % (ref 20–50)
SCHISTOCYTES BLD QL SMEAR: ABNORMAL
SCHISTOCYTES BLD QL SMEAR: ABNORMAL
SICKLE CELLS BLD QL SMEAR: ABNORMAL
SITE: ABNORMAL
SMUDGE CELLS BLD QL SMEAR: ABNORMAL
SODIUM SERPL-SCNC: 131 MMOL/L (ref 136–145)
SODIUM SERPL-SCNC: 131 MMOL/L (ref 136–145)
SPHEROCYTES BLD QL SMEAR: ABNORMAL
STOMATOCYTES BLD QL SMEAR: ABNORMAL
TARGETS BLD QL SMEAR: ABNORMAL
TOTAL IRON BINDING CAPACITY: 275 UG/DL (ref 250–450)
TOXIC GRANULES BLD QL SMEAR: ABNORMAL
TRANSFERRIN SERPL-MCNC: 186 MG/DL (ref 200–375)
VANCOMYCIN SERPL-MCNC: 22.1 UG/ML
WBC # BLD AUTO: 7.21 K/UL (ref 3.9–12.7)
WBC # BLD AUTO: ABNORMAL K/UL (ref 3.9–12.7)
WBC NRBC COR # BLD: ABNORMAL K/UL (ref 3.9–12.7)
WBC OTHER NFR BLD MANUAL: ABNORMAL %
WBC TOXIC VACUOLES BLD QL SMEAR: ABNORMAL

## 2021-06-08 PROCEDURE — C1751 CATH, INF, PER/CENT/MIDLINE: HCPCS

## 2021-06-08 PROCEDURE — 83735 ASSAY OF MAGNESIUM: CPT | Mod: 91 | Performed by: INTERNAL MEDICINE

## 2021-06-08 PROCEDURE — 25000003 PHARM REV CODE 250: Performed by: STUDENT IN AN ORGANIZED HEALTH CARE EDUCATION/TRAINING PROGRAM

## 2021-06-08 PROCEDURE — 20000000 HC ICU ROOM

## 2021-06-08 PROCEDURE — 76937 US GUIDE VASCULAR ACCESS: CPT

## 2021-06-08 PROCEDURE — 25000003 PHARM REV CODE 250: Performed by: INTERNAL MEDICINE

## 2021-06-08 PROCEDURE — A4216 STERILE WATER/SALINE, 10 ML: HCPCS | Performed by: INTERNAL MEDICINE

## 2021-06-08 PROCEDURE — 99231 SBSQ HOSP IP/OBS SF/LOW 25: CPT | Mod: ,,, | Performed by: INTERNAL MEDICINE

## 2021-06-08 PROCEDURE — 80202 ASSAY OF VANCOMYCIN: CPT | Performed by: INTERNAL MEDICINE

## 2021-06-08 PROCEDURE — 80048 BASIC METABOLIC PNL TOTAL CA: CPT | Performed by: INTERNAL MEDICINE

## 2021-06-08 PROCEDURE — 82803 BLOOD GASES ANY COMBINATION: CPT

## 2021-06-08 PROCEDURE — 85025 COMPLETE CBC W/AUTO DIFF WBC: CPT | Performed by: INTERNAL MEDICINE

## 2021-06-08 PROCEDURE — 63600175 PHARM REV CODE 636 W HCPCS: Performed by: STUDENT IN AN ORGANIZED HEALTH CARE EDUCATION/TRAINING PROGRAM

## 2021-06-08 PROCEDURE — 94761 N-INVAS EAR/PLS OXIMETRY MLT: CPT

## 2021-06-08 PROCEDURE — 82728 ASSAY OF FERRITIN: CPT | Performed by: INTERNAL MEDICINE

## 2021-06-08 PROCEDURE — 83540 ASSAY OF IRON: CPT | Performed by: INTERNAL MEDICINE

## 2021-06-08 PROCEDURE — 85610 PROTHROMBIN TIME: CPT | Performed by: INTERNAL MEDICINE

## 2021-06-08 PROCEDURE — 85045 AUTOMATED RETICULOCYTE COUNT: CPT | Performed by: STUDENT IN AN ORGANIZED HEALTH CARE EDUCATION/TRAINING PROGRAM

## 2021-06-08 PROCEDURE — 36573 INSJ PICC RS&I 5 YR+: CPT

## 2021-06-08 PROCEDURE — 99900035 HC TECH TIME PER 15 MIN (STAT)

## 2021-06-08 PROCEDURE — 84100 ASSAY OF PHOSPHORUS: CPT | Performed by: INTERNAL MEDICINE

## 2021-06-08 PROCEDURE — 99231 PR SUBSEQUENT HOSPITAL CARE,LEVL I: ICD-10-PCS | Mod: ,,, | Performed by: INTERNAL MEDICINE

## 2021-06-08 RX ORDER — METOPROLOL SUCCINATE 50 MG/1
50 TABLET, EXTENDED RELEASE ORAL DAILY
Qty: 30 TABLET | Refills: 11 | Status: SHIPPED | OUTPATIENT
Start: 2021-06-09 | End: 2021-06-08

## 2021-06-08 RX ORDER — LOSARTAN POTASSIUM 25 MG/1
25 TABLET ORAL NIGHTLY
Qty: 90 TABLET | Refills: 3 | Status: SHIPPED | OUTPATIENT
Start: 2021-06-08 | End: 2021-06-08

## 2021-06-08 RX ORDER — LOSARTAN POTASSIUM 25 MG/1
25 TABLET ORAL NIGHTLY
Qty: 90 TABLET | Refills: 3 | Status: SHIPPED | OUTPATIENT
Start: 2021-06-08 | End: 2022-06-08

## 2021-06-08 RX ORDER — ALLOPURINOL 100 MG/1
200 TABLET ORAL DAILY
Qty: 60 TABLET | Refills: 11 | Status: SHIPPED | OUTPATIENT
Start: 2021-06-08 | End: 2021-06-10 | Stop reason: SDUPTHER

## 2021-06-08 RX ORDER — ATORVASTATIN CALCIUM 40 MG/1
40 TABLET, FILM COATED ORAL NIGHTLY
Qty: 90 TABLET | Refills: 3 | Status: SHIPPED | OUTPATIENT
Start: 2021-06-08 | End: 2021-06-11 | Stop reason: SDUPTHER

## 2021-06-08 RX ORDER — SODIUM CHLORIDE 0.9 % (FLUSH) 0.9 %
10 SYRINGE (ML) INJECTION EVERY 6 HOURS
Status: DISCONTINUED | OUTPATIENT
Start: 2021-06-08 | End: 2021-06-16 | Stop reason: HOSPADM

## 2021-06-08 RX ORDER — FERROUS SULFATE 325(65) MG
325 TABLET, DELAYED RELEASE (ENTERIC COATED) ORAL DAILY
Qty: 30 TABLET | Refills: 11 | Status: SHIPPED | OUTPATIENT
Start: 2021-06-09 | End: 2021-06-08

## 2021-06-08 RX ORDER — FERROUS SULFATE 325(65) MG
325 TABLET, DELAYED RELEASE (ENTERIC COATED) ORAL DAILY
Qty: 30 TABLET | Refills: 11 | Status: SHIPPED | OUTPATIENT
Start: 2021-06-09 | End: 2021-06-10

## 2021-06-08 RX ORDER — FUROSEMIDE 80 MG/1
80 TABLET ORAL 2 TIMES DAILY
Qty: 90 TABLET | Refills: 3 | Status: SHIPPED | OUTPATIENT
Start: 2021-06-08 | End: 2021-09-06

## 2021-06-08 RX ORDER — DOXYCYCLINE HYCLATE 100 MG
100 TABLET ORAL EVERY 12 HOURS
Qty: 30 TABLET | Refills: 11 | Status: SHIPPED | OUTPATIENT
Start: 2021-07-07 | End: 2021-06-10 | Stop reason: SDUPTHER

## 2021-06-08 RX ORDER — FERROUS SULFATE 325(65) MG
325 TABLET, DELAYED RELEASE (ENTERIC COATED) ORAL DAILY
Status: DISCONTINUED | OUTPATIENT
Start: 2021-06-08 | End: 2021-06-11

## 2021-06-08 RX ORDER — PANTOPRAZOLE SODIUM 40 MG/1
40 TABLET, DELAYED RELEASE ORAL DAILY
Qty: 30 TABLET | Refills: 11 | Status: SHIPPED | OUTPATIENT
Start: 2021-06-09 | End: 2021-06-08 | Stop reason: HOSPADM

## 2021-06-08 RX ORDER — ASPIRIN 81 MG/1
81 TABLET ORAL DAILY
Qty: 30 TABLET | Refills: 11 | Status: SHIPPED | OUTPATIENT
Start: 2021-06-08 | End: 2021-06-08 | Stop reason: SDUPTHER

## 2021-06-08 RX ORDER — SODIUM CHLORIDE 0.9 % (FLUSH) 0.9 %
10 SYRINGE (ML) INJECTION
Status: DISCONTINUED | OUTPATIENT
Start: 2021-06-08 | End: 2021-06-16 | Stop reason: HOSPADM

## 2021-06-08 RX ORDER — METOPROLOL SUCCINATE 50 MG/1
50 TABLET, EXTENDED RELEASE ORAL DAILY
Qty: 30 TABLET | Refills: 11 | Status: SHIPPED | OUTPATIENT
Start: 2021-06-09 | End: 2022-06-09

## 2021-06-08 RX ORDER — MAGNESIUM SULFATE HEPTAHYDRATE 40 MG/ML
2 INJECTION, SOLUTION INTRAVENOUS ONCE
Status: DISCONTINUED | OUTPATIENT
Start: 2021-06-08 | End: 2021-06-08

## 2021-06-08 RX ORDER — VANCOMYCIN HCL IN 5 % DEXTROSE 1G/250ML
1000 PLASTIC BAG, INJECTION (ML) INTRAVENOUS
Status: DISCONTINUED | OUTPATIENT
Start: 2021-06-09 | End: 2021-06-09

## 2021-06-08 RX ORDER — ASPIRIN 81 MG/1
81 TABLET ORAL DAILY
Qty: 30 TABLET | Refills: 11 | Status: SHIPPED | OUTPATIENT
Start: 2021-06-08 | End: 2022-06-08

## 2021-06-08 RX ADMIN — HEPARIN SODIUM 5000 UNITS: 5000 INJECTION INTRAVENOUS; SUBCUTANEOUS at 02:06

## 2021-06-08 RX ADMIN — HEPARIN SODIUM 5000 UNITS: 5000 INJECTION INTRAVENOUS; SUBCUTANEOUS at 09:06

## 2021-06-08 RX ADMIN — HEPARIN SODIUM 5000 UNITS: 5000 INJECTION INTRAVENOUS; SUBCUTANEOUS at 05:06

## 2021-06-08 RX ADMIN — FERROUS SULFATE TAB EC 325 MG (65 MG FE EQUIVALENT) 325 MG: 325 (65 FE) TABLET DELAYED RESPONSE at 12:06

## 2021-06-08 RX ADMIN — ASPIRIN 81 MG: 81 TABLET, COATED ORAL at 08:06

## 2021-06-08 RX ADMIN — SODIUM CHLORIDE: 0.9 INJECTION, SOLUTION INTRAVENOUS at 05:06

## 2021-06-08 RX ADMIN — LOSARTAN POTASSIUM 25 MG: 25 TABLET, FILM COATED ORAL at 09:06

## 2021-06-08 RX ADMIN — METOPROLOL SUCCINATE 50 MG: 50 TABLET, EXTENDED RELEASE ORAL at 08:06

## 2021-06-08 RX ADMIN — HYDROCODONE BITARTRATE AND ACETAMINOPHEN 1 TABLET: 5; 325 TABLET ORAL at 08:06

## 2021-06-08 RX ADMIN — PANTOPRAZOLE SODIUM 40 MG: 40 TABLET, DELAYED RELEASE ORAL at 08:06

## 2021-06-08 RX ADMIN — POLYETHYLENE GLYCOL 3350 17 G: 17 POWDER, FOR SOLUTION ORAL at 08:06

## 2021-06-08 RX ADMIN — DOCUSATE SODIUM 50 MG: 50 CAPSULE, LIQUID FILLED ORAL at 08:06

## 2021-06-08 RX ADMIN — FUROSEMIDE 80 MG: 80 TABLET ORAL at 05:06

## 2021-06-08 RX ADMIN — MAGNESIUM SULFATE 2 G: 2 INJECTION INTRAVENOUS at 05:06

## 2021-06-08 RX ADMIN — INSULIN ASPART 2 UNITS: 100 INJECTION, SOLUTION INTRAVENOUS; SUBCUTANEOUS at 10:06

## 2021-06-08 RX ADMIN — Medication 10 ML: at 05:06

## 2021-06-08 RX ADMIN — FUROSEMIDE 80 MG: 80 TABLET ORAL at 08:06

## 2021-06-09 LAB
ALLENS TEST: ABNORMAL
ANION GAP SERPL CALC-SCNC: 12 MMOL/L (ref 8–16)
ANION GAP SERPL CALC-SCNC: 9 MMOL/L (ref 8–16)
BASOPHILS # BLD AUTO: 0.08 K/UL (ref 0–0.2)
BASOPHILS NFR BLD: 1.2 % (ref 0–1.9)
BUN SERPL-MCNC: 32 MG/DL (ref 8–23)
BUN SERPL-MCNC: 33 MG/DL (ref 8–23)
CALCIUM SERPL-MCNC: 8.3 MG/DL (ref 8.7–10.5)
CALCIUM SERPL-MCNC: 8.4 MG/DL (ref 8.7–10.5)
CHLORIDE SERPL-SCNC: 100 MMOL/L (ref 95–110)
CHLORIDE SERPL-SCNC: 101 MMOL/L (ref 95–110)
CO2 SERPL-SCNC: 19 MMOL/L (ref 23–29)
CO2 SERPL-SCNC: 22 MMOL/L (ref 23–29)
CREAT SERPL-MCNC: 1.5 MG/DL (ref 0.5–1.4)
CREAT SERPL-MCNC: 1.6 MG/DL (ref 0.5–1.4)
DELSYS: ABNORMAL
DIFFERENTIAL METHOD: ABNORMAL
EOSINOPHIL # BLD AUTO: 0.1 K/UL (ref 0–0.5)
EOSINOPHIL NFR BLD: 1.8 % (ref 0–8)
ERYTHROCYTE [DISTWIDTH] IN BLOOD BY AUTOMATED COUNT: 15.4 % (ref 11.5–14.5)
EST. GFR  (AFRICAN AMERICAN): 48.7 ML/MIN/1.73 M^2
EST. GFR  (AFRICAN AMERICAN): 52.6 ML/MIN/1.73 M^2
EST. GFR  (NON AFRICAN AMERICAN): 42.1 ML/MIN/1.73 M^2
EST. GFR  (NON AFRICAN AMERICAN): 45.5 ML/MIN/1.73 M^2
GLUCOSE SERPL-MCNC: 137 MG/DL (ref 70–110)
GLUCOSE SERPL-MCNC: 81 MG/DL (ref 70–110)
HCO3 UR-SCNC: 24.3 MMOL/L (ref 24–28)
HCT VFR BLD AUTO: 24 % (ref 40–54)
HGB BLD-MCNC: 7.8 G/DL (ref 14–18)
IMM GRANULOCYTES # BLD AUTO: 0.03 K/UL (ref 0–0.04)
IMM GRANULOCYTES NFR BLD AUTO: 0.4 % (ref 0–0.5)
INR PPP: 1 (ref 0.8–1.2)
INR PPP: 1.1 (ref 0.8–1.2)
LYMPHOCYTES # BLD AUTO: 1.6 K/UL (ref 1–4.8)
LYMPHOCYTES NFR BLD: 22.9 % (ref 18–48)
MAGNESIUM SERPL-MCNC: 2.1 MG/DL (ref 1.6–2.6)
MAGNESIUM SERPL-MCNC: 2.2 MG/DL (ref 1.6–2.6)
MCH RBC QN AUTO: 27.7 PG (ref 27–31)
MCHC RBC AUTO-ENTMCNC: 32.5 G/DL (ref 32–36)
MCV RBC AUTO: 85 FL (ref 82–98)
MONOCYTES # BLD AUTO: 0.6 K/UL (ref 0.3–1)
MONOCYTES NFR BLD: 9.3 % (ref 4–15)
NEUTROPHILS # BLD AUTO: 4.4 K/UL (ref 1.8–7.7)
NEUTROPHILS NFR BLD: 64.4 % (ref 38–73)
NRBC BLD-RTO: 0 /100 WBC
PCO2 BLDA: 39.8 MMHG (ref 35–45)
PH SMN: 7.39 [PH] (ref 7.35–7.45)
PHOSPHATE SERPL-MCNC: 3.6 MG/DL (ref 2.7–4.5)
PLATELET # BLD AUTO: 207 K/UL (ref 150–450)
PMV BLD AUTO: 9.8 FL (ref 9.2–12.9)
PO2 BLDA: 32 MMHG (ref 40–60)
POC BE: -1 MMOL/L
POC SATURATED O2: 62 % (ref 95–100)
POC TCO2: 26 MMOL/L (ref 24–29)
POCT GLUCOSE: 145 MG/DL (ref 70–110)
POCT GLUCOSE: 164 MG/DL (ref 70–110)
POCT GLUCOSE: 94 MG/DL (ref 70–110)
POTASSIUM SERPL-SCNC: 4.5 MMOL/L (ref 3.5–5.1)
POTASSIUM SERPL-SCNC: 4.6 MMOL/L (ref 3.5–5.1)
PROTHROMBIN TIME: 11.4 SEC (ref 9–12.5)
PROTHROMBIN TIME: 11.8 SEC (ref 9–12.5)
RBC # BLD AUTO: 2.82 M/UL (ref 4.6–6.2)
SAMPLE: ABNORMAL
SITE: ABNORMAL
SODIUM SERPL-SCNC: 131 MMOL/L (ref 136–145)
SODIUM SERPL-SCNC: 132 MMOL/L (ref 136–145)
WBC # BLD AUTO: 6.8 K/UL (ref 3.9–12.7)

## 2021-06-09 PROCEDURE — 82803 BLOOD GASES ANY COMBINATION: CPT

## 2021-06-09 PROCEDURE — 25000003 PHARM REV CODE 250: Performed by: STUDENT IN AN ORGANIZED HEALTH CARE EDUCATION/TRAINING PROGRAM

## 2021-06-09 PROCEDURE — 84100 ASSAY OF PHOSPHORUS: CPT | Performed by: STUDENT IN AN ORGANIZED HEALTH CARE EDUCATION/TRAINING PROGRAM

## 2021-06-09 PROCEDURE — 83735 ASSAY OF MAGNESIUM: CPT | Mod: 91 | Performed by: INTERNAL MEDICINE

## 2021-06-09 PROCEDURE — 63600175 PHARM REV CODE 636 W HCPCS: Performed by: INTERNAL MEDICINE

## 2021-06-09 PROCEDURE — 80048 BASIC METABOLIC PNL TOTAL CA: CPT | Mod: 91 | Performed by: INTERNAL MEDICINE

## 2021-06-09 PROCEDURE — 82668 ASSAY OF ERYTHROPOIETIN: CPT | Performed by: STUDENT IN AN ORGANIZED HEALTH CARE EDUCATION/TRAINING PROGRAM

## 2021-06-09 PROCEDURE — 99231 SBSQ HOSP IP/OBS SF/LOW 25: CPT | Mod: ,,, | Performed by: INTERNAL MEDICINE

## 2021-06-09 PROCEDURE — 97116 GAIT TRAINING THERAPY: CPT | Mod: CQ

## 2021-06-09 PROCEDURE — 94761 N-INVAS EAR/PLS OXIMETRY MLT: CPT

## 2021-06-09 PROCEDURE — 63600175 PHARM REV CODE 636 W HCPCS: Performed by: STUDENT IN AN ORGANIZED HEALTH CARE EDUCATION/TRAINING PROGRAM

## 2021-06-09 PROCEDURE — 85025 COMPLETE CBC W/AUTO DIFF WBC: CPT | Performed by: STUDENT IN AN ORGANIZED HEALTH CARE EDUCATION/TRAINING PROGRAM

## 2021-06-09 PROCEDURE — 25000003 PHARM REV CODE 250: Performed by: INTERNAL MEDICINE

## 2021-06-09 PROCEDURE — 97530 THERAPEUTIC ACTIVITIES: CPT | Mod: CQ

## 2021-06-09 PROCEDURE — 85610 PROTHROMBIN TIME: CPT | Mod: 91 | Performed by: INTERNAL MEDICINE

## 2021-06-09 PROCEDURE — 97535 SELF CARE MNGMENT TRAINING: CPT

## 2021-06-09 PROCEDURE — 99231 PR SUBSEQUENT HOSPITAL CARE,LEVL I: ICD-10-PCS | Mod: ,,, | Performed by: INTERNAL MEDICINE

## 2021-06-09 PROCEDURE — 20000000 HC ICU ROOM

## 2021-06-09 PROCEDURE — A4216 STERILE WATER/SALINE, 10 ML: HCPCS | Performed by: INTERNAL MEDICINE

## 2021-06-09 PROCEDURE — 99900035 HC TECH TIME PER 15 MIN (STAT)

## 2021-06-09 RX ORDER — ATORVASTATIN CALCIUM 20 MG/1
40 TABLET, FILM COATED ORAL DAILY
Status: DISCONTINUED | OUTPATIENT
Start: 2021-06-09 | End: 2021-06-16 | Stop reason: HOSPADM

## 2021-06-09 RX ADMIN — Medication 10 ML: at 12:06

## 2021-06-09 RX ADMIN — DOCUSATE SODIUM 50 MG: 50 CAPSULE, LIQUID FILLED ORAL at 09:06

## 2021-06-09 RX ADMIN — FUROSEMIDE 80 MG: 80 TABLET ORAL at 05:06

## 2021-06-09 RX ADMIN — ATORVASTATIN CALCIUM 40 MG: 20 TABLET, FILM COATED ORAL at 01:06

## 2021-06-09 RX ADMIN — ASPIRIN 81 MG: 81 TABLET, COATED ORAL at 09:06

## 2021-06-09 RX ADMIN — SODIUM CHLORIDE 500 ML: 0.9 INJECTION, SOLUTION INTRAVENOUS at 03:06

## 2021-06-09 RX ADMIN — POLYETHYLENE GLYCOL 3350 17 G: 17 POWDER, FOR SOLUTION ORAL at 09:06

## 2021-06-09 RX ADMIN — HEPARIN SODIUM 5000 UNITS: 5000 INJECTION INTRAVENOUS; SUBCUTANEOUS at 05:06

## 2021-06-09 RX ADMIN — HYDROCODONE BITARTRATE AND ACETAMINOPHEN 1 TABLET: 5; 325 TABLET ORAL at 09:06

## 2021-06-09 RX ADMIN — HEPARIN SODIUM 5000 UNITS: 5000 INJECTION INTRAVENOUS; SUBCUTANEOUS at 01:06

## 2021-06-09 RX ADMIN — Medication 10 ML: at 05:06

## 2021-06-09 RX ADMIN — LOSARTAN POTASSIUM 25 MG: 25 TABLET, FILM COATED ORAL at 08:06

## 2021-06-09 RX ADMIN — FUROSEMIDE 80 MG: 80 TABLET ORAL at 09:06

## 2021-06-09 RX ADMIN — HYDROCODONE BITARTRATE AND ACETAMINOPHEN 1 TABLET: 5; 325 TABLET ORAL at 12:06

## 2021-06-09 RX ADMIN — Medication 10 ML: at 06:06

## 2021-06-09 RX ADMIN — HYDROCODONE BITARTRATE AND ACETAMINOPHEN 1 TABLET: 5; 325 TABLET ORAL at 01:06

## 2021-06-09 RX ADMIN — Medication 10 ML: at 01:06

## 2021-06-09 RX ADMIN — PANTOPRAZOLE SODIUM 40 MG: 40 TABLET, DELAYED RELEASE ORAL at 09:06

## 2021-06-09 RX ADMIN — HEPARIN SODIUM 5000 UNITS: 5000 INJECTION INTRAVENOUS; SUBCUTANEOUS at 09:06

## 2021-06-09 RX ADMIN — FERROUS SULFATE TAB EC 325 MG (65 MG FE EQUIVALENT) 325 MG: 325 (65 FE) TABLET DELAYED RESPONSE at 09:06

## 2021-06-09 RX ADMIN — VANCOMYCIN HYDROCHLORIDE 1000 MG: 1 INJECTION, POWDER, LYOPHILIZED, FOR SOLUTION INTRAVENOUS at 02:06

## 2021-06-09 RX ADMIN — METOPROLOL SUCCINATE 50 MG: 50 TABLET, EXTENDED RELEASE ORAL at 09:06

## 2021-06-10 PROBLEM — Z86.79 S/P MAZE OPERATION FOR ATRIAL FIBRILLATION: Chronic | Status: ACTIVE | Noted: 2021-04-01

## 2021-06-10 PROBLEM — Z95.2 S/P AVR (AORTIC VALVE REPLACEMENT): Chronic | Status: ACTIVE | Noted: 2021-04-01

## 2021-06-10 PROBLEM — G47.33 OBSTRUCTIVE SLEEP APNEA: Chronic | Status: ACTIVE | Noted: 2020-01-15

## 2021-06-10 PROBLEM — I50.22 CHRONIC SYSTOLIC HEART FAILURE: Chronic | Status: ACTIVE | Noted: 2021-05-29

## 2021-06-10 PROBLEM — Z98.890 S/P MAZE OPERATION FOR ATRIAL FIBRILLATION: Chronic | Status: ACTIVE | Noted: 2021-04-01

## 2021-06-10 PROBLEM — Z95.1 S/P CABG (CORONARY ARTERY BYPASS GRAFT): Chronic | Status: ACTIVE | Noted: 2021-03-28

## 2021-06-10 PROBLEM — E11.9 TYPE 2 DIABETES MELLITUS WITHOUT COMPLICATION, WITHOUT LONG-TERM CURRENT USE OF INSULIN: Chronic | Status: ACTIVE | Noted: 2020-01-15

## 2021-06-10 PROBLEM — I48.0 PAROXYSMAL ATRIAL FIBRILLATION: Chronic | Status: ACTIVE | Noted: 2021-05-02

## 2021-06-10 PROBLEM — Z98.890 S/P MVR (MITRAL VALVE REPAIR): Chronic | Status: ACTIVE | Noted: 2021-04-01

## 2021-06-10 PROBLEM — I50.9 ACUTE DECOMPENSATED HEART FAILURE: Status: RESOLVED | Noted: 2021-05-01 | Resolved: 2021-06-10

## 2021-06-10 PROBLEM — D69.6 THROMBOCYTOPENIA: Status: RESOLVED | Noted: 2021-04-01 | Resolved: 2021-06-10

## 2021-06-10 PROBLEM — Z98.890 S/P TVR (TRICUSPID VALVE REPAIR): Chronic | Status: ACTIVE | Noted: 2021-04-01

## 2021-06-10 PROBLEM — E78.5 HYPERLIPIDEMIA: Chronic | Status: ACTIVE | Noted: 2020-01-15

## 2021-06-10 PROBLEM — I25.119 CORONARY ARTERY DISEASE INVOLVING NATIVE CORONARY ARTERY OF NATIVE HEART WITH ANGINA PECTORIS: Chronic | Status: ACTIVE | Noted: 2020-01-15

## 2021-06-10 PROBLEM — R07.9 CHEST PAIN: Status: RESOLVED | Noted: 2021-06-04 | Resolved: 2021-06-10

## 2021-06-10 LAB
ANION GAP SERPL CALC-SCNC: 10 MMOL/L (ref 8–16)
BASOPHILS # BLD AUTO: 0.09 K/UL (ref 0–0.2)
BASOPHILS NFR BLD: 1.4 % (ref 0–1.9)
BUN SERPL-MCNC: 30 MG/DL (ref 8–23)
CALCIUM SERPL-MCNC: 8.4 MG/DL (ref 8.7–10.5)
CHLORIDE SERPL-SCNC: 102 MMOL/L (ref 95–110)
CO2 SERPL-SCNC: 21 MMOL/L (ref 23–29)
CREAT SERPL-MCNC: 1.4 MG/DL (ref 0.5–1.4)
DIFFERENTIAL METHOD: ABNORMAL
EOSINOPHIL # BLD AUTO: 0.1 K/UL (ref 0–0.5)
EOSINOPHIL NFR BLD: 2.1 % (ref 0–8)
ERYTHROCYTE [DISTWIDTH] IN BLOOD BY AUTOMATED COUNT: 15.5 % (ref 11.5–14.5)
EST. GFR  (AFRICAN AMERICAN): 57.2 ML/MIN/1.73 M^2
EST. GFR  (NON AFRICAN AMERICAN): 49.5 ML/MIN/1.73 M^2
GLUCOSE SERPL-MCNC: 86 MG/DL (ref 70–110)
HCT VFR BLD AUTO: 24 % (ref 40–54)
HGB BLD-MCNC: 7.6 G/DL (ref 14–18)
IMM GRANULOCYTES # BLD AUTO: 0.03 K/UL (ref 0–0.04)
IMM GRANULOCYTES NFR BLD AUTO: 0.5 % (ref 0–0.5)
INR PPP: 1 (ref 0.8–1.2)
LYMPHOCYTES # BLD AUTO: 1.8 K/UL (ref 1–4.8)
LYMPHOCYTES NFR BLD: 27.9 % (ref 18–48)
MAGNESIUM SERPL-MCNC: 2 MG/DL (ref 1.6–2.6)
MCH RBC QN AUTO: 27.1 PG (ref 27–31)
MCHC RBC AUTO-ENTMCNC: 31.7 G/DL (ref 32–36)
MCV RBC AUTO: 86 FL (ref 82–98)
MONOCYTES # BLD AUTO: 0.6 K/UL (ref 0.3–1)
MONOCYTES NFR BLD: 9.1 % (ref 4–15)
NEUTROPHILS # BLD AUTO: 3.7 K/UL (ref 1.8–7.7)
NEUTROPHILS NFR BLD: 59 % (ref 38–73)
NRBC BLD-RTO: 0 /100 WBC
PHOSPHATE SERPL-MCNC: 3.3 MG/DL (ref 2.7–4.5)
PLATELET # BLD AUTO: 190 K/UL (ref 150–450)
PMV BLD AUTO: 9.5 FL (ref 9.2–12.9)
POCT GLUCOSE: 130 MG/DL (ref 70–110)
POCT GLUCOSE: 179 MG/DL (ref 70–110)
POTASSIUM SERPL-SCNC: 4.5 MMOL/L (ref 3.5–5.1)
PROTHROMBIN TIME: 11.4 SEC (ref 9–12.5)
RBC # BLD AUTO: 2.8 M/UL (ref 4.6–6.2)
SODIUM SERPL-SCNC: 133 MMOL/L (ref 136–145)
WBC # BLD AUTO: 6.28 K/UL (ref 3.9–12.7)

## 2021-06-10 PROCEDURE — 94761 N-INVAS EAR/PLS OXIMETRY MLT: CPT

## 2021-06-10 PROCEDURE — 85610 PROTHROMBIN TIME: CPT | Performed by: INTERNAL MEDICINE

## 2021-06-10 PROCEDURE — 99231 PR SUBSEQUENT HOSPITAL CARE,LEVL I: ICD-10-PCS | Mod: ,,, | Performed by: INTERNAL MEDICINE

## 2021-06-10 PROCEDURE — 85025 COMPLETE CBC W/AUTO DIFF WBC: CPT | Performed by: STUDENT IN AN ORGANIZED HEALTH CARE EDUCATION/TRAINING PROGRAM

## 2021-06-10 PROCEDURE — 25000003 PHARM REV CODE 250: Performed by: STUDENT IN AN ORGANIZED HEALTH CARE EDUCATION/TRAINING PROGRAM

## 2021-06-10 PROCEDURE — 97530 THERAPEUTIC ACTIVITIES: CPT

## 2021-06-10 PROCEDURE — 83735 ASSAY OF MAGNESIUM: CPT | Performed by: INTERNAL MEDICINE

## 2021-06-10 PROCEDURE — A4216 STERILE WATER/SALINE, 10 ML: HCPCS | Performed by: INTERNAL MEDICINE

## 2021-06-10 PROCEDURE — 25000003 PHARM REV CODE 250: Performed by: INTERNAL MEDICINE

## 2021-06-10 PROCEDURE — 63600175 PHARM REV CODE 636 W HCPCS: Performed by: STUDENT IN AN ORGANIZED HEALTH CARE EDUCATION/TRAINING PROGRAM

## 2021-06-10 PROCEDURE — 84100 ASSAY OF PHOSPHORUS: CPT | Performed by: STUDENT IN AN ORGANIZED HEALTH CARE EDUCATION/TRAINING PROGRAM

## 2021-06-10 PROCEDURE — 80048 BASIC METABOLIC PNL TOTAL CA: CPT | Performed by: INTERNAL MEDICINE

## 2021-06-10 PROCEDURE — 99231 SBSQ HOSP IP/OBS SF/LOW 25: CPT | Mod: ,,, | Performed by: INTERNAL MEDICINE

## 2021-06-10 PROCEDURE — 20000000 HC ICU ROOM

## 2021-06-10 PROCEDURE — P9021 RED BLOOD CELLS UNIT: HCPCS | Performed by: INTERNAL MEDICINE

## 2021-06-10 RX ORDER — ALLOPURINOL 100 MG/1
50 TABLET ORAL DAILY
Qty: 60 TABLET | Refills: 11 | Status: SHIPPED | OUTPATIENT
Start: 2021-06-10 | End: 2021-06-11 | Stop reason: HOSPADM

## 2021-06-10 RX ORDER — DOXYCYCLINE HYCLATE 100 MG
100 TABLET ORAL EVERY 12 HOURS
Qty: 30 TABLET | Refills: 11 | Status: SHIPPED | OUTPATIENT
Start: 2021-07-07 | End: 2021-06-11 | Stop reason: SDUPTHER

## 2021-06-10 RX ORDER — HYDROCODONE BITARTRATE AND ACETAMINOPHEN 500; 5 MG/1; MG/1
TABLET ORAL
Status: DISCONTINUED | OUTPATIENT
Start: 2021-06-10 | End: 2021-06-11

## 2021-06-10 RX ORDER — LORATADINE 10 MG/1
10 TABLET ORAL DAILY PRN
Refills: 0
Start: 2021-06-10

## 2021-06-10 RX ORDER — FUROSEMIDE 10 MG/ML
40 INJECTION INTRAMUSCULAR; INTRAVENOUS ONCE
Status: COMPLETED | OUTPATIENT
Start: 2021-06-10 | End: 2021-06-10

## 2021-06-10 RX ORDER — OMEPRAZOLE 40 MG/1
40 CAPSULE, DELAYED RELEASE ORAL
Qty: 30 CAPSULE | Refills: 11 | Status: SHIPPED | OUTPATIENT
Start: 2021-06-10 | End: 2021-06-11 | Stop reason: SDUPTHER

## 2021-06-10 RX ORDER — FERROUS SULFATE 325(65) MG
TABLET, DELAYED RELEASE (ENTERIC COATED) ORAL
Qty: 30 TABLET | Refills: 11 | Status: SHIPPED | OUTPATIENT
Start: 2021-06-10 | End: 2021-06-11 | Stop reason: HOSPADM

## 2021-06-10 RX ADMIN — FUROSEMIDE 80 MG: 80 TABLET ORAL at 08:06

## 2021-06-10 RX ADMIN — Medication 10 ML: at 11:06

## 2021-06-10 RX ADMIN — HYDROCODONE BITARTRATE AND ACETAMINOPHEN 1 TABLET: 5; 325 TABLET ORAL at 10:06

## 2021-06-10 RX ADMIN — FERROUS SULFATE TAB EC 325 MG (65 MG FE EQUIVALENT) 325 MG: 325 (65 FE) TABLET DELAYED RESPONSE at 08:06

## 2021-06-10 RX ADMIN — FUROSEMIDE 80 MG: 80 TABLET ORAL at 05:06

## 2021-06-10 RX ADMIN — POLYETHYLENE GLYCOL 3350 17 G: 17 POWDER, FOR SOLUTION ORAL at 08:06

## 2021-06-10 RX ADMIN — LOSARTAN POTASSIUM 25 MG: 25 TABLET, FILM COATED ORAL at 08:06

## 2021-06-10 RX ADMIN — HEPARIN SODIUM 5000 UNITS: 5000 INJECTION INTRAVENOUS; SUBCUTANEOUS at 09:06

## 2021-06-10 RX ADMIN — Medication 10 ML: at 06:06

## 2021-06-10 RX ADMIN — HEPARIN SODIUM 5000 UNITS: 5000 INJECTION INTRAVENOUS; SUBCUTANEOUS at 02:06

## 2021-06-10 RX ADMIN — HEPARIN SODIUM 5000 UNITS: 5000 INJECTION INTRAVENOUS; SUBCUTANEOUS at 05:06

## 2021-06-10 RX ADMIN — Medication 10 ML: at 12:06

## 2021-06-10 RX ADMIN — HYDROCODONE BITARTRATE AND ACETAMINOPHEN 1 TABLET: 5; 325 TABLET ORAL at 07:06

## 2021-06-10 RX ADMIN — METOPROLOL SUCCINATE 50 MG: 50 TABLET, EXTENDED RELEASE ORAL at 08:06

## 2021-06-10 RX ADMIN — ATORVASTATIN CALCIUM 40 MG: 20 TABLET, FILM COATED ORAL at 08:06

## 2021-06-10 RX ADMIN — DOCUSATE SODIUM 50 MG: 50 CAPSULE, LIQUID FILLED ORAL at 08:06

## 2021-06-10 RX ADMIN — ASPIRIN 81 MG: 81 TABLET, COATED ORAL at 08:06

## 2021-06-10 RX ADMIN — VANCOMYCIN HYDROCHLORIDE 1000 MG: 1 INJECTION, POWDER, LYOPHILIZED, FOR SOLUTION INTRAVENOUS at 08:06

## 2021-06-10 RX ADMIN — FUROSEMIDE 40 MG: 10 INJECTION, SOLUTION INTRAMUSCULAR; INTRAVENOUS at 02:06

## 2021-06-10 RX ADMIN — PANTOPRAZOLE SODIUM 40 MG: 40 TABLET, DELAYED RELEASE ORAL at 08:06

## 2021-06-11 PROBLEM — R57.9 SHOCK: Status: RESOLVED | Noted: 2021-05-23 | Resolved: 2021-06-11

## 2021-06-11 LAB
ANION GAP SERPL CALC-SCNC: 9 MMOL/L (ref 8–16)
BASOPHILS # BLD AUTO: 0.1 K/UL (ref 0–0.2)
BASOPHILS NFR BLD: 1.6 % (ref 0–1.9)
BLD PROD TYP BPU: NORMAL
BLOOD UNIT EXPIRATION DATE: NORMAL
BLOOD UNIT TYPE CODE: 5100
BLOOD UNIT TYPE: NORMAL
BUN SERPL-MCNC: 32 MG/DL (ref 8–23)
CALCIUM SERPL-MCNC: 8.7 MG/DL (ref 8.7–10.5)
CHLORIDE SERPL-SCNC: 103 MMOL/L (ref 95–110)
CO2 SERPL-SCNC: 23 MMOL/L (ref 23–29)
CODING SYSTEM: NORMAL
CREAT SERPL-MCNC: 1.4 MG/DL (ref 0.5–1.4)
DIFFERENTIAL METHOD: ABNORMAL
DISPENSE STATUS: NORMAL
EOSINOPHIL # BLD AUTO: 0.2 K/UL (ref 0–0.5)
EOSINOPHIL NFR BLD: 2.8 % (ref 0–8)
ERYTHROCYTE [DISTWIDTH] IN BLOOD BY AUTOMATED COUNT: 15.3 % (ref 11.5–14.5)
EST. GFR  (AFRICAN AMERICAN): 57.2 ML/MIN/1.73 M^2
EST. GFR  (NON AFRICAN AMERICAN): 49.5 ML/MIN/1.73 M^2
GLUCOSE SERPL-MCNC: 95 MG/DL (ref 70–110)
HCT VFR BLD AUTO: 27.2 % (ref 40–54)
HGB BLD-MCNC: 8.9 G/DL (ref 14–18)
IMM GRANULOCYTES # BLD AUTO: 0.04 K/UL (ref 0–0.04)
IMM GRANULOCYTES NFR BLD AUTO: 0.7 % (ref 0–0.5)
LYMPHOCYTES # BLD AUTO: 1.6 K/UL (ref 1–4.8)
LYMPHOCYTES NFR BLD: 26.1 % (ref 18–48)
MAGNESIUM SERPL-MCNC: 1.8 MG/DL (ref 1.6–2.6)
MCH RBC QN AUTO: 27.7 PG (ref 27–31)
MCHC RBC AUTO-ENTMCNC: 32.7 G/DL (ref 32–36)
MCV RBC AUTO: 85 FL (ref 82–98)
MONOCYTES # BLD AUTO: 0.6 K/UL (ref 0.3–1)
MONOCYTES NFR BLD: 10.5 % (ref 4–15)
NEUTROPHILS # BLD AUTO: 3.6 K/UL (ref 1.8–7.7)
NEUTROPHILS NFR BLD: 58.3 % (ref 38–73)
NRBC BLD-RTO: 0 /100 WBC
PHOSPHATE SERPL-MCNC: 3.2 MG/DL (ref 2.7–4.5)
PLATELET # BLD AUTO: 198 K/UL (ref 150–450)
PMV BLD AUTO: 9.3 FL (ref 9.2–12.9)
POCT GLUCOSE: 135 MG/DL (ref 70–110)
POCT GLUCOSE: 79 MG/DL (ref 70–110)
POCT GLUCOSE: 98 MG/DL (ref 70–110)
POTASSIUM SERPL-SCNC: 4.2 MMOL/L (ref 3.5–5.1)
RBC # BLD AUTO: 3.21 M/UL (ref 4.6–6.2)
SARS-COV-2 RDRP RESP QL NAA+PROBE: NEGATIVE
SODIUM SERPL-SCNC: 135 MMOL/L (ref 136–145)
TRANS ERYTHROCYTES VOL PATIENT: NORMAL ML
WBC # BLD AUTO: 6.1 K/UL (ref 3.9–12.7)

## 2021-06-11 PROCEDURE — 63600175 PHARM REV CODE 636 W HCPCS: Performed by: STUDENT IN AN ORGANIZED HEALTH CARE EDUCATION/TRAINING PROGRAM

## 2021-06-11 PROCEDURE — 85025 COMPLETE CBC W/AUTO DIFF WBC: CPT | Performed by: STUDENT IN AN ORGANIZED HEALTH CARE EDUCATION/TRAINING PROGRAM

## 2021-06-11 PROCEDURE — A4216 STERILE WATER/SALINE, 10 ML: HCPCS | Performed by: INTERNAL MEDICINE

## 2021-06-11 PROCEDURE — 25000003 PHARM REV CODE 250: Performed by: STUDENT IN AN ORGANIZED HEALTH CARE EDUCATION/TRAINING PROGRAM

## 2021-06-11 PROCEDURE — 11000001 HC ACUTE MED/SURG PRIVATE ROOM

## 2021-06-11 PROCEDURE — 94761 N-INVAS EAR/PLS OXIMETRY MLT: CPT

## 2021-06-11 PROCEDURE — 99900035 HC TECH TIME PER 15 MIN (STAT)

## 2021-06-11 PROCEDURE — 99232 PR SUBSEQUENT HOSPITAL CARE,LEVL II: ICD-10-PCS | Mod: ,,, | Performed by: INTERNAL MEDICINE

## 2021-06-11 PROCEDURE — 25000003 PHARM REV CODE 250: Performed by: INTERNAL MEDICINE

## 2021-06-11 PROCEDURE — 80048 BASIC METABOLIC PNL TOTAL CA: CPT | Performed by: STUDENT IN AN ORGANIZED HEALTH CARE EDUCATION/TRAINING PROGRAM

## 2021-06-11 PROCEDURE — 27000221 HC OXYGEN, UP TO 24 HOURS

## 2021-06-11 PROCEDURE — 99232 SBSQ HOSP IP/OBS MODERATE 35: CPT | Mod: ,,, | Performed by: INTERNAL MEDICINE

## 2021-06-11 PROCEDURE — 83735 ASSAY OF MAGNESIUM: CPT | Performed by: STUDENT IN AN ORGANIZED HEALTH CARE EDUCATION/TRAINING PROGRAM

## 2021-06-11 PROCEDURE — U0002 COVID-19 LAB TEST NON-CDC: HCPCS | Performed by: STUDENT IN AN ORGANIZED HEALTH CARE EDUCATION/TRAINING PROGRAM

## 2021-06-11 PROCEDURE — 84100 ASSAY OF PHOSPHORUS: CPT | Performed by: STUDENT IN AN ORGANIZED HEALTH CARE EDUCATION/TRAINING PROGRAM

## 2021-06-11 RX ORDER — ATORVASTATIN CALCIUM 80 MG/1
80 TABLET, FILM COATED ORAL NIGHTLY
Qty: 30 TABLET | Refills: 11 | Status: SHIPPED | OUTPATIENT
Start: 2021-06-11 | End: 2022-06-11

## 2021-06-11 RX ORDER — ISOSORBIDE MONONITRATE 30 MG/1
90 TABLET, EXTENDED RELEASE ORAL DAILY
Status: ON HOLD | COMMUNITY
End: 2021-06-11 | Stop reason: HOSPADM

## 2021-06-11 RX ORDER — LISINOPRIL 10 MG/1
10 TABLET ORAL DAILY
Status: ON HOLD | COMMUNITY
End: 2021-06-11 | Stop reason: HOSPADM

## 2021-06-11 RX ORDER — OMEPRAZOLE 40 MG/1
40 CAPSULE, DELAYED RELEASE ORAL
Qty: 41 CAPSULE | Refills: 0 | Status: SHIPPED | OUTPATIENT
Start: 2021-06-11 | End: 2021-07-22

## 2021-06-11 RX ORDER — DOXYCYCLINE HYCLATE 100 MG
100 TABLET ORAL EVERY 12 HOURS
Qty: 30 TABLET | Refills: 11 | Status: SHIPPED | OUTPATIENT
Start: 2021-07-07

## 2021-06-11 RX ADMIN — HYDROCODONE BITARTRATE AND ACETAMINOPHEN 1 TABLET: 5; 325 TABLET ORAL at 08:06

## 2021-06-11 RX ADMIN — HEPARIN SODIUM 5000 UNITS: 5000 INJECTION INTRAVENOUS; SUBCUTANEOUS at 05:06

## 2021-06-11 RX ADMIN — HYDROCODONE BITARTRATE AND ACETAMINOPHEN 1 TABLET: 5; 325 TABLET ORAL at 06:06

## 2021-06-11 RX ADMIN — METOPROLOL SUCCINATE 50 MG: 50 TABLET, EXTENDED RELEASE ORAL at 08:06

## 2021-06-11 RX ADMIN — ASPIRIN 81 MG: 81 TABLET, COATED ORAL at 08:06

## 2021-06-11 RX ADMIN — Medication 10 ML: at 12:06

## 2021-06-11 RX ADMIN — HEPARIN SODIUM 5000 UNITS: 5000 INJECTION INTRAVENOUS; SUBCUTANEOUS at 02:06

## 2021-06-11 RX ADMIN — DOCUSATE SODIUM 50 MG: 50 CAPSULE, LIQUID FILLED ORAL at 08:06

## 2021-06-11 RX ADMIN — VANCOMYCIN HYDROCHLORIDE 1000 MG: 1 INJECTION, POWDER, LYOPHILIZED, FOR SOLUTION INTRAVENOUS at 08:06

## 2021-06-11 RX ADMIN — Medication 10 ML: at 11:06

## 2021-06-11 RX ADMIN — FUROSEMIDE 80 MG: 80 TABLET ORAL at 05:06

## 2021-06-11 RX ADMIN — LOSARTAN POTASSIUM 25 MG: 25 TABLET, FILM COATED ORAL at 08:06

## 2021-06-11 RX ADMIN — POLYETHYLENE GLYCOL 3350 17 G: 17 POWDER, FOR SOLUTION ORAL at 08:06

## 2021-06-11 RX ADMIN — HEPARIN SODIUM 5000 UNITS: 5000 INJECTION INTRAVENOUS; SUBCUTANEOUS at 09:06

## 2021-06-11 RX ADMIN — PANTOPRAZOLE SODIUM 40 MG: 40 TABLET, DELAYED RELEASE ORAL at 08:06

## 2021-06-11 RX ADMIN — FUROSEMIDE 80 MG: 80 TABLET ORAL at 08:06

## 2021-06-11 RX ADMIN — Medication 10 ML: at 05:06

## 2021-06-11 RX ADMIN — ATORVASTATIN CALCIUM 40 MG: 20 TABLET, FILM COATED ORAL at 08:06

## 2021-06-11 RX ADMIN — FERROUS SULFATE TAB EC 325 MG (65 MG FE EQUIVALENT) 325 MG: 325 (65 FE) TABLET DELAYED RESPONSE at 08:06

## 2021-06-12 PROBLEM — E87.1 HYPONATREMIA: Status: RESOLVED | Noted: 2021-05-05 | Resolved: 2021-06-12

## 2021-06-12 LAB
ANION GAP SERPL CALC-SCNC: 12 MMOL/L (ref 8–16)
BASOPHILS # BLD AUTO: 0.1 K/UL (ref 0–0.2)
BASOPHILS NFR BLD: 1.8 % (ref 0–1.9)
BUN SERPL-MCNC: 25 MG/DL (ref 8–23)
CALCIUM SERPL-MCNC: 8.7 MG/DL (ref 8.7–10.5)
CHLORIDE SERPL-SCNC: 105 MMOL/L (ref 95–110)
CO2 SERPL-SCNC: 22 MMOL/L (ref 23–29)
CREAT SERPL-MCNC: 1.2 MG/DL (ref 0.5–1.4)
DIFFERENTIAL METHOD: ABNORMAL
EOSINOPHIL # BLD AUTO: 0.2 K/UL (ref 0–0.5)
EOSINOPHIL NFR BLD: 3 % (ref 0–8)
ERYTHROCYTE [DISTWIDTH] IN BLOOD BY AUTOMATED COUNT: 15.5 % (ref 11.5–14.5)
EST. GFR  (AFRICAN AMERICAN): >60 ML/MIN/1.73 M^2
EST. GFR  (NON AFRICAN AMERICAN): 59.6 ML/MIN/1.73 M^2
GLUCOSE SERPL-MCNC: 86 MG/DL (ref 70–110)
HCT VFR BLD AUTO: 28.1 % (ref 40–54)
HGB BLD-MCNC: 8.6 G/DL (ref 14–18)
IMM GRANULOCYTES # BLD AUTO: 0.03 K/UL (ref 0–0.04)
IMM GRANULOCYTES NFR BLD AUTO: 0.5 % (ref 0–0.5)
LYMPHOCYTES # BLD AUTO: 1.5 K/UL (ref 1–4.8)
LYMPHOCYTES NFR BLD: 25.8 % (ref 18–48)
MAGNESIUM SERPL-MCNC: 1.7 MG/DL (ref 1.6–2.6)
MCH RBC QN AUTO: 26.8 PG (ref 27–31)
MCHC RBC AUTO-ENTMCNC: 30.6 G/DL (ref 32–36)
MCV RBC AUTO: 88 FL (ref 82–98)
MONOCYTES # BLD AUTO: 0.5 K/UL (ref 0.3–1)
MONOCYTES NFR BLD: 8.6 % (ref 4–15)
NEUTROPHILS # BLD AUTO: 3.4 K/UL (ref 1.8–7.7)
NEUTROPHILS NFR BLD: 60.3 % (ref 38–73)
NRBC BLD-RTO: 0 /100 WBC
PHOSPHATE SERPL-MCNC: 3.4 MG/DL (ref 2.7–4.5)
PLATELET # BLD AUTO: 223 K/UL (ref 150–450)
PMV BLD AUTO: 9.8 FL (ref 9.2–12.9)
POCT GLUCOSE: 156 MG/DL (ref 70–110)
POCT GLUCOSE: 164 MG/DL (ref 70–110)
POCT GLUCOSE: 179 MG/DL (ref 70–110)
POCT GLUCOSE: 94 MG/DL (ref 70–110)
POTASSIUM SERPL-SCNC: 4 MMOL/L (ref 3.5–5.1)
RBC # BLD AUTO: 3.21 M/UL (ref 4.6–6.2)
SODIUM SERPL-SCNC: 139 MMOL/L (ref 136–145)
VANCOMYCIN SERPL-MCNC: 20.8 UG/ML
WBC # BLD AUTO: 5.7 K/UL (ref 3.9–12.7)

## 2021-06-12 PROCEDURE — 85025 COMPLETE CBC W/AUTO DIFF WBC: CPT | Performed by: STUDENT IN AN ORGANIZED HEALTH CARE EDUCATION/TRAINING PROGRAM

## 2021-06-12 PROCEDURE — 80048 BASIC METABOLIC PNL TOTAL CA: CPT | Performed by: STUDENT IN AN ORGANIZED HEALTH CARE EDUCATION/TRAINING PROGRAM

## 2021-06-12 PROCEDURE — 25000003 PHARM REV CODE 250: Performed by: INTERNAL MEDICINE

## 2021-06-12 PROCEDURE — 84100 ASSAY OF PHOSPHORUS: CPT | Performed by: STUDENT IN AN ORGANIZED HEALTH CARE EDUCATION/TRAINING PROGRAM

## 2021-06-12 PROCEDURE — A4216 STERILE WATER/SALINE, 10 ML: HCPCS | Performed by: INTERNAL MEDICINE

## 2021-06-12 PROCEDURE — 25000003 PHARM REV CODE 250: Performed by: STUDENT IN AN ORGANIZED HEALTH CARE EDUCATION/TRAINING PROGRAM

## 2021-06-12 PROCEDURE — 63600175 PHARM REV CODE 636 W HCPCS: Performed by: STUDENT IN AN ORGANIZED HEALTH CARE EDUCATION/TRAINING PROGRAM

## 2021-06-12 PROCEDURE — 99232 SBSQ HOSP IP/OBS MODERATE 35: CPT | Mod: ,,, | Performed by: HOSPITALIST

## 2021-06-12 PROCEDURE — 80202 ASSAY OF VANCOMYCIN: CPT | Performed by: INTERNAL MEDICINE

## 2021-06-12 PROCEDURE — 83735 ASSAY OF MAGNESIUM: CPT | Performed by: STUDENT IN AN ORGANIZED HEALTH CARE EDUCATION/TRAINING PROGRAM

## 2021-06-12 PROCEDURE — 36415 COLL VENOUS BLD VENIPUNCTURE: CPT | Performed by: INTERNAL MEDICINE

## 2021-06-12 PROCEDURE — 99232 PR SUBSEQUENT HOSPITAL CARE,LEVL II: ICD-10-PCS | Mod: ,,, | Performed by: HOSPITALIST

## 2021-06-12 PROCEDURE — 11000001 HC ACUTE MED/SURG PRIVATE ROOM

## 2021-06-12 RX ORDER — ACETAMINOPHEN 325 MG/1
650 TABLET ORAL EVERY 6 HOURS PRN
Status: DISCONTINUED | OUTPATIENT
Start: 2021-06-12 | End: 2021-06-16 | Stop reason: HOSPADM

## 2021-06-12 RX ADMIN — FUROSEMIDE 80 MG: 80 TABLET ORAL at 09:06

## 2021-06-12 RX ADMIN — ASPIRIN 81 MG: 81 TABLET, COATED ORAL at 09:06

## 2021-06-12 RX ADMIN — HEPARIN SODIUM 5000 UNITS: 5000 INJECTION INTRAVENOUS; SUBCUTANEOUS at 10:06

## 2021-06-12 RX ADMIN — HEPARIN SODIUM 5000 UNITS: 5000 INJECTION INTRAVENOUS; SUBCUTANEOUS at 06:06

## 2021-06-12 RX ADMIN — METOPROLOL SUCCINATE 50 MG: 50 TABLET, EXTENDED RELEASE ORAL at 09:06

## 2021-06-12 RX ADMIN — Medication 10 ML: at 06:06

## 2021-06-12 RX ADMIN — DOCUSATE SODIUM 50 MG: 50 CAPSULE, LIQUID FILLED ORAL at 09:06

## 2021-06-12 RX ADMIN — ATORVASTATIN CALCIUM 40 MG: 20 TABLET, FILM COATED ORAL at 09:06

## 2021-06-12 RX ADMIN — POLYETHYLENE GLYCOL 3350 17 G: 17 POWDER, FOR SOLUTION ORAL at 09:06

## 2021-06-12 RX ADMIN — FUROSEMIDE 80 MG: 80 TABLET ORAL at 06:06

## 2021-06-12 RX ADMIN — HEPARIN SODIUM 5000 UNITS: 5000 INJECTION INTRAVENOUS; SUBCUTANEOUS at 04:06

## 2021-06-12 RX ADMIN — LOSARTAN POTASSIUM 25 MG: 25 TABLET, FILM COATED ORAL at 08:06

## 2021-06-12 RX ADMIN — Medication 10 ML: at 11:06

## 2021-06-12 RX ADMIN — SODIUM CHLORIDE 35 ML/HR: 0.9 INJECTION, SOLUTION INTRAVENOUS at 09:06

## 2021-06-12 RX ADMIN — PANTOPRAZOLE SODIUM 40 MG: 40 TABLET, DELAYED RELEASE ORAL at 09:06

## 2021-06-12 RX ADMIN — VANCOMYCIN HYDROCHLORIDE 1000 MG: 1 INJECTION, POWDER, LYOPHILIZED, FOR SOLUTION INTRAVENOUS at 09:06

## 2021-06-12 RX ADMIN — HYDROCODONE BITARTRATE AND ACETAMINOPHEN 1 TABLET: 5; 325 TABLET ORAL at 06:06

## 2021-06-12 RX ADMIN — HYDROCODONE BITARTRATE AND ACETAMINOPHEN 1 TABLET: 5; 325 TABLET ORAL at 08:06

## 2021-06-13 PROBLEM — I25.5 ISCHEMIC CARDIOMYOPATHY: Chronic | Status: ACTIVE | Noted: 2021-02-02

## 2021-06-13 LAB
POCT GLUCOSE: 123 MG/DL (ref 70–110)
POCT GLUCOSE: 178 MG/DL (ref 70–110)
POCT GLUCOSE: 69 MG/DL (ref 70–110)

## 2021-06-13 PROCEDURE — 11000001 HC ACUTE MED/SURG PRIVATE ROOM

## 2021-06-13 PROCEDURE — 63600175 PHARM REV CODE 636 W HCPCS: Performed by: STUDENT IN AN ORGANIZED HEALTH CARE EDUCATION/TRAINING PROGRAM

## 2021-06-13 PROCEDURE — 25000003 PHARM REV CODE 250: Performed by: INTERNAL MEDICINE

## 2021-06-13 PROCEDURE — 25000003 PHARM REV CODE 250: Performed by: STUDENT IN AN ORGANIZED HEALTH CARE EDUCATION/TRAINING PROGRAM

## 2021-06-13 PROCEDURE — A4216 STERILE WATER/SALINE, 10 ML: HCPCS | Performed by: INTERNAL MEDICINE

## 2021-06-13 PROCEDURE — 99232 PR SUBSEQUENT HOSPITAL CARE,LEVL II: ICD-10-PCS | Mod: ,,, | Performed by: HOSPITALIST

## 2021-06-13 PROCEDURE — 99232 SBSQ HOSP IP/OBS MODERATE 35: CPT | Mod: ,,, | Performed by: HOSPITALIST

## 2021-06-13 RX ADMIN — ATORVASTATIN CALCIUM 40 MG: 20 TABLET, FILM COATED ORAL at 09:06

## 2021-06-13 RX ADMIN — HEPARIN SODIUM 5000 UNITS: 5000 INJECTION INTRAVENOUS; SUBCUTANEOUS at 05:06

## 2021-06-13 RX ADMIN — Medication 10 ML: at 12:06

## 2021-06-13 RX ADMIN — SODIUM CHLORIDE 35 ML/HR: 0.9 INJECTION, SOLUTION INTRAVENOUS at 09:06

## 2021-06-13 RX ADMIN — HYDROCODONE BITARTRATE AND ACETAMINOPHEN 1 TABLET: 5; 325 TABLET ORAL at 08:06

## 2021-06-13 RX ADMIN — LOSARTAN POTASSIUM 25 MG: 25 TABLET, FILM COATED ORAL at 08:06

## 2021-06-13 RX ADMIN — HEPARIN SODIUM 5000 UNITS: 5000 INJECTION INTRAVENOUS; SUBCUTANEOUS at 11:06

## 2021-06-13 RX ADMIN — VANCOMYCIN HYDROCHLORIDE 1000 MG: 1 INJECTION, POWDER, LYOPHILIZED, FOR SOLUTION INTRAVENOUS at 09:06

## 2021-06-13 RX ADMIN — DOCUSATE SODIUM 50 MG: 50 CAPSULE, LIQUID FILLED ORAL at 09:06

## 2021-06-13 RX ADMIN — METOPROLOL SUCCINATE 50 MG: 50 TABLET, EXTENDED RELEASE ORAL at 09:06

## 2021-06-13 RX ADMIN — FUROSEMIDE 80 MG: 80 TABLET ORAL at 09:06

## 2021-06-13 RX ADMIN — Medication 10 ML: at 05:06

## 2021-06-13 RX ADMIN — POLYETHYLENE GLYCOL 3350 17 G: 17 POWDER, FOR SOLUTION ORAL at 09:06

## 2021-06-13 RX ADMIN — PANTOPRAZOLE SODIUM 40 MG: 40 TABLET, DELAYED RELEASE ORAL at 09:06

## 2021-06-13 RX ADMIN — ASPIRIN 81 MG: 81 TABLET, COATED ORAL at 09:06

## 2021-06-13 RX ADMIN — FUROSEMIDE 80 MG: 80 TABLET ORAL at 05:06

## 2021-06-14 LAB
ANION GAP SERPL CALC-SCNC: 12 MMOL/L (ref 8–16)
BASOPHILS # BLD AUTO: 0.15 K/UL (ref 0–0.2)
BASOPHILS NFR BLD: 2.4 % (ref 0–1.9)
BUN SERPL-MCNC: 22 MG/DL (ref 8–23)
CALCIUM SERPL-MCNC: 8.8 MG/DL (ref 8.7–10.5)
CHLORIDE SERPL-SCNC: 105 MMOL/L (ref 95–110)
CO2 SERPL-SCNC: 21 MMOL/L (ref 23–29)
CREAT SERPL-MCNC: 1.2 MG/DL (ref 0.5–1.4)
DIFFERENTIAL METHOD: ABNORMAL
EOSINOPHIL # BLD AUTO: 0.2 K/UL (ref 0–0.5)
EOSINOPHIL NFR BLD: 3 % (ref 0–8)
EPO SERPL-ACNC: 9.7 MIU/ML (ref 2.6–18.5)
ERYTHROCYTE [DISTWIDTH] IN BLOOD BY AUTOMATED COUNT: 15.6 % (ref 11.5–14.5)
EST. GFR  (AFRICAN AMERICAN): >60 ML/MIN/1.73 M^2
EST. GFR  (NON AFRICAN AMERICAN): 59.6 ML/MIN/1.73 M^2
GLUCOSE SERPL-MCNC: 80 MG/DL (ref 70–110)
HCT VFR BLD AUTO: 30.4 % (ref 40–54)
HGB BLD-MCNC: 9.6 G/DL (ref 14–18)
IMM GRANULOCYTES # BLD AUTO: 0.03 K/UL (ref 0–0.04)
IMM GRANULOCYTES NFR BLD AUTO: 0.5 % (ref 0–0.5)
LYMPHOCYTES # BLD AUTO: 2.1 K/UL (ref 1–4.8)
LYMPHOCYTES NFR BLD: 32.5 % (ref 18–48)
MAGNESIUM SERPL-MCNC: 1.8 MG/DL (ref 1.6–2.6)
MCH RBC QN AUTO: 28.2 PG (ref 27–31)
MCHC RBC AUTO-ENTMCNC: 31.6 G/DL (ref 32–36)
MCV RBC AUTO: 89 FL (ref 82–98)
MONOCYTES # BLD AUTO: 0.6 K/UL (ref 0.3–1)
MONOCYTES NFR BLD: 9.2 % (ref 4–15)
NEUTROPHILS # BLD AUTO: 3.3 K/UL (ref 1.8–7.7)
NEUTROPHILS NFR BLD: 52.4 % (ref 38–73)
NRBC BLD-RTO: 0 /100 WBC
PHOSPHATE SERPL-MCNC: 3.9 MG/DL (ref 2.7–4.5)
PLATELET # BLD AUTO: 207 K/UL (ref 150–450)
PMV BLD AUTO: 10.1 FL (ref 9.2–12.9)
POCT GLUCOSE: 103 MG/DL (ref 70–110)
POCT GLUCOSE: 110 MG/DL (ref 70–110)
POCT GLUCOSE: 118 MG/DL (ref 70–110)
POCT GLUCOSE: 121 MG/DL (ref 70–110)
POCT GLUCOSE: 92 MG/DL (ref 70–110)
POTASSIUM SERPL-SCNC: 4.4 MMOL/L (ref 3.5–5.1)
RBC # BLD AUTO: 3.41 M/UL (ref 4.6–6.2)
SODIUM SERPL-SCNC: 138 MMOL/L (ref 136–145)
VANCOMYCIN TROUGH SERPL-MCNC: 19.3 UG/ML (ref 10–22)
WBC # BLD AUTO: 6.33 K/UL (ref 3.9–12.7)

## 2021-06-14 PROCEDURE — 25000003 PHARM REV CODE 250: Performed by: INTERNAL MEDICINE

## 2021-06-14 PROCEDURE — 99232 PR SUBSEQUENT HOSPITAL CARE,LEVL II: ICD-10-PCS | Mod: ,,, | Performed by: HOSPITALIST

## 2021-06-14 PROCEDURE — 80048 BASIC METABOLIC PNL TOTAL CA: CPT | Performed by: HOSPITALIST

## 2021-06-14 PROCEDURE — 36415 COLL VENOUS BLD VENIPUNCTURE: CPT | Performed by: HOSPITALIST

## 2021-06-14 PROCEDURE — A4216 STERILE WATER/SALINE, 10 ML: HCPCS | Performed by: INTERNAL MEDICINE

## 2021-06-14 PROCEDURE — 84100 ASSAY OF PHOSPHORUS: CPT | Performed by: HOSPITALIST

## 2021-06-14 PROCEDURE — 83735 ASSAY OF MAGNESIUM: CPT | Performed by: HOSPITALIST

## 2021-06-14 PROCEDURE — 25000003 PHARM REV CODE 250: Performed by: STUDENT IN AN ORGANIZED HEALTH CARE EDUCATION/TRAINING PROGRAM

## 2021-06-14 PROCEDURE — 80202 ASSAY OF VANCOMYCIN: CPT | Performed by: HOSPITALIST

## 2021-06-14 PROCEDURE — 63600175 PHARM REV CODE 636 W HCPCS: Performed by: STUDENT IN AN ORGANIZED HEALTH CARE EDUCATION/TRAINING PROGRAM

## 2021-06-14 PROCEDURE — 99232 SBSQ HOSP IP/OBS MODERATE 35: CPT | Mod: ,,, | Performed by: HOSPITALIST

## 2021-06-14 PROCEDURE — 11000001 HC ACUTE MED/SURG PRIVATE ROOM

## 2021-06-14 PROCEDURE — 85025 COMPLETE CBC W/AUTO DIFF WBC: CPT | Performed by: HOSPITALIST

## 2021-06-14 RX ADMIN — FUROSEMIDE 80 MG: 80 TABLET ORAL at 05:06

## 2021-06-14 RX ADMIN — POLYETHYLENE GLYCOL 3350 17 G: 17 POWDER, FOR SOLUTION ORAL at 08:06

## 2021-06-14 RX ADMIN — Medication 10 ML: at 05:06

## 2021-06-14 RX ADMIN — Medication 10 ML: at 12:06

## 2021-06-14 RX ADMIN — ATORVASTATIN CALCIUM 40 MG: 20 TABLET, FILM COATED ORAL at 08:06

## 2021-06-14 RX ADMIN — VANCOMYCIN HYDROCHLORIDE 1000 MG: 1 INJECTION, POWDER, LYOPHILIZED, FOR SOLUTION INTRAVENOUS at 12:06

## 2021-06-14 RX ADMIN — METOPROLOL SUCCINATE 50 MG: 50 TABLET, EXTENDED RELEASE ORAL at 08:06

## 2021-06-14 RX ADMIN — HEPARIN SODIUM 5000 UNITS: 5000 INJECTION INTRAVENOUS; SUBCUTANEOUS at 03:06

## 2021-06-14 RX ADMIN — ASPIRIN 81 MG: 81 TABLET, COATED ORAL at 08:06

## 2021-06-14 RX ADMIN — PANTOPRAZOLE SODIUM 40 MG: 40 TABLET, DELAYED RELEASE ORAL at 08:06

## 2021-06-14 RX ADMIN — LOSARTAN POTASSIUM 25 MG: 25 TABLET, FILM COATED ORAL at 09:06

## 2021-06-14 RX ADMIN — DOCUSATE SODIUM 50 MG: 50 CAPSULE, LIQUID FILLED ORAL at 08:06

## 2021-06-14 RX ADMIN — HEPARIN SODIUM 5000 UNITS: 5000 INJECTION INTRAVENOUS; SUBCUTANEOUS at 05:06

## 2021-06-14 RX ADMIN — FUROSEMIDE 80 MG: 80 TABLET ORAL at 08:06

## 2021-06-14 RX ADMIN — HEPARIN SODIUM 5000 UNITS: 5000 INJECTION INTRAVENOUS; SUBCUTANEOUS at 09:06

## 2021-06-15 LAB
ANION GAP SERPL CALC-SCNC: 12 MMOL/L (ref 8–16)
BUN SERPL-MCNC: 21 MG/DL (ref 8–23)
CALCIUM SERPL-MCNC: 9.1 MG/DL (ref 8.7–10.5)
CHLORIDE SERPL-SCNC: 106 MMOL/L (ref 95–110)
CO2 SERPL-SCNC: 22 MMOL/L (ref 23–29)
CREAT SERPL-MCNC: 1.3 MG/DL (ref 0.5–1.4)
EST. GFR  (AFRICAN AMERICAN): >60 ML/MIN/1.73 M^2
EST. GFR  (NON AFRICAN AMERICAN): 54.1 ML/MIN/1.73 M^2
GLUCOSE SERPL-MCNC: 75 MG/DL (ref 70–110)
POCT GLUCOSE: 112 MG/DL (ref 70–110)
POCT GLUCOSE: 143 MG/DL (ref 70–110)
POCT GLUCOSE: 83 MG/DL (ref 70–110)
POTASSIUM SERPL-SCNC: 4.1 MMOL/L (ref 3.5–5.1)
SARS-COV-2 RNA RESP QL NAA+PROBE: NOT DETECTED
SODIUM SERPL-SCNC: 140 MMOL/L (ref 136–145)

## 2021-06-15 PROCEDURE — 25000003 PHARM REV CODE 250: Performed by: INTERNAL MEDICINE

## 2021-06-15 PROCEDURE — 25000003 PHARM REV CODE 250: Performed by: STUDENT IN AN ORGANIZED HEALTH CARE EDUCATION/TRAINING PROGRAM

## 2021-06-15 PROCEDURE — U0003 INFECTIOUS AGENT DETECTION BY NUCLEIC ACID (DNA OR RNA); SEVERE ACUTE RESPIRATORY SYNDROME CORONAVIRUS 2 (SARS-COV-2) (CORONAVIRUS DISEASE [COVID-19]), AMPLIFIED PROBE TECHNIQUE, MAKING USE OF HIGH THROUGHPUT TECHNOLOGIES AS DESCRIBED BY CMS-2020-01-R: HCPCS | Performed by: HOSPITALIST

## 2021-06-15 PROCEDURE — 99232 PR SUBSEQUENT HOSPITAL CARE,LEVL II: ICD-10-PCS | Mod: ,,, | Performed by: HOSPITALIST

## 2021-06-15 PROCEDURE — 63600175 PHARM REV CODE 636 W HCPCS: Performed by: STUDENT IN AN ORGANIZED HEALTH CARE EDUCATION/TRAINING PROGRAM

## 2021-06-15 PROCEDURE — 80048 BASIC METABOLIC PNL TOTAL CA: CPT | Performed by: HOSPITALIST

## 2021-06-15 PROCEDURE — A4216 STERILE WATER/SALINE, 10 ML: HCPCS | Performed by: INTERNAL MEDICINE

## 2021-06-15 PROCEDURE — U0005 INFEC AGEN DETEC AMPLI PROBE: HCPCS | Performed by: HOSPITALIST

## 2021-06-15 PROCEDURE — 99900035 HC TECH TIME PER 15 MIN (STAT)

## 2021-06-15 PROCEDURE — 11000001 HC ACUTE MED/SURG PRIVATE ROOM

## 2021-06-15 PROCEDURE — 36415 COLL VENOUS BLD VENIPUNCTURE: CPT | Performed by: HOSPITALIST

## 2021-06-15 PROCEDURE — 99232 SBSQ HOSP IP/OBS MODERATE 35: CPT | Mod: ,,, | Performed by: HOSPITALIST

## 2021-06-15 RX ADMIN — POLYETHYLENE GLYCOL 3350 17 G: 17 POWDER, FOR SOLUTION ORAL at 08:06

## 2021-06-15 RX ADMIN — PANTOPRAZOLE SODIUM 40 MG: 40 TABLET, DELAYED RELEASE ORAL at 08:06

## 2021-06-15 RX ADMIN — FUROSEMIDE 80 MG: 80 TABLET ORAL at 08:06

## 2021-06-15 RX ADMIN — ATORVASTATIN CALCIUM 40 MG: 20 TABLET, FILM COATED ORAL at 08:06

## 2021-06-15 RX ADMIN — Medication 10 ML: at 05:06

## 2021-06-15 RX ADMIN — Medication 10 ML: at 01:06

## 2021-06-15 RX ADMIN — VANCOMYCIN HYDROCHLORIDE 1000 MG: 1 INJECTION, POWDER, LYOPHILIZED, FOR SOLUTION INTRAVENOUS at 01:06

## 2021-06-15 RX ADMIN — LOSARTAN POTASSIUM 25 MG: 25 TABLET, FILM COATED ORAL at 09:06

## 2021-06-15 RX ADMIN — Medication 10 ML: at 12:06

## 2021-06-15 RX ADMIN — FUROSEMIDE 80 MG: 80 TABLET ORAL at 05:06

## 2021-06-15 RX ADMIN — HEPARIN SODIUM 5000 UNITS: 5000 INJECTION INTRAVENOUS; SUBCUTANEOUS at 05:06

## 2021-06-15 RX ADMIN — HYDROCODONE BITARTRATE AND ACETAMINOPHEN 1 TABLET: 5; 325 TABLET ORAL at 06:06

## 2021-06-15 RX ADMIN — DOCUSATE SODIUM 50 MG: 50 CAPSULE, LIQUID FILLED ORAL at 08:06

## 2021-06-15 RX ADMIN — HEPARIN SODIUM 5000 UNITS: 5000 INJECTION INTRAVENOUS; SUBCUTANEOUS at 09:06

## 2021-06-15 RX ADMIN — HYDROCODONE BITARTRATE AND ACETAMINOPHEN 1 TABLET: 5; 325 TABLET ORAL at 09:06

## 2021-06-15 RX ADMIN — METOPROLOL SUCCINATE 50 MG: 50 TABLET, EXTENDED RELEASE ORAL at 08:06

## 2021-06-15 RX ADMIN — HYDROCODONE BITARTRATE AND ACETAMINOPHEN 1 TABLET: 5; 325 TABLET ORAL at 12:06

## 2021-06-15 RX ADMIN — ASPIRIN 81 MG: 81 TABLET, COATED ORAL at 08:06

## 2021-06-16 VITALS
BODY MASS INDEX: 26.94 KG/M2 | SYSTOLIC BLOOD PRESSURE: 94 MMHG | TEMPERATURE: 99 F | WEIGHT: 181.88 LBS | HEIGHT: 69 IN | HEART RATE: 99 BPM | OXYGEN SATURATION: 97 % | RESPIRATION RATE: 23 BRPM | DIASTOLIC BLOOD PRESSURE: 62 MMHG

## 2021-06-16 LAB
ANION GAP SERPL CALC-SCNC: 12 MMOL/L (ref 8–16)
BASOPHILS # BLD AUTO: 0.1 K/UL (ref 0–0.2)
BASOPHILS NFR BLD: 1.5 % (ref 0–1.9)
BUN SERPL-MCNC: 22 MG/DL (ref 8–23)
CALCIUM SERPL-MCNC: 8.6 MG/DL (ref 8.7–10.5)
CHLORIDE SERPL-SCNC: 104 MMOL/L (ref 95–110)
CO2 SERPL-SCNC: 21 MMOL/L (ref 23–29)
CREAT SERPL-MCNC: 1.5 MG/DL (ref 0.5–1.4)
DIFFERENTIAL METHOD: ABNORMAL
EOSINOPHIL # BLD AUTO: 0.2 K/UL (ref 0–0.5)
EOSINOPHIL NFR BLD: 3.3 % (ref 0–8)
ERYTHROCYTE [DISTWIDTH] IN BLOOD BY AUTOMATED COUNT: 15.5 % (ref 11.5–14.5)
EST. GFR  (AFRICAN AMERICAN): 52.6 ML/MIN/1.73 M^2
EST. GFR  (NON AFRICAN AMERICAN): 45.5 ML/MIN/1.73 M^2
GLUCOSE SERPL-MCNC: 80 MG/DL (ref 70–110)
HCT VFR BLD AUTO: 28.6 % (ref 40–54)
HGB BLD-MCNC: 8.8 G/DL (ref 14–18)
IMM GRANULOCYTES # BLD AUTO: 0.04 K/UL (ref 0–0.04)
IMM GRANULOCYTES NFR BLD AUTO: 0.6 % (ref 0–0.5)
LYMPHOCYTES # BLD AUTO: 2 K/UL (ref 1–4.8)
LYMPHOCYTES NFR BLD: 30.3 % (ref 18–48)
MAGNESIUM SERPL-MCNC: 1.7 MG/DL (ref 1.6–2.6)
MCH RBC QN AUTO: 27.4 PG (ref 27–31)
MCHC RBC AUTO-ENTMCNC: 30.8 G/DL (ref 32–36)
MCV RBC AUTO: 89 FL (ref 82–98)
MONOCYTES # BLD AUTO: 0.6 K/UL (ref 0.3–1)
MONOCYTES NFR BLD: 9.6 % (ref 4–15)
NEUTROPHILS # BLD AUTO: 3.5 K/UL (ref 1.8–7.7)
NEUTROPHILS NFR BLD: 54.7 % (ref 38–73)
NRBC BLD-RTO: 0 /100 WBC
PHOSPHATE SERPL-MCNC: 3.5 MG/DL (ref 2.7–4.5)
PLATELET # BLD AUTO: 228 K/UL (ref 150–450)
PMV BLD AUTO: 10.2 FL (ref 9.2–12.9)
POCT GLUCOSE: 110 MG/DL (ref 70–110)
POCT GLUCOSE: 130 MG/DL (ref 70–110)
POCT GLUCOSE: 98 MG/DL (ref 70–110)
POTASSIUM SERPL-SCNC: 4 MMOL/L (ref 3.5–5.1)
RBC # BLD AUTO: 3.21 M/UL (ref 4.6–6.2)
SODIUM SERPL-SCNC: 137 MMOL/L (ref 136–145)
VANCOMYCIN TROUGH SERPL-MCNC: 20.8 UG/ML (ref 10–22)
WBC # BLD AUTO: 6.46 K/UL (ref 3.9–12.7)

## 2021-06-16 PROCEDURE — 25000003 PHARM REV CODE 250: Performed by: STUDENT IN AN ORGANIZED HEALTH CARE EDUCATION/TRAINING PROGRAM

## 2021-06-16 PROCEDURE — 85025 COMPLETE CBC W/AUTO DIFF WBC: CPT | Performed by: HOSPITALIST

## 2021-06-16 PROCEDURE — 80202 ASSAY OF VANCOMYCIN: CPT | Performed by: INTERNAL MEDICINE

## 2021-06-16 PROCEDURE — 63600175 PHARM REV CODE 636 W HCPCS: Performed by: STUDENT IN AN ORGANIZED HEALTH CARE EDUCATION/TRAINING PROGRAM

## 2021-06-16 PROCEDURE — 36415 COLL VENOUS BLD VENIPUNCTURE: CPT | Performed by: INTERNAL MEDICINE

## 2021-06-16 PROCEDURE — 84100 ASSAY OF PHOSPHORUS: CPT | Performed by: HOSPITALIST

## 2021-06-16 PROCEDURE — 63600175 PHARM REV CODE 636 W HCPCS: Performed by: INTERNAL MEDICINE

## 2021-06-16 PROCEDURE — A4216 STERILE WATER/SALINE, 10 ML: HCPCS | Performed by: INTERNAL MEDICINE

## 2021-06-16 PROCEDURE — 36415 COLL VENOUS BLD VENIPUNCTURE: CPT | Performed by: HOSPITALIST

## 2021-06-16 PROCEDURE — 83735 ASSAY OF MAGNESIUM: CPT | Performed by: HOSPITALIST

## 2021-06-16 PROCEDURE — 80048 BASIC METABOLIC PNL TOTAL CA: CPT | Performed by: HOSPITALIST

## 2021-06-16 PROCEDURE — 99239 PR HOSPITAL DISCHARGE DAY,>30 MIN: ICD-10-PCS | Mod: 95,,, | Performed by: INTERNAL MEDICINE

## 2021-06-16 PROCEDURE — 25000003 PHARM REV CODE 250: Performed by: INTERNAL MEDICINE

## 2021-06-16 PROCEDURE — 99239 HOSP IP/OBS DSCHRG MGMT >30: CPT | Mod: 95,,, | Performed by: INTERNAL MEDICINE

## 2021-06-16 RX ADMIN — Medication 10 ML: at 11:06

## 2021-06-16 RX ADMIN — Medication 10 ML: at 05:06

## 2021-06-16 RX ADMIN — VANCOMYCIN HYDROCHLORIDE 750 MG: 750 INJECTION, POWDER, LYOPHILIZED, FOR SOLUTION INTRAVENOUS at 05:06

## 2021-06-16 RX ADMIN — ASPIRIN 81 MG: 81 TABLET, COATED ORAL at 08:06

## 2021-06-16 RX ADMIN — Medication 10 ML: at 06:06

## 2021-06-16 RX ADMIN — HYDROCODONE BITARTRATE AND ACETAMINOPHEN 1 TABLET: 5; 325 TABLET ORAL at 06:06

## 2021-06-16 RX ADMIN — Medication 10 ML: at 12:06

## 2021-06-16 RX ADMIN — METOPROLOL SUCCINATE 50 MG: 50 TABLET, EXTENDED RELEASE ORAL at 08:06

## 2021-06-16 RX ADMIN — HEPARIN SODIUM 5000 UNITS: 5000 INJECTION INTRAVENOUS; SUBCUTANEOUS at 06:06

## 2021-06-16 RX ADMIN — FUROSEMIDE 80 MG: 80 TABLET ORAL at 08:06

## 2021-06-16 RX ADMIN — PANTOPRAZOLE SODIUM 40 MG: 40 TABLET, DELAYED RELEASE ORAL at 08:06

## 2021-06-16 RX ADMIN — DOCUSATE SODIUM 50 MG: 50 CAPSULE, LIQUID FILLED ORAL at 08:06

## 2021-06-16 RX ADMIN — ATORVASTATIN CALCIUM 40 MG: 20 TABLET, FILM COATED ORAL at 08:06

## 2021-06-16 RX ADMIN — POLYETHYLENE GLYCOL 3350 17 G: 17 POWDER, FOR SOLUTION ORAL at 08:06

## (undated) DEVICE — SUT MONOCRYL 4-0 PS-1 UND

## (undated) DEVICE — KIT MICROINTRO 4F .018X40X7CM

## (undated) DEVICE — SYR ONLY LUER LOCK 20CC

## (undated) DEVICE — GLOVE BIOGEL PI MICRO SZ 7.5

## (undated) DEVICE — SUT 2/0 30IN SILK BLK BRAI

## (undated) DEVICE — HEMOSTAT SURGICEL NU-KNIT 6X9

## (undated) DEVICE — KIT PROBE COVER WITH GEL

## (undated) DEVICE — BLADE ELECTRO EDGE INSULATED

## (undated) DEVICE — SPONGE GAUZE 16PLY 4X4

## (undated) DEVICE — GUIDEWIRE OMNI J TIP 185CM

## (undated) DEVICE — DRAPE SLUSH WARMER WITH DISC

## (undated) DEVICE — SYR 3CC LUER LOC

## (undated) DEVICE — DRESSING TELFA N ADH 3X8

## (undated) DEVICE — CATH ALII 4FR INFINITY

## (undated) DEVICE — WIRE INTRAMYOCARDIAL TEMP

## (undated) DEVICE — CATH MPA2 INFINITI 4FR 100CM

## (undated) DEVICE — MARKER SKIN STND TIP BLUE BARR

## (undated) DEVICE — FLOWSWITCH HP 1-W W/O LL

## (undated) DEVICE — GLOVE SURG BIOGEL LATEX SZ 7.5

## (undated) DEVICE — DRESSING TRANS 4X4 TEGADERM

## (undated) DEVICE — ELECTRODE REM PLYHSV RETURN 9

## (undated) DEVICE — TAPE SURG MEDIPORE 6X72IN

## (undated) DEVICE — DRAIN CHEST DRY SUCTION

## (undated) DEVICE — SYS VIRTUOSAPH PLUS EVM

## (undated) DEVICE — ADHESIVE DERMABOND ADVANCED

## (undated) DEVICE — BANDAGE ACE ELASTIC 6"

## (undated) DEVICE — SPIKE CONTRAST CONTROLLER

## (undated) DEVICE — Device

## (undated) DEVICE — BLOWER MISTER

## (undated) DEVICE — PAD K-THERMIA 24IN X 60IN

## (undated) DEVICE — BLADE SAW STERN .076MM 6.1MM L

## (undated) DEVICE — SUT 6 18IN STEEL MONO CCS

## (undated) DEVICE — BLADE 4IN EDGE INSULATED

## (undated) DEVICE — SUT PROLENE 6-0 BL C-1 C-1

## (undated) DEVICE — TRAY HEART

## (undated) DEVICE — SUT CTD VICRYL 0 UND CR/CTX

## (undated) DEVICE — PAD RADIOLUCENT STAT ADULT

## (undated) DEVICE — FOGERTY SOFT JAW DISP 2/PK

## (undated) DEVICE — INFLATOR ENCORE

## (undated) DEVICE — VISIPAQUE 320 200ML +PK

## (undated) DEVICE — PUNCH AORTIC 4.8MM

## (undated) DEVICE — GAUZE SPONGE 4X4 12PLY

## (undated) DEVICE — SPONGE LAP 18X18 PREWASHED

## (undated) DEVICE — SEE MEDLINE ITEM 157117

## (undated) DEVICE — SUT 6/0 4-24IN PROLENE

## (undated) DEVICE — CATH INFINITI JUDKINS JR4

## (undated) DEVICE — GUIDEWIRE EMERALD 150CM PTFE

## (undated) DEVICE — SYR 50CC LL

## (undated) DEVICE — SUT SILK BLK BR. 2 2-60

## (undated) DEVICE — DRESSING AQUACEL SACRAL 9 X 9

## (undated) DEVICE — KIT CUSTOM MANIFOLD

## (undated) DEVICE — SUT VICRYL 2-0 36 CT-1

## (undated) DEVICE — APPLIER CLIP LIAGCLIP 9.375IN

## (undated) DEVICE — GUIDEWIRE SUPRA CORE 035 190CM

## (undated) DEVICE — PROTECTION STATION PLUS

## (undated) DEVICE — STAPLER ECHELON FLEX GST 60MM

## (undated) DEVICE — DRESSING ADH ISLAND 3.6 X 14

## (undated) DEVICE — SUT 4-0 12-18IN SILK BLACK

## (undated) DEVICE — SEE MEDLINE ITEM 146292

## (undated) DEVICE — SHEATH INTRODUCER 6FR 11CM

## (undated) DEVICE — CATH AL1 4FR

## (undated) DEVICE — SUT PROLENE 3-0 SH DA 36 BL

## (undated) DEVICE — SET DECANTER MEDICHOICE

## (undated) DEVICE — DEVICE PERCLOSE SUT CLSR 6FR

## (undated) DEVICE — SUT PROLENE 4-0 RB-1 BL MO

## (undated) DEVICE — GUIDE LAUNCHER 6FR JR 4.0

## (undated) DEVICE — CATH ARI 4FR

## (undated) DEVICE — SEE MEDLINE ITEM 146417

## (undated) DEVICE — INSERTS STEALTH FIBRA SIZE 5

## (undated) DEVICE — SUT 7/0 24IN PROLENE BL MO

## (undated) DEVICE — SEE MEDLINE ITEM 152515

## (undated) DEVICE — CATH INFINITI 4F 3DRC 100CM

## (undated) DEVICE — SUT PROLENE 3-0 30 RB-1 BL

## (undated) DEVICE — SUT 2/0 30IN ETHIBOND

## (undated) DEVICE — SUT PROLENE 7-0 BV-1 30

## (undated) DEVICE — GAUZE SPONGE 4X4 8 PLY NS

## (undated) DEVICE — CATH JL5 4FR INFINITY

## (undated) DEVICE — DRESSING TELFA STRL 4X3 LF

## (undated) DEVICE — NDL 18GA X1 1/2 REG BEVEL

## (undated) DEVICE — KIT URINARY CATH URINE METER

## (undated) DEVICE — CLIP SPRING 6MM

## (undated) DEVICE — SEE MEDLINE ITEM 156894

## (undated) DEVICE — NDL BOX COUNTER

## (undated) DEVICE — RELOAD ECHELON FLEX GRN 60MM

## (undated) DEVICE — RETRACTOR OCTOBASE INSERT HOLD

## (undated) DEVICE — CATH AL III 4FR

## (undated) DEVICE — SUT PROLENE 4-0 SH BLU 36IN

## (undated) DEVICE — SUT SILK 2-0 SH 18IN BLACK

## (undated) DEVICE — APPLIER LIGACLIP SM 9.38IN

## (undated) DEVICE — CATH INFINITI 4F JL4 .042X100

## (undated) DEVICE — PROBE CRYO ABLATION 10CM

## (undated) DEVICE — CATH 5FR AL3.0

## (undated) DEVICE — DRAPE SPLIT STERILE

## (undated) DEVICE — CATH 5FR AL2.0

## (undated) DEVICE — TUBING HF INSUFFLATION W/ FLTR

## (undated) DEVICE — SUT PROLENE 5-0 24 C-1 BL

## (undated) DEVICE — KIT CO-PILOT

## (undated) DEVICE — CATH EAGLE EYE PLATINUM

## (undated) DEVICE — CATH GUID EBU4 LAUNCH 6FRX100

## (undated) DEVICE — SEE MEDLINE ITEM 152622

## (undated) DEVICE — TOURNIQUET TOURNIKWIK 2 TB 6IN

## (undated) DEVICE — CANNULA VESSEL FREE FLOW

## (undated) DEVICE — WIPE ESENTA BARR STNG FREE 3ML

## (undated) DEVICE — CANNULA IMA 1MM

## (undated) DEVICE — PLEDGET SUT SOFT 3/8X3/16X1/16

## (undated) DEVICE — KIT SAHARA DRAPE DRAW/LIFT

## (undated) DEVICE — DRAIN CHANNEL ROUND 19FR

## (undated) DEVICE — CONNECTOR Y 3/8X3/8X3/8